# Patient Record
Sex: FEMALE | Race: BLACK OR AFRICAN AMERICAN | URBAN - METROPOLITAN AREA
[De-identification: names, ages, dates, MRNs, and addresses within clinical notes are randomized per-mention and may not be internally consistent; named-entity substitution may affect disease eponyms.]

---

## 2017-01-01 ENCOUNTER — NURSING HOME VISIT (OUTPATIENT)
Dept: GERIATRICS | Facility: CLINIC | Age: 82
End: 2017-01-01
Payer: COMMERCIAL

## 2017-01-01 ENCOUNTER — APPOINTMENT (OUTPATIENT)
Dept: PHYSICAL THERAPY | Facility: CLINIC | Age: 82
DRG: 065 | End: 2017-01-01
Payer: MEDICARE

## 2017-01-01 ENCOUNTER — APPOINTMENT (OUTPATIENT)
Dept: OCCUPATIONAL THERAPY | Facility: CLINIC | Age: 82
DRG: 065 | End: 2017-01-01
Payer: MEDICARE

## 2017-01-01 ENCOUNTER — TRANSFERRED RECORDS (OUTPATIENT)
Dept: HEALTH INFORMATION MANAGEMENT | Facility: CLINIC | Age: 82
End: 2017-01-01

## 2017-01-01 ENCOUNTER — APPOINTMENT (OUTPATIENT)
Dept: GENERAL RADIOLOGY | Facility: CLINIC | Age: 82
DRG: 065 | End: 2017-01-01
Attending: INTERNAL MEDICINE
Payer: MEDICARE

## 2017-01-01 ENCOUNTER — TELEPHONE (OUTPATIENT)
Dept: GERIATRICS | Facility: CLINIC | Age: 82
End: 2017-01-01

## 2017-01-01 ENCOUNTER — APPOINTMENT (OUTPATIENT)
Dept: SPEECH THERAPY | Facility: CLINIC | Age: 82
DRG: 065 | End: 2017-01-01
Payer: MEDICARE

## 2017-01-01 ENCOUNTER — HOSPITAL ENCOUNTER (INPATIENT)
Facility: CLINIC | Age: 82
End: 2017-01-01
Attending: PHYSICAL MEDICINE & REHABILITATION | Admitting: PHYSICAL MEDICINE & REHABILITATION
Payer: MEDICARE

## 2017-01-01 ENCOUNTER — TELEPHONE (OUTPATIENT)
Dept: INTERNAL MEDICINE | Facility: CLINIC | Age: 82
End: 2017-01-01

## 2017-01-01 ENCOUNTER — APPOINTMENT (OUTPATIENT)
Dept: CT IMAGING | Facility: CLINIC | Age: 82
DRG: 065 | End: 2017-01-01
Attending: EMERGENCY MEDICINE
Payer: MEDICARE

## 2017-01-01 ENCOUNTER — APPOINTMENT (OUTPATIENT)
Dept: SPEECH THERAPY | Facility: CLINIC | Age: 82
DRG: 065 | End: 2017-01-01
Attending: INTERNAL MEDICINE
Payer: MEDICARE

## 2017-01-01 ENCOUNTER — DISCHARGE SUMMARY NURSING HOME (OUTPATIENT)
Dept: GERIATRICS | Facility: CLINIC | Age: 82
End: 2017-01-01
Payer: COMMERCIAL

## 2017-01-01 ENCOUNTER — APPOINTMENT (OUTPATIENT)
Dept: CT IMAGING | Facility: CLINIC | Age: 82
DRG: 065 | End: 2017-01-01
Attending: NURSE PRACTITIONER
Payer: MEDICARE

## 2017-01-01 ENCOUNTER — DOCUMENTATION ONLY (OUTPATIENT)
Dept: OTHER | Facility: CLINIC | Age: 82
End: 2017-01-01

## 2017-01-01 ENCOUNTER — APPOINTMENT (OUTPATIENT)
Dept: PHYSICAL THERAPY | Facility: CLINIC | Age: 82
DRG: 065 | End: 2017-01-01
Attending: INTERNAL MEDICINE
Payer: MEDICARE

## 2017-01-01 ENCOUNTER — APPOINTMENT (OUTPATIENT)
Dept: MRI IMAGING | Facility: CLINIC | Age: 82
DRG: 065 | End: 2017-01-01
Attending: NURSE PRACTITIONER
Payer: MEDICARE

## 2017-01-01 ENCOUNTER — HOSPITAL ENCOUNTER (INPATIENT)
Facility: CLINIC | Age: 82
LOS: 8 days | Discharge: NURSING, REHAB OR LONG-TERM CARE | DRG: 065 | End: 2017-01-23
Attending: EMERGENCY MEDICINE | Admitting: INTERNAL MEDICINE
Payer: MEDICARE

## 2017-01-01 VITALS
HEART RATE: 70 BPM | WEIGHT: 131.4 LBS | TEMPERATURE: 98.7 F | RESPIRATION RATE: 18 BRPM | SYSTOLIC BLOOD PRESSURE: 151 MMHG | HEIGHT: 65 IN | BODY MASS INDEX: 21.89 KG/M2 | DIASTOLIC BLOOD PRESSURE: 84 MMHG | OXYGEN SATURATION: 92 %

## 2017-01-01 VITALS
BODY MASS INDEX: 21.43 KG/M2 | HEART RATE: 78 BPM | TEMPERATURE: 64.4 F | HEIGHT: 65 IN | WEIGHT: 128.6 LBS | OXYGEN SATURATION: 97 % | DIASTOLIC BLOOD PRESSURE: 92 MMHG | SYSTOLIC BLOOD PRESSURE: 168 MMHG | RESPIRATION RATE: 18 BRPM

## 2017-01-01 VITALS
BODY MASS INDEX: 24.24 KG/M2 | SYSTOLIC BLOOD PRESSURE: 113 MMHG | DIASTOLIC BLOOD PRESSURE: 60 MMHG | WEIGHT: 145.5 LBS | RESPIRATION RATE: 16 BRPM | HEIGHT: 65 IN | HEART RATE: 69 BPM | OXYGEN SATURATION: 98 % | TEMPERATURE: 98.1 F

## 2017-01-01 VITALS
TEMPERATURE: 98.3 F | WEIGHT: 142.6 LBS | DIASTOLIC BLOOD PRESSURE: 72 MMHG | HEART RATE: 72 BPM | OXYGEN SATURATION: 95 % | HEIGHT: 65 IN | RESPIRATION RATE: 18 BRPM | SYSTOLIC BLOOD PRESSURE: 138 MMHG | BODY MASS INDEX: 23.76 KG/M2

## 2017-01-01 VITALS
WEIGHT: 142.6 LBS | DIASTOLIC BLOOD PRESSURE: 86 MMHG | SYSTOLIC BLOOD PRESSURE: 147 MMHG | RESPIRATION RATE: 16 BRPM | OXYGEN SATURATION: 99 % | BODY MASS INDEX: 23.73 KG/M2 | HEART RATE: 68 BPM | TEMPERATURE: 96.3 F

## 2017-01-01 VITALS
RESPIRATION RATE: 18 BRPM | TEMPERATURE: 97.9 F | HEIGHT: 65 IN | BODY MASS INDEX: 22.06 KG/M2 | DIASTOLIC BLOOD PRESSURE: 64 MMHG | SYSTOLIC BLOOD PRESSURE: 119 MMHG | WEIGHT: 132.4 LBS | HEART RATE: 77 BPM | OXYGEN SATURATION: 91 %

## 2017-01-01 VITALS
OXYGEN SATURATION: 94 % | DIASTOLIC BLOOD PRESSURE: 55 MMHG | BODY MASS INDEX: 22.01 KG/M2 | RESPIRATION RATE: 18 BRPM | TEMPERATURE: 97.2 F | SYSTOLIC BLOOD PRESSURE: 93 MMHG | WEIGHT: 132.1 LBS | HEIGHT: 65 IN | HEART RATE: 74 BPM

## 2017-01-01 VITALS
TEMPERATURE: 64.4 F | HEART RATE: 78 BPM | WEIGHT: 129.3 LBS | SYSTOLIC BLOOD PRESSURE: 160 MMHG | RESPIRATION RATE: 18 BRPM | HEIGHT: 65 IN | BODY MASS INDEX: 21.54 KG/M2 | DIASTOLIC BLOOD PRESSURE: 79 MMHG | OXYGEN SATURATION: 95 %

## 2017-01-01 VITALS
TEMPERATURE: 98.7 F | BODY MASS INDEX: 23.63 KG/M2 | SYSTOLIC BLOOD PRESSURE: 127 MMHG | HEART RATE: 88 BPM | OXYGEN SATURATION: 96 % | WEIGHT: 141.8 LBS | DIASTOLIC BLOOD PRESSURE: 66 MMHG | RESPIRATION RATE: 18 BRPM | HEIGHT: 65 IN

## 2017-01-01 DIAGNOSIS — N18.30 CKD (CHRONIC KIDNEY DISEASE) STAGE 3, GFR 30-59 ML/MIN (H): ICD-10-CM

## 2017-01-01 DIAGNOSIS — D64.9 ANEMIA, UNSPECIFIED TYPE: ICD-10-CM

## 2017-01-01 DIAGNOSIS — I10 BENIGN ESSENTIAL HYPERTENSION: ICD-10-CM

## 2017-01-01 DIAGNOSIS — D62 ANEMIA DUE TO BLOOD LOSS, ACUTE: ICD-10-CM

## 2017-01-01 DIAGNOSIS — I61.0 NONTRAUMATIC SUBCORTICAL HEMORRHAGE OF LEFT CEREBRAL HEMISPHERE (H): Primary | ICD-10-CM

## 2017-01-01 DIAGNOSIS — R45.1 RESTLESSNESS AND AGITATION: ICD-10-CM

## 2017-01-01 DIAGNOSIS — I95.9 HYPOTENSION, UNSPECIFIED HYPOTENSION TYPE: ICD-10-CM

## 2017-01-01 DIAGNOSIS — S72.142A INTERTROCHANTERIC FRACTURE OF LEFT HIP, CLOSED, INITIAL ENCOUNTER (H): ICD-10-CM

## 2017-01-01 DIAGNOSIS — K59.00 CONSTIPATION, UNSPECIFIED CONSTIPATION TYPE: ICD-10-CM

## 2017-01-01 DIAGNOSIS — D69.6 THROMBOCYTOPENIA (H): ICD-10-CM

## 2017-01-01 DIAGNOSIS — S72.142D INTERTROCHANTERIC FRACTURE OF LEFT HIP, CLOSED, WITH ROUTINE HEALING, SUBSEQUENT ENCOUNTER: ICD-10-CM

## 2017-01-01 DIAGNOSIS — R53.81 PHYSICAL DECONDITIONING: ICD-10-CM

## 2017-01-01 DIAGNOSIS — Z71.89 ACP (ADVANCE CARE PLANNING): Primary | Chronic | ICD-10-CM

## 2017-01-01 DIAGNOSIS — S72.142A INTERTROCHANTERIC FRACTURE OF LEFT HIP, CLOSED, INITIAL ENCOUNTER (H): Primary | ICD-10-CM

## 2017-01-01 DIAGNOSIS — R29.898 MUSCLE RIGIDITY: ICD-10-CM

## 2017-01-01 DIAGNOSIS — I10 ESSENTIAL HYPERTENSION, MALIGNANT: ICD-10-CM

## 2017-01-01 DIAGNOSIS — I10 BENIGN ESSENTIAL HYPERTENSION: Primary | ICD-10-CM

## 2017-01-01 DIAGNOSIS — Z71.89 ADVANCED DIRECTIVES, COUNSELING/DISCUSSION: ICD-10-CM

## 2017-01-01 DIAGNOSIS — I69.351 HEMIPARESIS AFFECTING RIGHT SIDE AS LATE EFFECT OF STROKE (H): Primary | ICD-10-CM

## 2017-01-01 DIAGNOSIS — I10 MALIGNANT HYPERTENSION: ICD-10-CM

## 2017-01-01 DIAGNOSIS — R63.4 LOSS OF WEIGHT: ICD-10-CM

## 2017-01-01 DIAGNOSIS — I61.9 THALAMIC HEMORRHAGE WITH STROKE (H): ICD-10-CM

## 2017-01-01 DIAGNOSIS — R09.02 HYPOXIA: ICD-10-CM

## 2017-01-01 DIAGNOSIS — D61.818 PANCYTOPENIA (H): ICD-10-CM

## 2017-01-01 DIAGNOSIS — I61.4 NONTRAUMATIC INTRACEREBRAL HEMORRHAGE OF CEREBELLUM, UNSPECIFIED LATERALITY (H): Primary | ICD-10-CM

## 2017-01-01 LAB
ALBUMIN UR-MCNC: 10 MG/DL
ALBUMIN UR-MCNC: 10 MG/DL
ANION GAP SERPL CALCULATED.3IONS-SCNC: 10 MMOL/L (ref 3–14)
ANION GAP SERPL CALCULATED.3IONS-SCNC: 11 MMOL/L (ref 3–14)
ANION GAP SERPL CALCULATED.3IONS-SCNC: 12 MMOL/L (ref 3–14)
ANION GAP SERPL CALCULATED.3IONS-SCNC: 9 MMOL/L (ref 3–14)
APPEARANCE UR: CLEAR
APPEARANCE UR: CLEAR
APTT PPP: 25 SEC (ref 22–37)
BACTERIA SPEC CULT: NO GROWTH
BACTERIA SPEC CULT: NO GROWTH
BASOPHILS # BLD AUTO: 0 10E9/L (ref 0–0.2)
BASOPHILS NFR BLD AUTO: 0.5 %
BILIRUB UR QL STRIP: NEGATIVE
BILIRUB UR QL STRIP: NEGATIVE
BUN SERPL-MCNC: 18 MG/DL (ref 7–30)
BUN SERPL-MCNC: 19 MG/DL (ref 7–30)
BUN SERPL-MCNC: 20 MG/DL (ref 7–30)
BUN SERPL-MCNC: 24 MG/DL (ref 7–30)
BUN SERPL-MCNC: 26 MG/DL (ref 7–30)
BUN SERPL-MCNC: 26 MG/DL (ref 9–26)
BUN SERPL-MCNC: 27 MG/DL (ref 9–26)
BUN SERPL-MCNC: 27 MG/DL (ref 9–26)
BUN SERPL-MCNC: 28 MG/DL (ref 7–30)
BUN SERPL-MCNC: 28 MG/DL (ref 9–26)
BUN SERPL-MCNC: 31 MG/DL (ref 7–30)
BUN SERPL-MCNC: 35 MG/DL (ref 7–30)
BUN SERPL-MCNC: 39 MG/DL (ref 9–26)
BUN SERPL-MCNC: 41 MG/DL (ref 7–30)
BUN SERPL-MCNC: 48 MG/DL (ref 9–26)
CALCIUM SERPL-MCNC: 7.8 MG/DL (ref 8.5–10.1)
CALCIUM SERPL-MCNC: 8 MG/DL (ref 8.5–10.1)
CALCIUM SERPL-MCNC: 8.2 MG/DL (ref 8.5–10.1)
CALCIUM SERPL-MCNC: 8.5 MG/DL (ref 8.4–10.2)
CALCIUM SERPL-MCNC: 8.5 MG/DL (ref 8.5–10.1)
CALCIUM SERPL-MCNC: 8.7 MG/DL (ref 8.5–10.1)
CALCIUM SERPL-MCNC: 8.8 MG/DL (ref 8.4–10.2)
CALCIUM SERPL-MCNC: 8.8 MG/DL (ref 8.5–10.1)
CALCIUM SERPL-MCNC: 8.9 MG/DL (ref 8.4–10.2)
CALCIUM SERPL-MCNC: 9 MG/DL (ref 8.5–10.1)
CALCIUM SERPL-MCNC: 9.4 MG/DL (ref 8.4–10.2)
CALCIUM SERPL-MCNC: 9.8 MG/DL (ref 8.4–10.2)
CALCIUM SERPL-MCNC: ABNORMAL MG/DL (ref 0.55–1.02)
CHLORIDE SERPL-SCNC: 105 MMOL/L (ref 94–109)
CHLORIDE SERPL-SCNC: 111 MMOL/L (ref 94–109)
CHLORIDE SERPL-SCNC: 113 MMOL/L (ref 94–109)
CHLORIDE SERPL-SCNC: 113 MMOL/L (ref 94–109)
CHLORIDE SERPL-SCNC: 115 MMOL/L (ref 94–109)
CHLORIDE SERPL-SCNC: 116 MMOL/L (ref 94–109)
CHLORIDE SERPL-SCNC: 117 MMOL/L (ref 94–109)
CHLORIDE SERPLBLD-SCNC: 109 MMOL/L (ref 98–109)
CHLORIDE SERPLBLD-SCNC: 110 MMOL/L (ref 98–109)
CHLORIDE SERPLBLD-SCNC: 110 MMOL/L (ref 98–109)
CHLORIDE SERPLBLD-SCNC: 111 MMOL/L (ref 98–109)
CHLORIDE SERPLBLD-SCNC: 114 MMOL/L (ref 98–109)
CHLORIDE SERPLBLD-SCNC: 119 MMOL/L (ref 98–109)
CO2 SERPL-SCNC: 20 MMOL/L (ref 20–32)
CO2 SERPL-SCNC: 21 MMOL/L (ref 20–32)
CO2 SERPL-SCNC: 22 MMOL/L (ref 20–32)
CO2 SERPL-SCNC: 22 MMOL/L (ref 22–31)
CO2 SERPL-SCNC: 23 MMOL/L (ref 20–32)
CO2 SERPL-SCNC: 24 MMOL/L (ref 20–32)
CO2 SERPL-SCNC: 24 MMOL/L (ref 22–31)
CO2 SERPL-SCNC: 25 MMOL/L (ref 22–31)
CO2 SERPL-SCNC: 27 MMOL/L (ref 22–31)
COLOR UR AUTO: ABNORMAL
COLOR UR AUTO: YELLOW
CREAT SERPL-MCNC: 0.81 MG/DL (ref 0.52–1.04)
CREAT SERPL-MCNC: 0.85 MG/DL (ref 0.52–1.04)
CREAT SERPL-MCNC: 0.96 MG/DL (ref 0.52–1.04)
CREAT SERPL-MCNC: 0.97 MG/DL (ref 0.52–1.04)
CREAT SERPL-MCNC: 0.99 MG/DL (ref 0.52–1.04)
CREAT SERPL-MCNC: 1.02 MG/DL (ref 0.52–1.04)
CREAT SERPL-MCNC: 1.03 MG/DL (ref 0.55–1.02)
CREAT SERPL-MCNC: 1.04 MG/DL (ref 0.52–1.04)
CREAT SERPL-MCNC: 1.06 MG/DL (ref 0.52–1.04)
CREAT SERPL-MCNC: 1.1 MG/DL (ref 0.55–1.02)
CREAT SERPL-MCNC: 1.12 MG/DL (ref 0.52–1.04)
CREAT SERPL-MCNC: 1.16 MG/DL (ref 0.55–1.02)
CREAT SERPL-MCNC: 1.22 MG/DL (ref 0–?)
CREAT SERPL-MCNC: 1.24 MG/DL (ref 0.55–1.02)
CREAT SERPL-MCNC: 1.37 MG/DL (ref 0.55–1.02)
DIFFERENTIAL METHOD BLD: ABNORMAL
EOSINOPHIL # BLD AUTO: 0.1 10E9/L (ref 0–0.7)
EOSINOPHIL NFR BLD AUTO: 1.7 %
ERYTHROCYTE [DISTWIDTH] IN BLOOD BY AUTOMATED COUNT: 16 % (ref 10–15)
ERYTHROCYTE [DISTWIDTH] IN BLOOD BY AUTOMATED COUNT: 16.1 % (ref 10–15)
ERYTHROCYTE [DISTWIDTH] IN BLOOD BY AUTOMATED COUNT: 16.3 % (ref 10–15)
ERYTHROCYTE [DISTWIDTH] IN BLOOD BY AUTOMATED COUNT: 16.4 % (ref 11–15)
ERYTHROCYTE [DISTWIDTH] IN BLOOD BY AUTOMATED COUNT: 16.5 % (ref 10–15)
ERYTHROCYTE [DISTWIDTH] IN BLOOD BY AUTOMATED COUNT: 16.6 % (ref 10–15)
ERYTHROCYTE [DISTWIDTH] IN BLOOD BY AUTOMATED COUNT: 16.7 % (ref 10–15)
ERYTHROCYTE [DISTWIDTH] IN BLOOD BY AUTOMATED COUNT: 16.9 % (ref 10–15)
ERYTHROCYTE [DISTWIDTH] IN BLOOD BY AUTOMATED COUNT: 17.7 % (ref 11–15)
GFR SERPL CREATININE-BSD FRML MDRD: 35 ML/MIN/1.73M2
GFR SERPL CREATININE-BSD FRML MDRD: 39 ML/MIN/1.73M2
GFR SERPL CREATININE-BSD FRML MDRD: 40 ML/MIN/1.73M2
GFR SERPL CREATININE-BSD FRML MDRD: 43 ML/MIN/1.73M2
GFR SERPL CREATININE-BSD FRML MDRD: 45 ML/MIN/1.73M2
GFR SERPL CREATININE-BSD FRML MDRD: 46 ML/MIN/1.7M2
GFR SERPL CREATININE-BSD FRML MDRD: 49 ML/MIN/1.73M2
GFR SERPL CREATININE-BSD FRML MDRD: 49 ML/MIN/1.7M2
GFR SERPL CREATININE-BSD FRML MDRD: 50 ML/MIN/1.7M2
GFR SERPL CREATININE-BSD FRML MDRD: 51 ML/MIN/1.7M2
GFR SERPL CREATININE-BSD FRML MDRD: 53 ML/MIN/1.7M2
GFR SERPL CREATININE-BSD FRML MDRD: 55 ML/MIN/1.7M2
GFR SERPL CREATININE-BSD FRML MDRD: 55 ML/MIN/1.7M2
GFR SERPL CREATININE-BSD FRML MDRD: 63 ML/MIN/1.7M2
GFR SERPL CREATININE-BSD FRML MDRD: 67 ML/MIN/1.7M2
GLUCOSE BLDC GLUCOMTR-MCNC: 105 MG/DL (ref 70–99)
GLUCOSE BLDC GLUCOMTR-MCNC: 107 MG/DL (ref 70–99)
GLUCOSE BLDC GLUCOMTR-MCNC: 92 MG/DL (ref 70–99)
GLUCOSE BLDC GLUCOMTR-MCNC: 95 MG/DL (ref 70–99)
GLUCOSE BLDC GLUCOMTR-MCNC: 98 MG/DL (ref 70–99)
GLUCOSE SERPL-MCNC: 104 MG/DL (ref 70–100)
GLUCOSE SERPL-MCNC: 104 MG/DL (ref 70–99)
GLUCOSE SERPL-MCNC: 104 MG/DL (ref 70–99)
GLUCOSE SERPL-MCNC: 111 MG/DL (ref 70–99)
GLUCOSE SERPL-MCNC: 112 MG/DL (ref 70–99)
GLUCOSE SERPL-MCNC: 114 MG/DL (ref 70–100)
GLUCOSE SERPL-MCNC: 120 MG/DL (ref 70–99)
GLUCOSE SERPL-MCNC: 126 MG/DL (ref 70–99)
GLUCOSE SERPL-MCNC: 135 MG/DL (ref 70–100)
GLUCOSE SERPL-MCNC: 142 MG/DL (ref 70–99)
GLUCOSE SERPL-MCNC: 163 MG/DL (ref 70–99)
GLUCOSE SERPL-MCNC: 82 MG/DL (ref 70–100)
GLUCOSE SERPL-MCNC: 85 MG/DL (ref 70–99)
GLUCOSE SERPL-MCNC: 87 MG/DL (ref 70–100)
GLUCOSE SERPL-MCNC: 97 MG/DL (ref 70–100)
GLUCOSE UR STRIP-MCNC: NEGATIVE MG/DL
GLUCOSE UR STRIP-MCNC: NEGATIVE MG/DL
HCT VFR BLD AUTO: 28.1 % (ref 35–47)
HCT VFR BLD AUTO: 28.1 % (ref 35–47)
HCT VFR BLD AUTO: 28.8 % (ref 35–47)
HCT VFR BLD AUTO: 28.9 % (ref 35–47)
HCT VFR BLD AUTO: 29.5 % (ref 35–47)
HCT VFR BLD AUTO: 30.3 % (ref 35–47)
HCT VFR BLD AUTO: 31.2 % (ref 35–46)
HCT VFR BLD AUTO: 31.2 % (ref 35–47)
HCT VFR BLD AUTO: 31.9 % (ref 35–47)
HCT VFR BLD AUTO: 32.2 % (ref 35–46)
HCT VFR BLD AUTO: 32.6 % (ref 35–47)
HEMOGLOBIN: 8.9 G/DL (ref 11.8–15.5)
HEMOGLOBIN: 9.4 G/DL (ref 11.8–15.5)
HEMOGLOBIN: 9.5 G/DL (ref 11.8–15.5)
HEMOGLOBIN: 9.6 G/DL (ref 11.8–15.5)
HGB BLD-MCNC: 10.2 G/DL (ref 11.7–15.7)
HGB BLD-MCNC: 10.4 G/DL (ref 11.7–15.7)
HGB BLD-MCNC: 9.2 G/DL (ref 11.7–15.7)
HGB BLD-MCNC: 9.2 G/DL (ref 11.7–15.7)
HGB BLD-MCNC: 9.3 G/DL (ref 11.7–15.7)
HGB BLD-MCNC: 9.4 G/DL (ref 11.7–15.7)
HGB BLD-MCNC: 9.6 G/DL (ref 11.7–15.7)
HGB BLD-MCNC: 9.8 G/DL (ref 11.7–15.7)
HGB BLD-MCNC: 9.9 G/DL (ref 11.7–15.7)
HGB UR QL STRIP: ABNORMAL
HGB UR QL STRIP: NEGATIVE
IMM GRANULOCYTES # BLD: 0 10E9/L (ref 0–0.4)
IMM GRANULOCYTES NFR BLD: 0.7 %
INR PPP: 1.06 (ref 0.86–1.14)
INTERPRETATION ECG - MUSE: NORMAL
KETONES UR STRIP-MCNC: 5 MG/DL
KETONES UR STRIP-MCNC: NEGATIVE MG/DL
LEUKOCYTE ESTERASE UR QL STRIP: ABNORMAL
LEUKOCYTE ESTERASE UR QL STRIP: NEGATIVE
LYMPHOCYTES # BLD AUTO: 1.2 10E9/L (ref 0.8–5.3)
LYMPHOCYTES NFR BLD AUTO: 28.8 %
Lab: NORMAL
MCH RBC QN AUTO: 30.8 PG (ref 26.5–33)
MCH RBC QN AUTO: 30.9 PG (ref 26.5–33)
MCH RBC QN AUTO: 31.1 PG (ref 26.5–33)
MCH RBC QN AUTO: 31.3 PG (ref 26.5–33)
MCH RBC QN AUTO: 31.5 PG (ref 26.5–33)
MCH RBC QN AUTO: 31.6 PG (ref 26.5–33)
MCH RBC QN AUTO: 31.6 PG (ref 26.5–33)
MCH RBC QN AUTO: 31.7 PG (ref 26.5–33)
MCH RBC QN AUTO: 31.7 PG (ref 26.5–33)
MCHC RBC AUTO-ENTMCNC: 31.7 G/DL (ref 31.5–36.5)
MCHC RBC AUTO-ENTMCNC: 31.9 G/DL (ref 31.5–36.5)
MCHC RBC AUTO-ENTMCNC: 32 G/DL (ref 31.5–36.5)
MCHC RBC AUTO-ENTMCNC: 32.3 G/DL (ref 31.5–36.5)
MCHC RBC AUTO-ENTMCNC: 32.3 G/DL (ref 31.5–36.5)
MCHC RBC AUTO-ENTMCNC: 32.5 G/DL (ref 31.5–36.5)
MCHC RBC AUTO-ENTMCNC: 32.5 G/DL (ref 31.5–36.5)
MCHC RBC AUTO-ENTMCNC: 32.7 G/DL (ref 31.5–36.5)
MCHC RBC AUTO-ENTMCNC: 32.7 G/DL (ref 31.5–36.5)
MCV RBC AUTO: 100.6 FL (ref 80–100)
MCV RBC AUTO: 103.2 FL (ref 80–100)
MCV RBC AUTO: 97 FL (ref 78–100)
MCV RBC AUTO: 98 FL (ref 78–100)
MICRO REPORT STATUS: NORMAL
MICRO REPORT STATUS: NORMAL
MONOCYTES # BLD AUTO: 0.8 10E9/L (ref 0–1.3)
MONOCYTES NFR BLD AUTO: 19 %
NEUTROPHILS # BLD AUTO: 2.1 10E9/L (ref 1.6–8.3)
NEUTROPHILS NFR BLD AUTO: 49.3 %
NITRATE UR QL: NEGATIVE
NITRATE UR QL: NEGATIVE
NRBC # BLD AUTO: 0 10*3/UL
NRBC BLD AUTO-RTO: 0 /100
PH UR STRIP: 5.5 PH (ref 5–7)
PH UR STRIP: 6.5 PH (ref 5–7)
PLATELET # BLD AUTO: 125 10E9/L (ref 150–450)
PLATELET # BLD AUTO: 135 K/CMM (ref 140–450)
PLATELET # BLD AUTO: 140 10E9/L (ref 150–450)
PLATELET # BLD AUTO: 143 10E9/L (ref 150–450)
PLATELET # BLD AUTO: 176 10E9/L (ref 150–450)
PLATELET # BLD AUTO: 192 K/CMM (ref 140–450)
PLATELET # BLD AUTO: 204 10E9/L (ref 150–450)
PLATELET # BLD AUTO: 207 10E9/L (ref 150–450)
PLATELET # BLD AUTO: 209 10E9/L (ref 150–450)
PLATELET # BLD AUTO: 225 10E9/L (ref 150–450)
PLATELET # BLD AUTO: 230 10E9/L (ref 150–450)
POTASSIUM SERPL-SCNC: 3.4 MMOL/L (ref 3.4–5.3)
POTASSIUM SERPL-SCNC: 3.4 MMOL/L (ref 3.4–5.3)
POTASSIUM SERPL-SCNC: 3.7 MMOL/L (ref 3.5–5.2)
POTASSIUM SERPL-SCNC: 3.8 MMOL/L (ref 3.4–5.3)
POTASSIUM SERPL-SCNC: 3.9 MMOL/L (ref 3.4–5.3)
POTASSIUM SERPL-SCNC: 3.9 MMOL/L (ref 3.5–5.2)
POTASSIUM SERPL-SCNC: 4 MMOL/L (ref 3.4–5.3)
POTASSIUM SERPL-SCNC: 4 MMOL/L (ref 3.4–5.3)
POTASSIUM SERPL-SCNC: 4 MMOL/L (ref 3.5–5.2)
POTASSIUM SERPL-SCNC: 4.1 MMOL/L (ref 3.4–5.3)
POTASSIUM SERPL-SCNC: 4.2 MMOL/L (ref 3.5–5.2)
POTASSIUM SERPL-SCNC: 4.5 MMOL/L (ref 3.5–5.2)
POTASSIUM SERPL-SCNC: 5 MMOL/L (ref 3.5–5.2)
RADIOLOGIST FLAGS: ABNORMAL
RBC # BLD AUTO: 2.9 10E12/L (ref 3.8–5.2)
RBC # BLD AUTO: 2.9 10E12/L (ref 3.8–5.2)
RBC # BLD AUTO: 2.97 10E12/L (ref 3.8–5.2)
RBC # BLD AUTO: 2.98 10E12/L (ref 3.8–5.2)
RBC # BLD AUTO: 3.04 10E12/L (ref 3.8–5.2)
RBC # BLD AUTO: 3.1 10E12/L (ref 3.8–5.2)
RBC # BLD AUTO: 3.1 M/CMM (ref 3.7–5.2)
RBC # BLD AUTO: 3.12 M/CMM (ref 3.7–5.2)
RBC # BLD AUTO: 3.21 10E12/L (ref 3.8–5.2)
RBC # BLD AUTO: 3.28 10E12/L (ref 3.8–5.2)
RBC # BLD AUTO: 3.37 10E12/L (ref 3.8–5.2)
RBC #/AREA URNS AUTO: 11 /HPF (ref 0–2)
RBC #/AREA URNS AUTO: <1 /HPF (ref 0–2)
SODIUM SERPL-SCNC: 138 MMOL/L (ref 133–144)
SODIUM SERPL-SCNC: 140 MMOL/L (ref 136–145)
SODIUM SERPL-SCNC: 143 MMOL/L (ref 133–144)
SODIUM SERPL-SCNC: 143 MMOL/L (ref 136–145)
SODIUM SERPL-SCNC: 143 MMOL/L (ref 136–145)
SODIUM SERPL-SCNC: 144 MMOL/L (ref 133–144)
SODIUM SERPL-SCNC: 145 MMOL/L (ref 133–144)
SODIUM SERPL-SCNC: 146 MMOL/L (ref 133–144)
SODIUM SERPL-SCNC: 147 MMOL/L (ref 133–144)
SODIUM SERPL-SCNC: 147 MMOL/L (ref 136–145)
SODIUM SERPL-SCNC: 148 MMOL/L (ref 133–144)
SODIUM SERPL-SCNC: 148 MMOL/L (ref 136–145)
SODIUM SERPL-SCNC: 148 MMOL/L (ref 136–145)
SP GR UR STRIP: 1.02 (ref 1–1.03)
SP GR UR STRIP: 1.02 (ref 1–1.03)
SPECIMEN SOURCE: NORMAL
SPECIMEN SOURCE: NORMAL
SQUAMOUS #/AREA URNS AUTO: <1 /HPF (ref 0–1)
TROPONIN I SERPL-MCNC: NORMAL UG/L (ref 0–0.04)
URN SPEC COLLECT METH UR: ABNORMAL
URN SPEC COLLECT METH UR: ABNORMAL
UROBILINOGEN UR STRIP-MCNC: NORMAL MG/DL (ref 0–2)
UROBILINOGEN UR STRIP-MCNC: NORMAL MG/DL (ref 0–2)
WBC # BLD AUTO: 2.8 K/CMM (ref 3.8–11)
WBC # BLD AUTO: 3.2 K/CMM (ref 3.8–11)
WBC # BLD AUTO: 4.2 10E9/L (ref 4–11)
WBC # BLD AUTO: 4.4 10E9/L (ref 4–11)
WBC # BLD AUTO: 4.6 10E9/L (ref 4–11)
WBC # BLD AUTO: 5 10E9/L (ref 4–11)
WBC # BLD AUTO: 5.4 10E9/L (ref 4–11)
WBC # BLD AUTO: 6 10E9/L (ref 4–11)
WBC # BLD AUTO: 6.8 10E9/L (ref 4–11)
WBC # BLD AUTO: 6.9 10E9/L (ref 4–11)
WBC # BLD AUTO: 8 10E9/L (ref 4–11)
WBC #/AREA URNS AUTO: 4 /HPF (ref 0–2)
WBC #/AREA URNS AUTO: <1 /HPF (ref 0–2)

## 2017-01-01 PROCEDURE — 25000125 ZZHC RX 250: Performed by: INTERNAL MEDICINE

## 2017-01-01 PROCEDURE — 25000125 ZZHC RX 250: Performed by: PSYCHIATRY & NEUROLOGY

## 2017-01-01 PROCEDURE — 36415 COLL VENOUS BLD VENIPUNCTURE: CPT | Performed by: INTERNAL MEDICINE

## 2017-01-01 PROCEDURE — 40000225 ZZH STATISTIC SLP WARD VISIT: Performed by: SPEECH-LANGUAGE PATHOLOGIST

## 2017-01-01 PROCEDURE — 99232 SBSQ HOSP IP/OBS MODERATE 35: CPT | Performed by: INTERNAL MEDICINE

## 2017-01-01 PROCEDURE — 99309 SBSQ NF CARE MODERATE MDM 30: CPT | Performed by: NURSE PRACTITIONER

## 2017-01-01 PROCEDURE — 84484 ASSAY OF TROPONIN QUANT: CPT | Performed by: EMERGENCY MEDICINE

## 2017-01-01 PROCEDURE — 97112 NEUROMUSCULAR REEDUCATION: CPT | Mod: GO | Performed by: OCCUPATIONAL THERAPIST

## 2017-01-01 PROCEDURE — 97110 THERAPEUTIC EXERCISES: CPT | Mod: GP

## 2017-01-01 PROCEDURE — 25000128 H RX IP 250 OP 636: Performed by: INTERNAL MEDICINE

## 2017-01-01 PROCEDURE — 25000125 ZZHC RX 250: Performed by: EMERGENCY MEDICINE

## 2017-01-01 PROCEDURE — 92526 ORAL FUNCTION THERAPY: CPT | Mod: GN

## 2017-01-01 PROCEDURE — 25000128 H RX IP 250 OP 636: Performed by: EMERGENCY MEDICINE

## 2017-01-01 PROCEDURE — 40000193 ZZH STATISTIC PT WARD VISIT: Performed by: PHYSICAL THERAPIST

## 2017-01-01 PROCEDURE — 97535 SELF CARE MNGMENT TRAINING: CPT | Mod: GO

## 2017-01-01 PROCEDURE — 85027 COMPLETE CBC AUTOMATED: CPT | Performed by: INTERNAL MEDICINE

## 2017-01-01 PROCEDURE — 80048 BASIC METABOLIC PNL TOTAL CA: CPT | Performed by: INTERNAL MEDICINE

## 2017-01-01 PROCEDURE — 25000132 ZZH RX MED GY IP 250 OP 250 PS 637: Mod: GY | Performed by: INTERNAL MEDICINE

## 2017-01-01 PROCEDURE — 40000133 ZZH STATISTIC OT WARD VISIT

## 2017-01-01 PROCEDURE — 12000000 ZZH R&B MED SURG/OB

## 2017-01-01 PROCEDURE — 99223 1ST HOSP IP/OBS HIGH 75: CPT | Mod: AI | Performed by: INTERNAL MEDICINE

## 2017-01-01 PROCEDURE — 99310 SBSQ NF CARE HIGH MDM 45: CPT | Performed by: NURSE PRACTITIONER

## 2017-01-01 PROCEDURE — 81001 URINALYSIS AUTO W/SCOPE: CPT | Performed by: EMERGENCY MEDICINE

## 2017-01-01 PROCEDURE — A9270 NON-COVERED ITEM OR SERVICE: HCPCS | Mod: GY | Performed by: INTERNAL MEDICINE

## 2017-01-01 PROCEDURE — 97535 SELF CARE MNGMENT TRAINING: CPT | Mod: GO | Performed by: OCCUPATIONAL THERAPIST

## 2017-01-01 PROCEDURE — 92526 ORAL FUNCTION THERAPY: CPT | Mod: GN | Performed by: SPEECH-LANGUAGE PATHOLOGIST

## 2017-01-01 PROCEDURE — 00000146 ZZHCL STATISTIC GLUCOSE BY METER IP

## 2017-01-01 PROCEDURE — 99207 ZZC NO BILLABLE SERVICE THIS VISIT: CPT | Performed by: NURSE PRACTITIONER

## 2017-01-01 PROCEDURE — 99207 ZZC CDG-CORRECTLY CODED, REVIEWED AND AGREE: CPT | Performed by: INTERNAL MEDICINE

## 2017-01-01 PROCEDURE — 92610 EVALUATE SWALLOWING FUNCTION: CPT | Mod: GN | Performed by: SPEECH-LANGUAGE PATHOLOGIST

## 2017-01-01 PROCEDURE — 92611 MOTION FLUOROSCOPY/SWALLOW: CPT | Mod: GN | Performed by: SPEECH-LANGUAGE PATHOLOGIST

## 2017-01-01 PROCEDURE — 70450 CT HEAD/BRAIN W/O DYE: CPT | Mod: XS

## 2017-01-01 PROCEDURE — 40000225 ZZH STATISTIC SLP WARD VISIT

## 2017-01-01 PROCEDURE — 97530 THERAPEUTIC ACTIVITIES: CPT | Mod: GP | Performed by: PHYSICAL THERAPIST

## 2017-01-01 PROCEDURE — 99207 ZZC CDG-CORRECTLY CODED, REVIEWED AND AGREE: CPT | Performed by: NURSE PRACTITIONER

## 2017-01-01 PROCEDURE — 25500064 ZZH RX 255 OP 636: Performed by: EMERGENCY MEDICINE

## 2017-01-01 PROCEDURE — 40000133 ZZH STATISTIC OT WARD VISIT: Performed by: OCCUPATIONAL THERAPIST

## 2017-01-01 PROCEDURE — 87040 BLOOD CULTURE FOR BACTERIA: CPT | Performed by: INTERNAL MEDICINE

## 2017-01-01 PROCEDURE — 99310 SBSQ NF CARE HIGH MDM 45: CPT | Performed by: INTERNAL MEDICINE

## 2017-01-01 PROCEDURE — 20000003 ZZH R&B ICU

## 2017-01-01 PROCEDURE — 40000193 ZZH STATISTIC PT WARD VISIT

## 2017-01-01 PROCEDURE — 99291 CRITICAL CARE FIRST HOUR: CPT | Mod: 25

## 2017-01-01 PROCEDURE — 74230 X-RAY XM SWLNG FUNCJ C+: CPT

## 2017-01-01 PROCEDURE — 96365 THER/PROPH/DIAG IV INF INIT: CPT | Mod: 59

## 2017-01-01 PROCEDURE — 36415 COLL VENOUS BLD VENIPUNCTURE: CPT | Performed by: EMERGENCY MEDICINE

## 2017-01-01 PROCEDURE — 99316 NF DSCHRG MGMT 30 MIN+: CPT | Performed by: NURSE PRACTITIONER

## 2017-01-01 PROCEDURE — 70450 CT HEAD/BRAIN W/O DYE: CPT

## 2017-01-01 PROCEDURE — 71010 XR CHEST PORT 1 VW: CPT

## 2017-01-01 PROCEDURE — 97112 NEUROMUSCULAR REEDUCATION: CPT | Mod: GO

## 2017-01-01 PROCEDURE — 97112 NEUROMUSCULAR REEDUCATION: CPT | Mod: GP | Performed by: PHYSICAL THERAPIST

## 2017-01-01 PROCEDURE — 80048 BASIC METABOLIC PNL TOTAL CA: CPT | Performed by: EMERGENCY MEDICINE

## 2017-01-01 PROCEDURE — 85610 PROTHROMBIN TIME: CPT | Performed by: EMERGENCY MEDICINE

## 2017-01-01 PROCEDURE — 99292 CRITICAL CARE ADDL 30 MIN: CPT

## 2017-01-01 PROCEDURE — 81001 URINALYSIS AUTO W/SCOPE: CPT | Performed by: INTERNAL MEDICINE

## 2017-01-01 PROCEDURE — 70551 MRI BRAIN STEM W/O DYE: CPT

## 2017-01-01 PROCEDURE — 97162 PT EVAL MOD COMPLEX 30 MIN: CPT | Mod: GP | Performed by: PHYSICAL THERAPIST

## 2017-01-01 PROCEDURE — 40000141 ZZH STATISTIC PERIPHERAL IV START W/O US GUIDANCE

## 2017-01-01 PROCEDURE — 97166 OT EVAL MOD COMPLEX 45 MIN: CPT | Mod: GO | Performed by: OCCUPATIONAL THERAPIST

## 2017-01-01 PROCEDURE — 85025 COMPLETE CBC W/AUTO DIFF WBC: CPT | Performed by: EMERGENCY MEDICINE

## 2017-01-01 PROCEDURE — 70498 CT ANGIOGRAPHY NECK: CPT

## 2017-01-01 PROCEDURE — 99239 HOSP IP/OBS DSCHRG MGMT >30: CPT | Performed by: INTERNAL MEDICINE

## 2017-01-01 PROCEDURE — 85730 THROMBOPLASTIN TIME PARTIAL: CPT | Performed by: EMERGENCY MEDICINE

## 2017-01-01 PROCEDURE — 93005 ELECTROCARDIOGRAM TRACING: CPT

## 2017-01-01 RX ORDER — LABETALOL HYDROCHLORIDE 5 MG/ML
10-20 INJECTION, SOLUTION INTRAVENOUS
Status: DISCONTINUED | OUTPATIENT
Start: 2017-01-01 | End: 2017-01-01

## 2017-01-01 RX ORDER — HYDRALAZINE HYDROCHLORIDE 25 MG/1
25 TABLET, FILM COATED ORAL 2 TIMES DAILY
COMMUNITY
End: 2017-01-01

## 2017-01-01 RX ORDER — ACETAMINOPHEN 325 MG/1
650 TABLET ORAL EVERY 6 HOURS PRN
Status: DISCONTINUED | OUTPATIENT
Start: 2017-01-01 | End: 2017-01-01 | Stop reason: HOSPADM

## 2017-01-01 RX ORDER — LISINOPRIL 20 MG/1
20 TABLET ORAL DAILY
Status: DISCONTINUED | OUTPATIENT
Start: 2017-01-01 | End: 2017-01-01

## 2017-01-01 RX ORDER — IOPAMIDOL 755 MG/ML
70 INJECTION, SOLUTION INTRAVASCULAR ONCE
Status: COMPLETED | OUTPATIENT
Start: 2017-01-01 | End: 2017-01-01

## 2017-01-01 RX ORDER — DILTIAZEM HYDROCHLORIDE 30 MG/1
30 TABLET, FILM COATED ORAL EVERY 8 HOURS SCHEDULED
Status: DISCONTINUED | OUTPATIENT
Start: 2017-01-01 | End: 2017-01-01

## 2017-01-01 RX ORDER — HYDRALAZINE HYDROCHLORIDE 20 MG/ML
10 INJECTION INTRAMUSCULAR; INTRAVENOUS EVERY 4 HOURS PRN
Status: DISCONTINUED | OUTPATIENT
Start: 2017-01-01 | End: 2017-01-01

## 2017-01-01 RX ORDER — HYDRALAZINE HYDROCHLORIDE 20 MG/ML
20 INJECTION INTRAMUSCULAR; INTRAVENOUS EVERY 4 HOURS PRN
Status: DISCONTINUED | OUTPATIENT
Start: 2017-01-01 | End: 2017-01-01 | Stop reason: HOSPADM

## 2017-01-01 RX ORDER — ACETAMINOPHEN 500 MG
1000 TABLET ORAL 3 TIMES DAILY
COMMUNITY

## 2017-01-01 RX ORDER — CLONIDINE HYDROCHLORIDE 0.1 MG/1
0.1 TABLET ORAL 3 TIMES DAILY
Status: DISCONTINUED | OUTPATIENT
Start: 2017-01-01 | End: 2017-01-01

## 2017-01-01 RX ORDER — CLONIDINE HYDROCHLORIDE 0.1 MG/1
0.1 TABLET ORAL 4 TIMES DAILY PRN
Status: DISCONTINUED | OUTPATIENT
Start: 2017-01-01 | End: 2017-01-01

## 2017-01-01 RX ORDER — LISINOPRIL 40 MG/1
40 TABLET ORAL DAILY
Qty: 30 TABLET | DISCHARGE
Start: 2017-01-01 | End: 2017-01-01

## 2017-01-01 RX ORDER — LISINOPRIL 40 MG/1
40 TABLET ORAL DAILY
Qty: 30 TABLET | DISCHARGE
Start: 2017-01-01 | End: 2017-01-01 | Stop reason: DRUGHIGH

## 2017-01-01 RX ORDER — LISINOPRIL 40 MG/1
40 TABLET ORAL DAILY
Status: DISCONTINUED | OUTPATIENT
Start: 2017-01-01 | End: 2017-01-01 | Stop reason: HOSPADM

## 2017-01-01 RX ORDER — LISINOPRIL 10 MG/1
10 TABLET ORAL ONCE
Status: COMPLETED | OUTPATIENT
Start: 2017-01-01 | End: 2017-01-01

## 2017-01-01 RX ORDER — BACLOFEN 10 MG/1
5 TABLET ORAL 3 TIMES DAILY
COMMUNITY

## 2017-01-01 RX ORDER — AMOXICILLIN 250 MG
1 CAPSULE ORAL 2 TIMES DAILY
COMMUNITY

## 2017-01-01 RX ORDER — POLYETHYLENE GLYCOL 3350 17 G/17G
17 POWDER, FOR SOLUTION ORAL DAILY
COMMUNITY
End: 2017-01-01 | Stop reason: DRUGHIGH

## 2017-01-01 RX ORDER — GABAPENTIN 300 MG/1
300 CAPSULE ORAL AT BEDTIME
Status: DISCONTINUED | OUTPATIENT
Start: 2017-01-01 | End: 2017-01-01 | Stop reason: HOSPADM

## 2017-01-01 RX ORDER — HYDRALAZINE HYDROCHLORIDE 100 MG/1
100 TABLET, FILM COATED ORAL 3 TIMES DAILY
Qty: 60 TABLET | DISCHARGE
Start: 2017-01-01 | End: 2017-01-01

## 2017-01-01 RX ORDER — METHYLPREDNISOLONE SODIUM SUCCINATE 40 MG/ML
15 INJECTION, POWDER, LYOPHILIZED, FOR SOLUTION INTRAMUSCULAR; INTRAVENOUS EVERY 24 HOURS
Status: DISCONTINUED | OUTPATIENT
Start: 2017-01-01 | End: 2017-01-01 | Stop reason: HOSPADM

## 2017-01-01 RX ORDER — LISINOPRIL 5 MG/1
5 TABLET ORAL ONCE
Status: COMPLETED | OUTPATIENT
Start: 2017-01-01 | End: 2017-01-01

## 2017-01-01 RX ORDER — BARIUM SULFATE 400 MG/ML
SUSPENSION ORAL ONCE
Status: COMPLETED | OUTPATIENT
Start: 2017-01-01 | End: 2017-01-01

## 2017-01-01 RX ORDER — HYDRALAZINE HYDROCHLORIDE 50 MG/1
100 TABLET, FILM COATED ORAL 4 TIMES DAILY
Status: DISCONTINUED | OUTPATIENT
Start: 2017-01-01 | End: 2017-01-01 | Stop reason: HOSPADM

## 2017-01-01 RX ORDER — HYDROMORPHONE HYDROCHLORIDE 1 MG/ML
.3-.5 INJECTION, SOLUTION INTRAMUSCULAR; INTRAVENOUS; SUBCUTANEOUS
Status: DISCONTINUED | OUTPATIENT
Start: 2017-01-01 | End: 2017-01-01 | Stop reason: HOSPADM

## 2017-01-01 RX ORDER — ACETAMINOPHEN 325 MG/1
650 TABLET ORAL 3 TIMES DAILY
COMMUNITY
End: 2017-01-01 | Stop reason: DRUGHIGH

## 2017-01-01 RX ORDER — ONDANSETRON 2 MG/ML
4 INJECTION INTRAMUSCULAR; INTRAVENOUS EVERY 6 HOURS PRN
Status: DISCONTINUED | OUTPATIENT
Start: 2017-01-01 | End: 2017-01-01 | Stop reason: HOSPADM

## 2017-01-01 RX ORDER — BISACODYL 10 MG
10 SUPPOSITORY, RECTAL RECTAL
COMMUNITY

## 2017-01-01 RX ORDER — HYDRALAZINE HYDROCHLORIDE 50 MG/1
50 TABLET, FILM COATED ORAL 3 TIMES DAILY
COMMUNITY
End: 2017-01-01 | Stop reason: DRUGHIGH

## 2017-01-01 RX ORDER — DILTIAZEM HYDROCHLORIDE 30 MG/1
60 TABLET, FILM COATED ORAL EVERY 8 HOURS SCHEDULED
Status: DISCONTINUED | OUTPATIENT
Start: 2017-01-01 | End: 2017-01-01

## 2017-01-01 RX ORDER — SENNOSIDES 8.6 MG
1 TABLET ORAL 2 TIMES DAILY
Status: ON HOLD | COMMUNITY
End: 2017-01-01

## 2017-01-01 RX ORDER — HYDRALAZINE HYDROCHLORIDE 25 MG/1
50 TABLET, FILM COATED ORAL 4 TIMES DAILY
Status: DISCONTINUED | OUTPATIENT
Start: 2017-01-01 | End: 2017-01-01

## 2017-01-01 RX ORDER — LABETALOL HYDROCHLORIDE 5 MG/ML
10 INJECTION, SOLUTION INTRAVENOUS
Status: DISCONTINUED | OUTPATIENT
Start: 2017-01-01 | End: 2017-01-01

## 2017-01-01 RX ORDER — LISINOPRIL 20 MG/1
20 TABLET ORAL ONCE
Status: DISCONTINUED | OUTPATIENT
Start: 2017-01-01 | End: 2017-01-01

## 2017-01-01 RX ORDER — SODIUM CHLORIDE 9 MG/ML
INJECTION, SOLUTION INTRAVENOUS CONTINUOUS
Status: DISCONTINUED | OUTPATIENT
Start: 2017-01-01 | End: 2017-01-01

## 2017-01-01 RX ORDER — HYDRALAZINE HYDROCHLORIDE 100 MG/1
100 TABLET, FILM COATED ORAL 3 TIMES DAILY
Qty: 60 TABLET | DISCHARGE
Start: 2017-01-01 | End: 2017-01-01 | Stop reason: DRUGHIGH

## 2017-01-01 RX ORDER — HYDRALAZINE HYDROCHLORIDE 25 MG/1
25 TABLET, FILM COATED ORAL 4 TIMES DAILY
Status: DISCONTINUED | OUTPATIENT
Start: 2017-01-01 | End: 2017-01-01

## 2017-01-01 RX ORDER — POLYETHYLENE GLYCOL 3350 17 G/17G
17 POWDER, FOR SOLUTION ORAL DAILY PRN
COMMUNITY

## 2017-01-01 RX ORDER — NALOXONE HYDROCHLORIDE 0.4 MG/ML
.1-.4 INJECTION, SOLUTION INTRAMUSCULAR; INTRAVENOUS; SUBCUTANEOUS
Status: DISCONTINUED | OUTPATIENT
Start: 2017-01-01 | End: 2017-01-01 | Stop reason: HOSPADM

## 2017-01-01 RX ORDER — METOPROLOL TARTRATE 25 MG/1
25 TABLET, FILM COATED ORAL 2 TIMES DAILY
Status: DISCONTINUED | OUTPATIENT
Start: 2017-01-01 | End: 2017-01-01

## 2017-01-01 RX ORDER — LISINOPRIL 5 MG/1
5 TABLET ORAL DAILY
Status: DISCONTINUED | OUTPATIENT
Start: 2017-01-01 | End: 2017-01-01

## 2017-01-01 RX ORDER — LISINOPRIL 10 MG/1
10 TABLET ORAL DAILY
Status: DISCONTINUED | OUTPATIENT
Start: 2017-01-01 | End: 2017-01-01

## 2017-01-01 RX ORDER — CLONIDINE HYDROCHLORIDE 0.1 MG/1
.1-.2 TABLET ORAL 4 TIMES DAILY PRN
Status: DISCONTINUED | OUTPATIENT
Start: 2017-01-01 | End: 2017-01-01 | Stop reason: HOSPADM

## 2017-01-01 RX ORDER — CLONIDINE HYDROCHLORIDE 0.1 MG/1
0.1 TABLET ORAL EVERY 4 HOURS PRN
Status: DISCONTINUED | OUTPATIENT
Start: 2017-01-01 | End: 2017-01-01

## 2017-01-01 RX ADMIN — HYDRALAZINE HYDROCHLORIDE 20 MG: 20 INJECTION INTRAMUSCULAR; INTRAVENOUS at 18:54

## 2017-01-01 RX ADMIN — SODIUM CHLORIDE: 9 INJECTION, SOLUTION INTRAVENOUS at 03:11

## 2017-01-01 RX ADMIN — METHYLPREDNISOLONE SODIUM SUCCINATE 15 MG: 40 INJECTION, POWDER, FOR SOLUTION INTRAMUSCULAR; INTRAVENOUS at 14:28

## 2017-01-01 RX ADMIN — HYDRALAZINE HYDROCHLORIDE 100 MG: 50 TABLET ORAL at 08:27

## 2017-01-01 RX ADMIN — LABETALOL HYDROCHLORIDE 20 MG: 5 INJECTION, SOLUTION INTRAVENOUS at 13:34

## 2017-01-01 RX ADMIN — METHYLPREDNISOLONE SODIUM SUCCINATE 15 MG: 40 INJECTION, POWDER, FOR SOLUTION INTRAMUSCULAR; INTRAVENOUS at 13:41

## 2017-01-01 RX ADMIN — METOPROLOL TARTRATE 25 MG: 25 TABLET, FILM COATED ORAL at 20:30

## 2017-01-01 RX ADMIN — HYDRALAZINE HYDROCHLORIDE 100 MG: 50 TABLET ORAL at 17:59

## 2017-01-01 RX ADMIN — HYDRALAZINE HYDROCHLORIDE 50 MG: 25 TABLET ORAL at 17:37

## 2017-01-01 RX ADMIN — HYDRALAZINE HYDROCHLORIDE 20 MG: 20 INJECTION INTRAMUSCULAR; INTRAVENOUS at 22:57

## 2017-01-01 RX ADMIN — METHYLPREDNISOLONE SODIUM SUCCINATE 15 MG: 40 INJECTION, POWDER, FOR SOLUTION INTRAMUSCULAR; INTRAVENOUS at 13:24

## 2017-01-01 RX ADMIN — LABETALOL HYDROCHLORIDE 10 MG: 5 INJECTION, SOLUTION INTRAVENOUS at 18:48

## 2017-01-01 RX ADMIN — HYDRALAZINE HYDROCHLORIDE 25 MG: 25 TABLET ORAL at 09:00

## 2017-01-01 RX ADMIN — LABETALOL HYDROCHLORIDE 20 MG: 5 INJECTION, SOLUTION INTRAVENOUS at 12:18

## 2017-01-01 RX ADMIN — SODIUM CHLORIDE 100 ML: 9 INJECTION, SOLUTION INTRAVENOUS at 07:38

## 2017-01-01 RX ADMIN — HYDRALAZINE HYDROCHLORIDE 25 MG: 25 TABLET ORAL at 17:35

## 2017-01-01 RX ADMIN — SODIUM CHLORIDE: 9 INJECTION, SOLUTION INTRAVENOUS at 16:52

## 2017-01-01 RX ADMIN — Medication 7.5 MG/HR: at 10:03

## 2017-01-01 RX ADMIN — Medication 10 MG/HR: at 06:32

## 2017-01-01 RX ADMIN — SODIUM CHLORIDE: 9 INJECTION, SOLUTION INTRAVENOUS at 13:42

## 2017-01-01 RX ADMIN — METHYLPREDNISOLONE SODIUM SUCCINATE 15 MG: 40 INJECTION, POWDER, FOR SOLUTION INTRAMUSCULAR; INTRAVENOUS at 12:31

## 2017-01-01 RX ADMIN — HYDRALAZINE HYDROCHLORIDE 20 MG: 20 INJECTION INTRAMUSCULAR; INTRAVENOUS at 04:15

## 2017-01-01 RX ADMIN — CLONIDINE HYDROCHLORIDE 0.1 MG: 0.1 TABLET ORAL at 11:58

## 2017-01-01 RX ADMIN — LABETALOL HYDROCHLORIDE 10 MG: 5 INJECTION, SOLUTION INTRAVENOUS at 15:11

## 2017-01-01 RX ADMIN — LISINOPRIL 5 MG: 5 TABLET ORAL at 17:38

## 2017-01-01 RX ADMIN — LISINOPRIL 5 MG: 5 TABLET ORAL at 10:50

## 2017-01-01 RX ADMIN — LISINOPRIL 20 MG: 20 TABLET ORAL at 08:19

## 2017-01-01 RX ADMIN — Medication 5 MG/HR: at 06:48

## 2017-01-01 RX ADMIN — LABETALOL HYDROCHLORIDE 20 MG: 5 INJECTION, SOLUTION INTRAVENOUS at 23:47

## 2017-01-01 RX ADMIN — NICARDIPINE HYDROCHLORIDE 2.5 MG/HR: 0.2 INJECTION, SOLUTION INTRAVENOUS at 08:13

## 2017-01-01 RX ADMIN — METOPROLOL TARTRATE 25 MG: 25 TABLET, FILM COATED ORAL at 11:09

## 2017-01-01 RX ADMIN — HYDRALAZINE HYDROCHLORIDE 100 MG: 50 TABLET ORAL at 22:08

## 2017-01-01 RX ADMIN — LISINOPRIL 10 MG: 10 TABLET ORAL at 11:11

## 2017-01-01 RX ADMIN — LABETALOL HYDROCHLORIDE 10 MG: 5 INJECTION, SOLUTION INTRAVENOUS at 17:41

## 2017-01-01 RX ADMIN — METHYLPREDNISOLONE SODIUM SUCCINATE 15 MG: 40 INJECTION, POWDER, FOR SOLUTION INTRAMUSCULAR; INTRAVENOUS at 13:38

## 2017-01-01 RX ADMIN — HYDRALAZINE HYDROCHLORIDE 10 MG: 20 INJECTION INTRAMUSCULAR; INTRAVENOUS at 02:03

## 2017-01-01 RX ADMIN — HYDRALAZINE HYDROCHLORIDE 100 MG: 50 TABLET ORAL at 20:35

## 2017-01-01 RX ADMIN — HYDRALAZINE HYDROCHLORIDE 20 MG: 20 INJECTION INTRAMUSCULAR; INTRAVENOUS at 13:57

## 2017-01-01 RX ADMIN — Medication 10 MG/HR: at 15:58

## 2017-01-01 RX ADMIN — HYDRALAZINE HYDROCHLORIDE 20 MG: 20 INJECTION INTRAMUSCULAR; INTRAVENOUS at 06:59

## 2017-01-01 RX ADMIN — GABAPENTIN 300 MG: 300 CAPSULE ORAL at 19:50

## 2017-01-01 RX ADMIN — SODIUM CHLORIDE: 9 INJECTION, SOLUTION INTRAVENOUS at 17:23

## 2017-01-01 RX ADMIN — METOPROLOL TARTRATE 25 MG: 25 TABLET, FILM COATED ORAL at 08:27

## 2017-01-01 RX ADMIN — LABETALOL HYDROCHLORIDE 10 MG: 5 INJECTION, SOLUTION INTRAVENOUS at 23:03

## 2017-01-01 RX ADMIN — HYDRALAZINE HYDROCHLORIDE 100 MG: 50 TABLET ORAL at 08:46

## 2017-01-01 RX ADMIN — LISINOPRIL 10 MG: 10 TABLET ORAL at 08:27

## 2017-01-01 RX ADMIN — DILTIAZEM HYDROCHLORIDE 30 MG: 30 TABLET, FILM COATED ORAL at 08:27

## 2017-01-01 RX ADMIN — METOPROLOL TARTRATE 25 MG: 25 TABLET, FILM COATED ORAL at 22:09

## 2017-01-01 RX ADMIN — HYDRALAZINE HYDROCHLORIDE 50 MG: 25 TABLET ORAL at 21:45

## 2017-01-01 RX ADMIN — DILTIAZEM HYDROCHLORIDE 60 MG: 30 TABLET, FILM COATED ORAL at 16:45

## 2017-01-01 RX ADMIN — DILTIAZEM HYDROCHLORIDE 60 MG: 30 TABLET, FILM COATED ORAL at 00:25

## 2017-01-01 RX ADMIN — LABETALOL HYDROCHLORIDE 20 MG: 5 INJECTION, SOLUTION INTRAVENOUS at 17:24

## 2017-01-01 RX ADMIN — LABETALOL HYDROCHLORIDE 10 MG: 5 INJECTION, SOLUTION INTRAVENOUS at 22:41

## 2017-01-01 RX ADMIN — CLONIDINE HYDROCHLORIDE 0.1 MG: 0.1 TABLET ORAL at 16:45

## 2017-01-01 RX ADMIN — LABETALOL HYDROCHLORIDE 10 MG: 5 INJECTION, SOLUTION INTRAVENOUS at 22:27

## 2017-01-01 RX ADMIN — LABETALOL HYDROCHLORIDE 20 MG: 5 INJECTION, SOLUTION INTRAVENOUS at 14:39

## 2017-01-01 RX ADMIN — LABETALOL HYDROCHLORIDE 10 MG: 5 INJECTION, SOLUTION INTRAVENOUS at 17:21

## 2017-01-01 RX ADMIN — HYDRALAZINE HYDROCHLORIDE 100 MG: 50 TABLET ORAL at 00:28

## 2017-01-01 RX ADMIN — HYDRALAZINE HYDROCHLORIDE 100 MG: 50 TABLET ORAL at 13:24

## 2017-01-01 RX ADMIN — HYDRALAZINE HYDROCHLORIDE 25 MG: 25 TABLET ORAL at 21:40

## 2017-01-01 RX ADMIN — Medication 2.5 MG/HR: at 02:13

## 2017-01-01 RX ADMIN — DILTIAZEM HYDROCHLORIDE 30 MG: 30 TABLET, FILM COATED ORAL at 00:18

## 2017-01-01 RX ADMIN — HYDRALAZINE HYDROCHLORIDE 10 MG: 20 INJECTION INTRAMUSCULAR; INTRAVENOUS at 11:20

## 2017-01-01 RX ADMIN — HYDRALAZINE HYDROCHLORIDE 25 MG: 25 TABLET ORAL at 15:32

## 2017-01-01 RX ADMIN — Medication 10 MG/HR: at 02:20

## 2017-01-01 RX ADMIN — LISINOPRIL 5 MG: 5 TABLET ORAL at 09:00

## 2017-01-01 RX ADMIN — HYDRALAZINE HYDROCHLORIDE 100 MG: 50 TABLET ORAL at 12:06

## 2017-01-01 RX ADMIN — CLONIDINE HYDROCHLORIDE 0.1 MG: 0.1 TABLET ORAL at 04:45

## 2017-01-01 RX ADMIN — LABETALOL HYDROCHLORIDE 20 MG: 5 INJECTION, SOLUTION INTRAVENOUS at 01:00

## 2017-01-01 RX ADMIN — SODIUM CHLORIDE: 9 INJECTION, SOLUTION INTRAVENOUS at 16:25

## 2017-01-01 RX ADMIN — HYDRALAZINE HYDROCHLORIDE 100 MG: 50 TABLET ORAL at 08:18

## 2017-01-01 RX ADMIN — LABETALOL HYDROCHLORIDE 20 MG: 5 INJECTION, SOLUTION INTRAVENOUS at 21:24

## 2017-01-01 RX ADMIN — DILTIAZEM HYDROCHLORIDE 60 MG: 30 TABLET, FILM COATED ORAL at 08:15

## 2017-01-01 RX ADMIN — LABETALOL HYDROCHLORIDE 20 MG: 5 INJECTION, SOLUTION INTRAVENOUS at 03:11

## 2017-01-01 RX ADMIN — HYDRALAZINE HYDROCHLORIDE 50 MG: 25 TABLET ORAL at 09:00

## 2017-01-01 RX ADMIN — HYDRALAZINE HYDROCHLORIDE 20 MG: 20 INJECTION INTRAMUSCULAR; INTRAVENOUS at 08:58

## 2017-01-01 RX ADMIN — LABETALOL HYDROCHLORIDE 10 MG: 5 INJECTION, SOLUTION INTRAVENOUS at 14:34

## 2017-01-01 RX ADMIN — METHYLPREDNISOLONE SODIUM SUCCINATE 15 MG: 40 INJECTION, POWDER, FOR SOLUTION INTRAMUSCULAR; INTRAVENOUS at 12:11

## 2017-01-01 RX ADMIN — HYDRALAZINE HYDROCHLORIDE 100 MG: 50 TABLET ORAL at 19:49

## 2017-01-01 RX ADMIN — METOPROLOL TARTRATE 25 MG: 25 TABLET, FILM COATED ORAL at 09:00

## 2017-01-01 RX ADMIN — HYDRALAZINE HYDROCHLORIDE 10 MG: 20 INJECTION INTRAMUSCULAR; INTRAVENOUS at 13:40

## 2017-01-01 RX ADMIN — HYDRALAZINE HYDROCHLORIDE 100 MG: 50 TABLET ORAL at 20:23

## 2017-01-01 RX ADMIN — HYDROMORPHONE HYDROCHLORIDE 0.5 MG: 1 INJECTION, SOLUTION INTRAMUSCULAR; INTRAVENOUS; SUBCUTANEOUS at 11:02

## 2017-01-01 RX ADMIN — HYDRALAZINE HYDROCHLORIDE 100 MG: 50 TABLET ORAL at 00:25

## 2017-01-01 RX ADMIN — METOPROLOL TARTRATE 25 MG: 25 TABLET, FILM COATED ORAL at 08:29

## 2017-01-01 RX ADMIN — LISINOPRIL 40 MG: 40 TABLET ORAL at 08:46

## 2017-01-01 RX ADMIN — Medication 12 MG/HR: at 20:00

## 2017-01-01 RX ADMIN — HYDRALAZINE HYDROCHLORIDE 100 MG: 50 TABLET ORAL at 08:28

## 2017-01-01 RX ADMIN — HYDRALAZINE HYDROCHLORIDE 20 MG: 20 INJECTION INTRAMUSCULAR; INTRAVENOUS at 03:35

## 2017-01-01 RX ADMIN — Medication 10 MG/HR: at 12:30

## 2017-01-01 RX ADMIN — GABAPENTIN 300 MG: 300 CAPSULE ORAL at 20:24

## 2017-01-01 RX ADMIN — HYDROMORPHONE HYDROCHLORIDE 0.3 MG: 1 INJECTION, SOLUTION INTRAMUSCULAR; INTRAVENOUS; SUBCUTANEOUS at 18:56

## 2017-01-01 RX ADMIN — DILTIAZEM HYDROCHLORIDE 30 MG: 30 TABLET, FILM COATED ORAL at 16:24

## 2017-01-01 RX ADMIN — GABAPENTIN 300 MG: 300 CAPSULE ORAL at 22:09

## 2017-01-01 RX ADMIN — METOPROLOL TARTRATE 25 MG: 25 TABLET, FILM COATED ORAL at 20:26

## 2017-01-01 RX ADMIN — LABETALOL HYDROCHLORIDE 20 MG: 5 INJECTION, SOLUTION INTRAVENOUS at 03:51

## 2017-01-01 RX ADMIN — ACETAMINOPHEN 650 MG: 325 TABLET, FILM COATED ORAL at 11:53

## 2017-01-01 RX ADMIN — LABETALOL HYDROCHLORIDE 10 MG: 5 INJECTION, SOLUTION INTRAVENOUS at 16:30

## 2017-01-01 RX ADMIN — METHYLPREDNISOLONE SODIUM SUCCINATE 15 MG: 40 INJECTION, POWDER, FOR SOLUTION INTRAMUSCULAR; INTRAVENOUS at 11:07

## 2017-01-01 RX ADMIN — Medication 12 MG/HR: at 23:19

## 2017-01-01 RX ADMIN — METOPROLOL TARTRATE 25 MG: 25 TABLET, FILM COATED ORAL at 20:35

## 2017-01-01 RX ADMIN — METHYLPREDNISOLONE SODIUM SUCCINATE 15 MG: 40 INJECTION, POWDER, FOR SOLUTION INTRAMUSCULAR; INTRAVENOUS at 12:28

## 2017-01-01 RX ADMIN — HYDRALAZINE HYDROCHLORIDE 100 MG: 50 TABLET ORAL at 17:40

## 2017-01-01 RX ADMIN — IOPAMIDOL 70 ML: 755 INJECTION, SOLUTION INTRAVENOUS at 07:38

## 2017-01-01 RX ADMIN — HYDROMORPHONE HYDROCHLORIDE 0.5 MG: 1 INJECTION, SOLUTION INTRAMUSCULAR; INTRAVENOUS; SUBCUTANEOUS at 22:06

## 2017-01-01 RX ADMIN — Medication 5 MG/HR: at 23:20

## 2017-01-01 RX ADMIN — BARIUM SULFATE 10 ML: 400 SUSPENSION ORAL at 10:49

## 2017-01-01 RX ADMIN — GABAPENTIN 300 MG: 300 CAPSULE ORAL at 20:35

## 2017-01-01 RX ADMIN — CLONIDINE HYDROCHLORIDE 0.1 MG: 0.1 TABLET ORAL at 05:04

## 2017-01-01 RX ADMIN — SODIUM CHLORIDE 500 ML: 9 INJECTION, SOLUTION INTRAVENOUS at 08:12

## 2017-01-01 RX ADMIN — LABETALOL HYDROCHLORIDE 20 MG: 5 INJECTION, SOLUTION INTRAVENOUS at 15:33

## 2017-01-01 RX ADMIN — ACETAMINOPHEN 650 MG: 325 TABLET, FILM COATED ORAL at 00:25

## 2017-01-01 RX ADMIN — LISINOPRIL 10 MG: 10 TABLET ORAL at 08:28

## 2017-01-01 RX ADMIN — SODIUM CHLORIDE: 9 INJECTION, SOLUTION INTRAVENOUS at 04:40

## 2017-01-01 RX ADMIN — SODIUM CHLORIDE: 9 INJECTION, SOLUTION INTRAVENOUS at 10:03

## 2017-01-01 RX ADMIN — HYDRALAZINE HYDROCHLORIDE 100 MG: 50 TABLET ORAL at 16:44

## 2017-01-01 RX ADMIN — HYDRALAZINE HYDROCHLORIDE 50 MG: 25 TABLET ORAL at 12:28

## 2017-01-01 ASSESSMENT — VISUAL ACUITY
OU: BLINKS TO THREAT

## 2017-01-01 ASSESSMENT — ENCOUNTER SYMPTOMS
SPEECH DIFFICULTY: 1
FACIAL ASYMMETRY: 1
WEAKNESS: 1
CONFUSION: 1

## 2017-01-12 NOTE — PROGRESS NOTES
[facility] DISCHARGE SUMMARY    PATIENT'S NAME: Michelle Lucio  YOB: 1931  MEDICAL RECORD NUMBER:  [id number]    PRIMARY CARE PROVIDER AND CLINIC RESPONSIBLE AFTER TRANSFER: [facility] [place] [facility] [address] / [place] [facility]     CODE STATUS/ADVANCE DIRECTIVES DISCUSSION:   CPR/Full code        Allergies   Allergen Reactions     Colchicine      Diarrhea       Hydroxychloroquine      Pilocarpine      Sweating, chills.     Probenecid      Diarrhea       Tizanidine      Increased dizzines but prescribed for Pain management       TRANSFERRING PROVIDERS: Tonya Lynn Haase, APRN CNP, Dr. Lamonte MD  DATE OF SNF ADMISSION:  December / 20 / 2016  DATE OF SNF (anticipated) DISCHARGE: January / 15 / 2017  DISCHARGE DISPOSITION: [facility] Provider   Nursing Facility: [facility] stay 12/15/16 to 12/20/16.     Condition on Discharge:  Stable.  Function:  wakling indep using RW up to 150 feet, indep with ADL's  Cognitive Scores: CPT 4.9/5.6    Equipment: walker    DISCHARGE DIAGNOSIS:   1. Intertrochanteric fracture of left hip, closed, initial encounter (H)    2. Anemia due to blood loss, acute    3. Thrombocytopenia (H)    4. Benign essential hypertension    5. CKD (chronic kidney disease) stage 3, GFR 30-59 ml/min    6. Physical deconditioning        [facility] Course:  Patient progressed in therapy to walking indep with a RW up to 150 feet, indep with ADL's, cognitive testing was done and patient scored 4.9/5.6 on CPT testing indicating the need for daily checks, patient lives with her partner who helps care for her. Patient will DC home with home PT and OT.       ASSESSMENT/PLAN:  Intertrochanteric fracture of left hip, closed, initial encounter (H)  Physical deconditioning  Acute s/p ORIF Dr. Linn: DC home with home PT and RN, tylenol 650mg will schedule TID,continue tramadol 50mg q 6 hours prn, ASA 81mg QD    Anemia  due to blood loss, acute  Thrombocytopenia (H)  Acute: CBC stable, f/u with PCP, continue vitamin B12 500mg QD, folic acid 1mg QD    Benign essential hypertension  CKD (chronic kidney disease) stage 3, GFR 30-59 ml/min  Chronic: continue metoprolol 25mg QD            PAST MEDICAL HISTORY:  has a past medical history of Neuropathy (H); Sjoegren syndrome (H); Arthritis; Spondylitis (H); Hypertension; High cholesterol; Numbness and tingling; Vertigo; Spinal stenosis; TIA (transient ischemic attack); Shortness of breath; HLD (hyperlipidemia); Renal disease; and History of blood transfusion.    DISCHARGE MEDICATIONS:  Current Outpatient Prescriptions   Medication Sig Dispense Refill     polyethylene glycol (MIRALAX/GLYCOLAX) powder Take 17 g by mouth daily       ACETAMINOPHEN PO Take 650 mg by mouth 3 times daily AND Give 2 tablet by mouth every 6 hours as needed for Pain       sennosides (SENOKOT) 8.6 MG tablet Take 1 tablet by mouth 2 times daily       traMADol-acetaminophen (ULTRACET) 37.5-325 MG per tablet Take 1 tablet by mouth every 6 hours as needed for moderate pain 40 tablet 1     folic acid (FOLVITE) 1 MG tablet Take 1 tablet (1 mg) by mouth daily 30 tablet 0     aspirin 81 MG tablet Take 1 tablet (81 mg) by mouth daily 30 tablet      CYANOCOBALAMIN SL Place 500 mcg under the tongue daily       polyethylene glycol 0.4%- propylene glycol 0.3% (SYSTANE ULTRA) 0.4-0.3 % SOLN ophthalmic solution Place 1 drop into both eyes every hour as needed for dry eyes       metoprolol (TOPROL-XL) 25 MG 24 hr tablet Take 1 tablet (25 mg) by mouth daily       simvastatin (ZOCOR) 20 MG tablet Take 1 tablet (20 mg) by mouth At Bedtime       predniSONE (DELTASONE) 5 MG tablet Take 5 mg by mouth daily        gabapentin (NEURONTIN) 300 MG capsule Take 300 mg by mouth At Bedtime        allopurinol (ZYLOPRIM) 100 MG tablet Take 1 tablet (100 mg) by mouth daily       meclizine (ANTIVERT) 25 MG tablet Take 1 tablet (25 mg) by mouth every  6 hours as needed for dizziness       raloxifene (EVISTA) 60 MG tablet Take 1 tablet (60 mg) by mouth daily       calcium carbonate (OS-ABELINO 600 MG [place]. CA) 1500 (600 CA) MG tablet Take 1 tablet (600 mg) by mouth 2 times daily       Multiple Vitamins-Minerals (CENTRUM SILVER) per tablet Take 1 tablet by mouth daily       omega 3 1000 MG CAPS Take 1 g by mouth daily         MEDICATION CHANGES/RATIONALE:   Started senna s and miralax for bowels  No other medication changes made  Last 3 BPs: 147/86, 116/65, 172/70  Admission Weight: 151.2lbs  Current  Weight: 142.6lb        Controlled medications sent with patient: Script for ultram 50mg medication for 25 tabs and 0 refills given to patient at dischage to have them fill at their out patient pharmacy     ROS:    10 point ROS of systems including Constitutional, Eyes, Respiratory, Cardiovascular, Gastroenterology, Genitourinary, Integumentary, Muscularskeletal, Psychiatric were all negative except for pertinent positives noted in my HPI.    Physical Exam:   Vitals: /86 mmHg  Pulse 68  Temp(Src) 96.3  F (35.7  C)  Resp 16  Wt 142 lb 9.6 oz (64.683 kg)  SpO2 99%  BMI= Body mass index is 23.73 kg/(m^2).    GENERAL APPEARANCE:  Alert, in no distress  ENT:  Mouth and posterior oropharynx normal, moist mucous membranes, [place]  EYES:  EOM, conjunctivae, lids, pupils and irises normal, PERRL  RESP:  respiratory effort and palpation of chest normal, lungs clear to auscultation , no respiratory distress  CV:  Palpation and auscultation of heart done , regular rate and rhythm, no murmur, rub, or gallop, no edema  ABDOMEN:  normal bowel sounds, soft, nontender, no hepatosplenomegaly or other masses  M/S:   Examination of:   right upper extremity, left upper extremity and right lower extremity  Inspection, ROM, stability and muscle strength normal and decreased ROM and strength LLE  SKIN:  Inspection of skin and subcutaneous tissue baseline  NEURO:   Cranial nerves 2-12 are  normal tested and grossly at patient's baseline, speech WNL    DISCHARGE PLAN:  Physical Therapy and Registered Nurse  Patient instructed to follow-up with:  PCP in 7 days      [facility] scheduled appointments:      Pending labs: none  [facility] labs   CBC RESULTS:   Recent Labs   Lab Test 12/22/16 12/20/16   0949  12/19/16   0610   WBC  3.0*  3.6*  4.3   RBC  2.63*  2.93*  2.10*   HGB  8.5*  9.7*  7.0*   HCT  26.1*  28.8*  21.1*   MCV  99.2  98  101*   MCH   --   33.1*  33.3*   MCHC   --   33.7  33.2   RDW  16.3*  16.6*  16.6*   PLT  164  117*  97*       Last Basic Metabolic Panel:  Recent Labs   Lab Test 12/22/16 12/19/16   0610   NA  137  143   POTASSIUM  3.5  3.9   CHLORIDE  106  112*   ABELINO  8.6  7.9*   CO2  24  22   BUN  23  23   CR  0.99  0.93   GLC  107*  106*     Discharge Treatments:none    TOTAL DISCHARGE TIME:   Greater than 30 minutes  Electronically signed by:  Tonya Lynn Haase, APRN CNP

## 2017-01-15 PROBLEM — I61.9 ICH (INTRACEREBRAL HEMORRHAGE) (H): Status: ACTIVE | Noted: 2017-01-01

## 2017-01-15 NOTE — IP AVS SNAPSHOT
MRN:[id number]                      After Visit Summary   1/15/2017    Michelle Lucio    MRN: [id number]           Thank you!     Thank you for choosing [facility] for your care. Our goal is always to provide you with excellent care. Hearing back from our patients is one way we can continue to improve our services. Please take a few minutes to complete the written survey that you may receive in the mail after you visit with us. Thank you!        Patient Information     Date Of Birth          1/27/1931        About your hospital stay     You were admitted on:  January 15, 2017 You last received care in the:  [facility] [address] [facility]    You were discharged on:  January 23, 2017        Reason for your hospital stay       CVA                  Who to Call     For medical emergencies, please call 911.  For non-urgent questions about your medical care, please call your primary care provider or clinic, [phone]          Attending Provider     Provider    Sho Lazo MD Parpia, Abdul K, MD       Primary Care Provider Office Phone # Fax #    Graciela [facility]on [phone]       [facility] [address] [facility] [address]        After Care Instructions     Activity - Up ad tanisha           Advance Diet as Tolerated       Follow this diet upon discharge: Orders Placed This Encounter  Snacks/Supplements Adult: Magic Cup; With Meals  Dysphagia Diet Level 1 Pureed Nectar Thickened Liquids (pre-thickened or use instant food thickener)            General info for SNF       Length of Stay Estimate: Short Term Care: Estimated # of Days <30  Condition at Discharge: Stable  Level of care:skilled   Rehabilitation Potential: Fair  Admission H&P remains valid and up-to-date: Yes  Recent Chemotherapy: N/A  Use Nursing Home Standing Orders: Yes            Mantoux instructions       Give two-step Mantoux (PPD) Per Facility Policy Yes                  Additional  "Services     Occupational Therapy Adult Consult       Evaluate and treat as clinically indicated.    Reason:  CVA            Physical Therapy Adult Consult       Evaluate and treat as clinically indicated.    Reason:  CVA            Speech Language Path Adult Consult       Evaluate and treat as clinically indicated.    Reason:  CVA                  Further instructions from your care team       Pt to discharge to [facility] (P: [phone]; F: [phone]) via HE stretcher.     Pending Results     No orders found from 2017 to 2017.            Statement of Approval     Ordered          17 1401  I have reviewed and agree with all the recommendations and orders detailed in this document.   EFFECTIVE NOW     Approved and electronically signed by:  Sole Velez MD             Admission Information        Department Dept Phone    1/15/2017 [facility] [phone]      Your Vitals Were     Blood Pressure Pulse Temperature    113/60 mmHg 69 98.1  F (36.7  C) (Oral)    Respirations Height Weight    16 1.651 m (5' 5\") 66 kg (145 lb 8.1 oz)    BMI (Body Mass Index) Pulse Oximetry       24.21 kg/m2 98%       MyChart Information     [employer] lets you send messages to your doctor, view your test results, renew your prescriptions, schedule appointments and more. To sign up, go to [email or web address] . Click on \"Log in\" on the left side of the screen, which will take you to the Welcome page. Then click on \"Sign up Now\" on the right side of the page.     You will be asked to enter the access code listed below, as well as some personal information. Please follow the directions to create your username and password.     Your access code is: [email or web address]  Expires: 2017  1:04 PM     Your access code will  in 90 days. If you need help or a new code, please call your [facility] clinic or [phone].        Care EveryWhere ID     This is your Care EveryWhere ID. This could be " used by other organizations to access your [facility] medical records  [id number]           Review of your medicines      START taking        Dose / Directions    hydrALAZINE 100 MG Tabs tablet   Commonly known as:  APRESOLINE   Used for:  Malignant hypertension        Dose:  100 mg   Take 1 tablet (100 mg) by mouth 3 times daily   Quantity:  60 tablet   Refills:  0       lisinopril 40 MG tablet   Commonly known as:  PRINIVIL/ZESTRIL   Used for:  Essential hypertension, malignant        Dose:  40 mg   Take 1 tablet (40 mg) by mouth daily   Quantity:  30 tablet   Refills:  0         CONTINUE these medicines which may have CHANGED, or have new prescriptions. If we are uncertain of the size of tablets/capsules you have at home, strength may be listed as something that might have changed.        Dose / Directions    TYLENOL PO   This may have changed:  Another medication with the same name was removed. Continue taking this medication, and follow the directions you see here.        Dose:  650 mg   Take 650 mg by mouth every 6 hours as needed for mild pain or fever   Refills:  0         CONTINUE these medicines which have NOT CHANGED        Dose / Directions    allopurinol 100 MG tablet   Commonly known as:  ZYLOPRIM        Dose:  100 mg   Take 1 tablet (100 mg) by mouth daily   Refills:  0       calcium carbonate 1500 (600 CA) MG tablet   Commonly known as:  OS-ABELINO 600 mg [place]. Ca        Dose:  1 tablet   Take 1 tablet (600 mg) by mouth 2 times daily   Refills:  0       CENTRUM SILVER per tablet        Dose:  1 tablet   Take 1 tablet by mouth daily   Refills:  0       CYANOCOBALAMIN SL        Dose:  500 mcg   Place 500 mcg under the tongue daily   Refills:  0       folic acid 1 MG tablet   Commonly known as:  FOLVITE   Used for:  Intertrochanteric fracture of left hip, closed, initial encounter (H)        Dose:  1 mg   Take 1 tablet (1 mg) by mouth daily   Quantity:  30 tablet   Refills:  0       gabapentin 300 MG  capsule   Commonly known as:  NEURONTIN        Dose:  300 mg   Take 300 mg by mouth At Bedtime   Refills:  0       meclizine 25 MG tablet   Commonly known as:  ANTIVERT        Dose:  25 mg   Take 1 tablet (25 mg) by mouth every 6 hours as needed for dizziness   Refills:  0       omega 3 1000 MG Caps        Dose:  1 g   Take 1 g by mouth daily   Refills:  0       polyethylene glycol 0.4%- propylene glycol 0.3% 0.4-0.3 % Soln ophthalmic solution   Commonly known as:  SYSTANE ULTRA        Dose:  1 drop   Place 1 drop into both eyes every hour as needed for dry eyes   Refills:  0       polyethylene glycol powder   Commonly known as:  MIRALAX/GLYCOLAX        Dose:  17 g   Take 17 g by mouth daily as needed   Refills:  0       predniSONE 5 MG tablet   Commonly known as:  DELTASONE        Dose:  5 mg   Take 5 mg by mouth daily   Refills:  0       raloxifene 60 MG tablet   Commonly known as:  Evista        Dose:  60 mg   Take 1 tablet (60 mg) by mouth daily   Refills:  0       senna-docusate 8.6-50 MG per tablet   Commonly known as:  SENOKOT-S;PERICOLACE        Dose:  1 tablet   Take 1 tablet by mouth 2 times daily   Refills:  0       simvastatin 20 MG tablet   Commonly known as:  ZOCOR        Dose:  20 mg   Take 1 tablet (20 mg) by mouth At Bedtime   Refills:  0       TRAMADOL HCL PO        Dose:  50 mg   Take 50 mg by mouth every 6 hours as needed for moderate to severe pain   Refills:  0         STOP taking     aspirin 81 MG tablet           metoprolol 25 MG 24 hr tablet   Commonly known as:  TOPROL-XL                Where to get your medicines      Some of these will need a paper prescription and others can be bought over the counter. Ask your nurse if you have questions.     You don't need a prescription for these medications    - hydrALAZINE 100 MG Tabs tablet  - lisinopril 40 MG tablet             Protect others around you: Learn how to safely use, store and throw away your medicines at www.disposemymeds.org.              Medication List: This is a list of all your medications and when to take them. Check marks below indicate your daily home schedule. Keep this list as a reference.      Medications           Morning Afternoon Evening Bedtime As Needed    allopurinol 100 MG tablet   Commonly known as:  ZYLOPRIM   Take 1 tablet (100 mg) by mouth daily                                calcium carbonate 1500 (600 CA) MG tablet   Commonly known as:  OS-ABELINO 600 mg [place]. Ca   Take 1 tablet (600 mg) by mouth 2 times daily                                CENTRUM SILVER per tablet   Take 1 tablet by mouth daily                                CYANOCOBALAMIN SL   Place 500 mcg under the tongue daily                                folic acid 1 MG tablet   Commonly known as:  FOLVITE   Take 1 tablet (1 mg) by mouth daily                                gabapentin 300 MG capsule   Commonly known as:  NEURONTIN   Take 300 mg by mouth At Bedtime   Last time this was given:  300 mg on 1/22/2017  8:24 PM                                hydrALAZINE 100 MG Tabs tablet   Commonly known as:  APRESOLINE   Take 1 tablet (100 mg) by mouth 3 times daily   Last time this was given:  100 mg on 1/23/2017  8:46 AM                                lisinopril 40 MG tablet   Commonly known as:  PRINIVIL/ZESTRIL   Take 1 tablet (40 mg) by mouth daily   Last time this was given:  40 mg on 1/23/2017  8:46 AM                                meclizine 25 MG tablet   Commonly known as:  ANTIVERT   Take 1 tablet (25 mg) by mouth every 6 hours as needed for dizziness                                omega 3 1000 MG Caps   Take 1 g by mouth daily                                polyethylene glycol 0.4%- propylene glycol 0.3% 0.4-0.3 % Soln ophthalmic solution   Commonly known as:  SYSTANE ULTRA   Place 1 drop into both eyes every hour as needed for dry eyes                                polyethylene glycol powder   Commonly known as:  MIRALAX/GLYCOLAX   Take 17 g by mouth daily  as needed                                predniSONE 5 MG tablet   Commonly known as:  DELTASONE   Take 5 mg by mouth daily                                raloxifene 60 MG tablet   Commonly known as:  Evista   Take 1 tablet (60 mg) by mouth daily                                senna-docusate 8.6-50 MG per tablet   Commonly known as:  SENOKOT-S;PERICOLACE   Take 1 tablet by mouth 2 times daily                                simvastatin 20 MG tablet   Commonly known as:  ZOCOR   Take 1 tablet (20 mg) by mouth At Bedtime                                TRAMADOL HCL PO   Take 50 mg by mouth every 6 hours as needed for moderate to severe pain                                TYLENOL PO   Take 650 mg by mouth every 6 hours as needed for mild pain or fever   Last time this was given:  650 mg on 1/22/2017 11:53 AM

## 2017-01-15 NOTE — IP AVS SNAPSHOT
"` `           [facility] [address] [facility]: [phone]                                              INTERAGENCY TRANSFER FORM - NURSING   1/15/2017                    Hospital Admission Date: 1/15/2017  AUDRA ALICEA   : 1931  Sex: Female        Attending Provider: Suzi Kinsey MD     Allergies:  Colchicine, Hydroxychloroquine, Pilocarpine, Probenecid, Tizanidine    Infection:  None   Service:  ICU    Ht:  1.651 m (5' 5\")   Wt:  66 kg (145 lb 8.1 oz)   Admission Wt:  67.132 kg (148 lb)    BMI:  24.21 kg/m 2   BSA:  1.74 m 2            Patient PCP Information     Provider PCP Type    Graciela Jameson MD General      Current Code Status     Date Active Code Status Order ID Comments User Context       1/15/2017  9:41 AM DNR/DNI [id number]  Suzi Kinsey MD Inpatient       Code Status History     Date Active Date Inactive Code Status Order ID Comments User Context    2016  1:01 AM 2016  6:38 PM Full Code [id number]  Austen Mcgowan MD Inpatient      Advance Directives        Does patient have a scanned Advance Directive/ACP document in EPIC?           Yes        Hospital Problems as of 2017              Priority Class Noted POA    ICH (intracerebral hemorrhage) (H) Medium  1/15/2017 Yes      Non-Hospital Problems as of 2017              Priority Class Noted    Intertrochanteric fracture of left hip, closed, initial encounter (H) Medium  2016    Anemia due to blood loss, acute Medium  2016    Thrombocytopenia (H) Medium  2016    Benign essential hypertension Medium  2016    CKD (chronic kidney disease) stage 3, GFR 30-59 ml/min Medium  2016    Physical deconditioning Medium  2016      Immunizations     None         END      ASSESSMENT     Discharge Profile Flowsheet     EXPECTED DISCHARGE     Patient's communication style  spoken language (English or Bilingual) 01/15/17 1002    Expected Discharge Date  17 ([facility] at 1630) " "01/23/17 1527   FINAL RESOURCES      DISCHARGE NEEDS ASSESSMENT     Resources List  Transitional Care 12/19/16 1044    Concerns To Be Addressed  discharge planning concerns 01/20/17 1733   PAS Number  [id number] 12/19/16 1044    Equipment Currently Used at Home  walker, rolling 01/17/17 1052   Senior Linkage Line Referral Placed  12/19/16 12/19/16 1044    Transportation Available  family or friend will provide 01/17/17 1052   Referrals Placed  Post Acute Facilities;Senior Linkage Line;Transportation 12/19/16 1044    # of Referrals Placed by CTS  Post Acute Facilities 01/20/17 1733   SKIN      GASTROINTESTINAL (ADULT,PEDIATRIC,OB)     Inspection  Full 01/22/17 2204    GI WDL  WDL 01/22/17 2204   Skin WDL  WDL 01/22/17 2204    Abdominal Appearance  flat 01/18/17 1410   Skin Temperature  warm 01/18/17 2021    All Quadrants Bowel Sounds  audible and active in all quadrants 01/22/17 2204   Skin Moisture  -- 01/22/17 0159    Last Bowel Movement  01/22/17 01/22/17 2204   SAFETY      Passing flatus  yes 01/22/17 2204   Safety WDL  WDL 01/23/17 0509    COMMUNICATION ASSESSMENT     Safety Factors  side rails raised x 3;wheels locked;call light in reach 01/22/17 0513                 Assessment WDL (Within Defined Limits) Definitions           Safety WDL     Effective: 09/28/15    Row Information: <b>WDL Definition:</b> Bed in low position, wheels locked; call light in reach; upper side rails up x 2; ID band on<br> <font color=\"gray\"><i>Item=AS safety wdl>>List=AS safety wdl>>Version=F14</i></font>      Skin WDL     Effective: 09/28/15    Row Information: <b>WDL Definition:</b> Warm; dry; intact; elastic; without discoloration; pressure points without redness<br> <font color=\"gray\"><i>Item=AS skin wdl>>List=AS skin wdl>>Version=F14</i></font>      Vitals     Vital Signs Flowsheet     QUICK ADDS     Side Effects Monitoring: Respiratory Depth  N 01/23/17 0940    Quick Adds  Respiratory Monitoring (EtCO2 only) 01/23/17 0853   Side " "Effects Monitoring: Sedation Level  1 01/23/17 0940    COMMENTS     HEIGHT AND WEIGHT      Comments  PRN labetalol given with scheduled hydralazine 01/19/17 1426   Height  1.651 m (5' 5\") 01/15/17 0951    VITAL SIGNS     Height Method  Stated 01/15/17 0707    Temp  98.1  F (36.7  C) 01/23/17 1516   Weight  66 kg (145 lb 8.1 oz) 01/23/17 0620    Temp src  Oral 01/23/17 1516   BSA (Calculated - sq m)  1.69 01/15/17 0951    Resp  16 01/23/17 1516   BMI (Calculated)  22.98 01/15/17 0951    Pulse  69 01/21/17 0837   POSITIONING      Heart Rate  74 01/23/17 1516   Body Position  supine, legs elevated 01/23/17 1523    Pulse/Heart Rate Source  Monitor 01/23/17 1516   Head of Bed (HOB)  HOB at 20 degrees 01/23/17 1523    BP  113/60 mmHg 01/23/17 1516   Positioning/Transfer Devices  pillows 01/23/17 1523    BP Location  Left arm 01/23/17 1516   DAILY CARE      OXYGEN THERAPY     Activity Type  activity adjusted per tolerance 01/22/17 2204    SpO2  98 % 01/23/17 1516   Activity Level of Assistance  assistance, 2 people 01/22/17 2204    O2 Device  None (Room air) 01/23/17 1516   Activity Assistive Device  mechanical lift 01/22/17 2204    Oxygen Delivery  2 LPM 01/15/17 1813   Additional Documentation  Activity Device Assistance (Row) 01/18/17 0941    PAIN/COMFORT     ECG      Patient Currently in Pain  denies 01/23/17 0840   ECG Rhythm  Normal sinus rhythm 01/18/17 1216    Preferred Pain Scale  CPOT (Critical-Care Pain Observation Tool) 01/18/17 1216   Ectopy  Other (Comment) 01/18/17 1216    Patient's Stated Pain Goal  Unable to Assess 01/18/17 0626   Ectopy Frequency  Rare 01/16/17 0427    0-10 Pain Scale  6 01/22/17 1153   Lead Monitored  Lead II;V 1 01/16/17 0616    FACES Pain Rating  0-->no hurt 01/15/17 0707   Rhythm Comment  ST Segment Normal 01/16/17 0616    Pain Intervention(s)  Medication (See eMAR) 01/22/17 1438   Equipment  electrodes changed;telemetry batteries changed 01/19/17 1130    Response to Interventions  " Absence of nonverbal indicators of pain 01/17/17 2244   EKG MONITORING      CRITICAL-CARE PAIN OBSERVATION TOOL (CPOT)     Cardiac Regularity  Regular 01/15/17 0751    Facial Expression  0 01/19/17 1552   DAISY COMA SCALE      Body Movements  0 01/19/17 1552   Best Eye Response  4-->(E4) spontaneous 01/18/17 1425    Compliance w/ventilator (intubated patients)  Extubated 01/18/17 1216   Best Motor Response  6-->(M6) obeys commands 01/18/17 1425    Vocalization (extubated patients)  0 01/18/17 1216   Best Verbal Response  2-->(V2) incomprehensible speech 01/18/17 1425    Muscle Tension  0 01/18/17 1216   Daisy Coma Scale Score  12 01/18/17 1425    Total  0 01/18/17 1216   POINT OF CARE TESTING      ANALGESIA SIDE EFFECTS MONITORING     Puncture Site  fingertip 01/15/17 1620    Side Effects Monitoring: Respiratory Quality  R 01/23/17 0940   Bedside Glucose (mg/dl )   98 mg/dl 01/15/17 1620            Patient Lines/Drains/Airways Status    Active LINES/DRAINS/AIRWAYS     Name: Placement date: Placement time: Site: Days: Last dressing change:    Peripheral IV 01/16/17 Right Lower forearm 01/16/17  1537  Lower forearm  7     Incision/Surgical Site 12/16/16 Left Hip 12/16/16  1520   38             Patient Lines/Drains/Airways Status    Active PICC/CVC     **None**            Intake/Output Detail Report     Date Intake     Output Net    Shift P.O. I.V. IV Piggyback Total Urine Total       Day 01/22/17 0700 - 01/22/17 1459 480 -- -- 480 -- -- 480    Rossy 01/22/17 1500 - 01/22/17 2259 360 -- -- 360 -- -- 360    Noc 01/22/17 2300 - 01/23/17 0659 -- -- -- -- -- -- 0    Day 01/23/17 0700 - 01/23/17 1459 480 -- -- 480 -- -- 480    Rossy 01/23/17 1500 - 01/23/17 2259 -- -- -- -- -- -- 0      Last Void/BM       Most Recent Value    Urine Occurrence 1 at 01/23/2017 1522    Stool Occurrence 1 at 01/22/2017 1800      Case Management/Discharge Planning     Case Management/Discharge Planning Flowsheet     REFERRAL INFORMATION      Major Change/Loss/Stressor  hospitalization 01/15/17 1002    Did the Initial Social Work Assessment result in a Social Work Case?  Yes 01/22/17 1603   EXPECTED DISCHARGE      Admission Type  inpatient 01/22/17 1603   Expected Discharge Date  01/23/17 ([facility] at 1630) 01/23/17 1527    Arrived From  nursing facility 01/22/17 1603   ASSESSMENT/CONCERNS TO BE ADDRESSED      Referral Source  interdisciplinary rounds 01/22/17 1603   Concerns To Be Addressed  discharge planning concerns 01/20/17 1733    # of Referrals Placed by CTS  Post Acute Facilities 01/20/17 1733   DISCHARGE PLANNING      Post Acute Facilities  TCU 01/23/17 1523   Transportation Available  family or friend will provide 01/17/17 1052    Reason For Consult  discharge planning 01/23/17 1523   FINAL RESOURCES      Record Reviewed  medical record 01/23/17 1523   Equipment Currently Used at Home  walker, rolling 01/17/17 1052     Assigned to Case  Anna ConnieMiguel 01/23/17 1523   Resources List  Transitional Care 12/19/16 1044    LIVING ENVIRONMENT     PAS Number  [id number] 12/19/16 1044    Lives With  significant other 01/22/17 1603   Senior Linkage Line Referral Placed  12/19/16 12/19/16 1044    Living Arrangements  condominium 01/22/17 1603   Referrals Placed  Post Acute Facilities;Senior Linkage Line;Transportation 12/19/16 1044    Provides Primary Care For  no one 01/15/17 1002   ABUSE RISK SCREEN      HOME SAFETY     QUESTION TO PATIENT:  Has a member of your family or a partner(now or in the past) intimidated, hurt, manipulated, or controlled you in any way?  patient declined to answer or is unable to answer 01/15/17 1002    Patient Feels Safe Living in Home?  no 01/22/17 1603   QUESTION TO PATIENT: Do you feel safe going back to the place where you are living?  patient declined to answer or is unable to answer 01/15/17 1002    ASSESSMENT OF FAMILY/SOCIAL SUPPORT     OBSERVATION: Is there reason to believe there has been maltreatment  of a vulnerable adult (ie. Physical/Sexual/Emotional abuse, self neglect, lack of adequate food, shelter, medical care, or financial exploitation)?  no 01/15/17 1002    Who is your support system?  Partner 01/22/17 1603   (R) MENTAL HEALTH SUICIDE RISK      Partner's Name  Caprice 01/22/17 1603   Are you depressed or being treated for depression?  No 01/22/17 0803    Description of Support System  Supportive;Involved 01/22/17 1603   HOMICIDE RISK      COPING/STRESS     Homicidal Ideation  no 01/15/17 1002

## 2017-01-15 NOTE — IP AVS SNAPSHOT
"[place] [address] [facility]: [phone]                                              INTERAGENCY TRANSFER FORM - PHYSICIAN ORDERS   1/15/2017                    Hospital Admission Date: 1/15/2017  AUDRA ALICEA   : 1931  Sex: Female        Attending Provider: Suzi Kinsey MD     Allergies:  Colchicine, Hydroxychloroquine, Pilocarpine, Probenecid, Tizanidine    Infection:  None   Service:  ICU    Ht:  1.651 m (5' 5\")   Wt:  66 kg (145 lb 8.1 oz)   Admission Wt:  67.132 kg (148 lb)    BMI:  24.21 kg/m 2   BSA:  1.74 m 2            Patient PCP Information     Provider PCP Type    Graciela Jameson MD General      ED Clinical Impression     Diagnosis Description Comment Added By Time Added    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]  Sho Lazo MD 1/15/2017  8:42 AM    Essential hypertension, malignant [I10] Essential hypertension, malignant [I10]  Sho Lazo MD 1/15/2017  8:42 AM      Hospital Problems as of 2017              Priority Class Noted POA    ICH (intracerebral hemorrhage) (H) Medium  1/15/2017 Yes      Non-Hospital Problems as of 2017              Priority Class Noted    Intertrochanteric fracture of left hip, closed, initial encounter (H) Medium  2016    Anemia due to blood loss, acute Medium  2016    Thrombocytopenia (H) Medium  2016    Benign essential hypertension Medium  2016    CKD (chronic kidney disease) stage 3, GFR 30-59 ml/min Medium  2016    Physical deconditioning Medium  2016      Code Status History     Date Active Date Inactive Code Status Order ID Comments User Context    2016  1:01 AM 2016  6:38 PM Full Code [id number]  Austen Mcgowan MD Inpatient         Medication Review      START taking        Dose / Directions Comments    hydrALAZINE 100 MG Tabs tablet   Commonly known as:  APRESOLINE   Used for:  Malignant hypertension    "     Dose:  100 mg   Take 1 tablet (100 mg) by mouth 3 times daily   Quantity:  60 tablet   Refills:  0        lisinopril 40 MG tablet   Commonly known as:  PRINIVIL/ZESTRIL   Used for:  Essential hypertension, malignant        Dose:  40 mg   Take 1 tablet (40 mg) by mouth daily   Quantity:  30 tablet   Refills:  0          CONTINUE these medications which have NOT CHANGED        Dose / Directions Comments    * ACETAMINOPHEN PO        Dose:  650 mg   Take 650 mg by mouth 3 times daily   Refills:  0        * TYLENOL PO        Dose:  650 mg   Take 650 mg by mouth every 6 hours as needed for mild pain or fever   Refills:  0        allopurinol 100 MG tablet   Commonly known as:  ZYLOPRIM        Dose:  100 mg   Take 1 tablet (100 mg) by mouth daily   Refills:  0        calcium carbonate 1500 (600 CA) MG tablet   Commonly known as:  OS-ABELINO 600 mg [place]. Ca        Dose:  1 tablet   Take 1 tablet (600 mg) by mouth 2 times daily   Refills:  0        CENTRUM SILVER per tablet        Dose:  1 tablet   Take 1 tablet by mouth daily   Refills:  0        CYANOCOBALAMIN SL        Dose:  500 mcg   Place 500 mcg under the tongue daily   Refills:  0        folic acid 1 MG tablet   Commonly known as:  FOLVITE   Used for:  Intertrochanteric fracture of left hip, closed, initial encounter (H)        Dose:  1 mg   Take 1 tablet (1 mg) by mouth daily   Quantity:  30 tablet   Refills:  0        gabapentin 300 MG capsule   Commonly known as:  NEURONTIN        Dose:  300 mg   Take 300 mg by mouth At Bedtime   Refills:  0        meclizine 25 MG tablet   Commonly known as:  ANTIVERT        Dose:  25 mg   Take 1 tablet (25 mg) by mouth every 6 hours as needed for dizziness   Refills:  0        omega 3 1000 MG Caps        Dose:  1 g   Take 1 g by mouth daily   Refills:  0        polyethylene glycol 0.4%- propylene glycol 0.3% 0.4-0.3 % Soln ophthalmic solution   Commonly known as:  SYSTANE ULTRA        Dose:  1 drop   Place 1 drop into both eyes  every hour as needed for dry eyes   Refills:  0        polyethylene glycol powder   Commonly known as:  MIRALAX/GLYCOLAX        Dose:  17 g   Take 17 g by mouth daily as needed   Refills:  0        predniSONE 5 MG tablet   Commonly known as:  DELTASONE        Dose:  5 mg   Take 5 mg by mouth daily   Refills:  0        raloxifene 60 MG tablet   Commonly known as:  Evista        Dose:  60 mg   Take 1 tablet (60 mg) by mouth daily   Refills:  0        senna-docusate 8.6-50 MG per tablet   Commonly known as:  SENOKOT-S;PERICOLACE        Dose:  1 tablet   Take 1 tablet by mouth 2 times daily   Refills:  0        simvastatin 20 MG tablet   Commonly known as:  ZOCOR        Dose:  20 mg   Take 1 tablet (20 mg) by mouth At Bedtime   Refills:  0        TRAMADOL HCL PO        Dose:  50 mg   Take 50 mg by mouth every 6 hours as needed for moderate to severe pain   Refills:  0        * Notice:  This list has 2 medication(s) that are the same as other medications prescribed for you. Read the directions carefully, and ask your doctor or other care provider to review them with you.      STOP taking     aspirin 81 MG tablet           metoprolol 25 MG 24 hr tablet   Commonly known as:  TOPROL-XL                   Summary of Visit     Reason for your hospital stay       CVA             After Care     Activity - Up ad tanisha           Advance Diet as Tolerated       Follow this diet upon discharge: Orders Placed This Encounter  Snacks/Supplements Adult: Magic Cup; With Meals  Dysphagia Diet Level 1 Pureed Nectar Thickened Liquids (pre-thickened or use instant food thickener)       General info for SNF       Length of Stay Estimate: Short Term Care: Estimated # of Days <30  Condition at Discharge: Stable  Level of care:skilled   Rehabilitation Potential: Fair  Admission H&P remains valid and up-to-date: Yes  Recent Chemotherapy: N/A  Use Nursing Home Standing Orders: Yes       Mantoux instructions       Give two-step Mantoux (PPD) Per  Facility Policy Yes             Referrals     Occupational Therapy Adult Consult       Evaluate and treat as clinically indicated.    Reason:  CVA       Physical Therapy Adult Consult       Evaluate and treat as clinically indicated.    Reason:  CVA       Speech Language Path Adult Consult       Evaluate and treat as clinically indicated.    Reason:  CVA             Statement of Approval     Ordered          01/23/17 1401  I have reviewed and agree with all the recommendations and orders detailed in this document.   EFFECTIVE NOW     Approved and electronically signed by:  Sole Velez MD

## 2017-01-15 NOTE — IP AVS SNAPSHOT
"    [facility]: [phone]                                              INTERAGENCY TRANSFER FORM - LAB / IMAGING / EKG / EMG RESULTS   1/15/2017                    Hospital Admission Date: 1/15/2017  AUDRA ALICEA   : 1931  Sex: Female        Attending Provider: Suzi Kinsey MD     Allergies:  Colchicine, Hydroxychloroquine, Pilocarpine, Probenecid, Tizanidine    Infection:  None   Service:  ICU    Ht:  1.651 m (5' 5\")   Wt:  66 kg (145 lb 8.1 oz)   Admission Wt:  67.132 kg (148 lb)    BMI:  24.21 kg/m 2   BSA:  1.74 m 2            Patient PCP Information     Provider PCP Type    Graciela Jameson MD General         Lab Results - 3 Days (17 - 17)      Basic metabolic panel [[id number]] (Abnormal)  Resulted: 17 0733, Result status: Final result    Ordering provider: Suzi Kinsey MD  17 0000 Resulting lab: [facility]    Specimen Information    Type Source Collected On   Blood  17 0710          Components       Value Reference Range Flag Lab   Sodium 145 133 - 144 mmol/L H FrStHsLb   Potassium 4.0 3.4 - 5.3 mmol/L  FrStHsLb   Chloride 113 94 - 109 mmol/L H FrStHsLb   Carbon Dioxide 23 20 - 32 mmol/L  FrStHsLb   Anion Gap 9 3 - 14 mmol/L  FrStHsLb   Glucose 104 70 - 99 mg/dL H FrStHsLb   Urea Nitrogen 41 7 - 30 mg/dL H FrStHsLb   Creatinine 1.12 0.52 - 1.04 mg/dL H FrStHsLb   GFR Estimate 46 >60 mL/min/1.7m2 L FrStHsLb   Comment:  Non  GFR Calc   GFR Estimate If Black 56 >60 mL/min/1.7m2 L FrStHsLb   Comment:  African American GFR Calc   Calcium 8.5 8.5 - 10.1 mg/dL  FrStHsLb            CBC with platelets [[id number]] (Abnormal)  Resulted: 17 0720, Result status: Final result    Ordering provider: Suzi Kinsey MD  17 0000 Resulting lab: [facility]    Specimen Information    Type Source Collected On   Blood  17 0710          Components       Value Reference Range Flag Lab "   WBC 6.0 4.0 - 11.0 10e9/L  FrStHsLb   RBC Count 3.28 3.8 - 5.2 10e12/L L FrStHsLb   Hemoglobin 10.2 11.7 - 15.7 g/dL L FrStHsLb   Hematocrit 31.9 35.0 - 47.0 % L FrStHsLb   MCV 97 78 - 100 fl  FrStHsLb   MCH 31.1 26.5 - 33.0 pg  FrStHsLb   MCHC 32.0 31.5 - 36.5 g/dL  FrStHsLb   RDW 16.9 10.0 - 15.0 % H FrStHsLb   Platelet Count 125 150 - 450 10e9/L L FrStHsLb            Basic metabolic panel [[id number]] (Abnormal)  Resulted: 01/22/17 0831, Result status: Final result    Ordering provider: Suzi Kinsey MD  01/22/17 0000 Resulting lab: [facility]    Specimen Information    Type Source Collected On   Blood  01/22/17 0750          Components       Value Reference Range Flag Lab   Sodium 147 133 - 144 mmol/L H FrStHsLb   Potassium 3.8 3.4 - 5.3 mmol/L  FrStHsLb   Chloride 115 94 - 109 mmol/L H FrStHsLb   Carbon Dioxide 21 20 - 32 mmol/L  FrStHsLb   Anion Gap 11 3 - 14 mmol/L  FrStHsLb   Glucose 120 70 - 99 mg/dL H FrStHsLb   Urea Nitrogen 35 7 - 30 mg/dL H FrStHsLb   Creatinine 0.99 0.52 - 1.04 mg/dL  FrStHsLb   GFR Estimate 53 >60 mL/min/1.7m2 L FrStHsLb   Comment:  Non  GFR Calc   GFR Estimate If Black 64 >60 mL/min/1.7m2  FrStHsLb   Comment:  African American GFR Calc   Calcium 8.8 8.5 - 10.1 mg/dL  FrStHsLb            CBC with platelets [[id number]] (Abnormal)  Resulted: 01/22/17 0800, Result status: Final result    Ordering provider: Suzi Kinsey MD  01/22/17 0000 Resulting lab: [facility]    Specimen Information    Type Source Collected On   Blood  01/22/17 0750          Components       Value Reference Range Flag Lab   WBC 5.0 4.0 - 11.0 10e9/L  FrStHsLb   RBC Count 3.37 3.8 - 5.2 10e12/L L FrStHsLb   Hemoglobin 10.4 11.7 - 15.7 g/dL L FrStHsLb   Hematocrit 32.6 35.0 - 47.0 % L FrStHsLb   MCV 97 78 - 100 fl  FrStHsLb   MCH 30.9 26.5 - 33.0 pg  FrStHsLb   MCHC 31.9 31.5 - 36.5 g/dL  FrStHsLb   RDW 16.6 10.0 - 15.0 % H FrStHsLb   Platelet Count 143 150 - 450  10e9/L L FrStHsLb            Blood culture [[id number]]  Resulted: 01/22/17 0329, Result status: Final result    Ordering provider: Paul Reddy MD  01/15/17 2014 Resulting lab: [facility]    Specimen Information    Type Source Collected On   Blood  01/15/17 2135          Components       Value Reference Range Flag Lab   Specimen Description Blood Left Hand   FrStHsLb   Culture Micro No growth   226   Micro Report Status FINAL 01/22/2017   226            Blood culture [[id number]]  Resulted: 01/22/17 0329, Result status: Final result    Ordering provider: Paul Reddy MD  01/15/17 2014 Resulting lab: [facility]    Specimen Information    Type Source Collected On   Blood  01/15/17 2130          Components       Value Reference Range Flag Lab   Specimen Description Blood Right Arm   FrStHsLb   Special Requests Aerobic and anaerobic bottles received   FrStHsLb   Culture Micro No growth   226   Micro Report Status FINAL 01/22/2017   226            Basic metabolic panel [[id number]] (Abnormal)  Resulted: 01/21/17 0914, Result status: Final result    Ordering provider: Suzi Kinsey MD  01/21/17 0000 Resulting lab: [facility]    Specimen Information    Type Source Collected On   Blood  01/21/17 0830          Components       Value Reference Range Flag Lab   Sodium 147 133 - 144 mmol/L H FrStHsLb   Potassium 3.9 3.4 - 5.3 mmol/L  FrStHsLb   Chloride 115 94 - 109 mmol/L H FrStHsLb   Carbon Dioxide 21 20 - 32 mmol/L  FrStHsLb   Anion Gap 11 3 - 14 mmol/L  FrStHsLb   Glucose 112 70 - 99 mg/dL H FrStHsLb   Urea Nitrogen 31 7 - 30 mg/dL H FrStHsLb   Creatinine 1.02 0.52 - 1.04 mg/dL  FrStHsLb   GFR Estimate 51 >60 mL/min/1.7m2 L FrStHsLb   Comment:  Non  GFR Calc   GFR Estimate If Black 62 >60 mL/min/1.7m2  FrStHsLb   Comment:  African American GFR Calc   Calcium 8.7 8.5 - 10.1 mg/dL  FrStHsLb            CBC with platelets [[id number]] (Abnormal)   Resulted: 01/21/17 0859, Result status: Final result    Ordering provider: Suzi Kinsey MD  01/21/17 0000 Resulting lab: [facility]    Specimen Information    Type Source Collected On   Blood  01/21/17 0830          Components       Value Reference Range Flag Lab   WBC 4.6 4.0 - 11.0 10e9/L  FrStHsLb   RBC Count 3.21 3.8 - 5.2 10e12/L L FrStHsLb   Hemoglobin 9.9 11.7 - 15.7 g/dL L FrStHsLb   Hematocrit 31.2 35.0 - 47.0 % L FrStHsLb   MCV 97 78 - 100 fl  FrStHsLb   MCH 30.8 26.5 - 33.0 pg  FrStHsLb   MCHC 31.7 31.5 - 36.5 g/dL  FrStHsLb   RDW 16.5 10.0 - 15.0 % H FrStHsLb   Platelet Count 140 150 - 450 10e9/L L FrStHsLb            Basic metabolic panel [[id number]] (Abnormal)  Resulted: 01/20/17 0847, Result status: Final result    Ordering provider: Suzi Kinsey MD  01/20/17 0000 Resulting lab: [facility]    Specimen Information    Type Source Collected On   Blood  01/20/17 0820          Components       Value Reference Range Flag Lab   Sodium 147 133 - 144 mmol/L H FrStHsLb   Potassium 3.4 3.4 - 5.3 mmol/L  FrStHsLb   Chloride 116 94 - 109 mmol/L H FrStHsLb   Carbon Dioxide 20 20 - 32 mmol/L  FrStHsLb   Anion Gap 11 3 - 14 mmol/L  FrStHsLb   Glucose 104 70 - 99 mg/dL H FrStHsLb   Urea Nitrogen 28 7 - 30 mg/dL  FrStHsLb   Creatinine 0.96 0.52 - 1.04 mg/dL  FrStHsLb   GFR Estimate 55 >60 mL/min/1.7m2 L FrStHsLb   Comment:  Non  GFR Calc   GFR Estimate If Black 66 >60 mL/min/1.7m2  FrStHsLb   Comment:  African American GFR Calc   Calcium 8.5 8.5 - 10.1 mg/dL  FrStHsLb            CBC with platelets [[id number]] (Abnormal)  Resulted: 01/20/17 0831, Result status: Final result    Ordering provider: Suzi Kinsey MD  01/20/17 0000 Resulting lab: [facility]    Specimen Information    Type Source Collected On   Blood  01/20/17 0820          Components       Value Reference Range Flag Lab   WBC 4.4 4.0 - 11.0 10e9/L  FrStHsLb   RBC Count 2.97 3.8 -  5.2 10e12/L L FrStHsLb   Hemoglobin 9.3 11.7 - 15.7 g/dL L FrStHsLb   Hematocrit 28.8 35.0 - 47.0 % L FrStHsLb   MCV 97 78 - 100 fl  FrStHsLb   MCH 31.3 26.5 - 33.0 pg  FrStHsLb   MCHC 32.3 31.5 - 36.5 g/dL  FrStHsLb   RDW 16.7 10.0 - 15.0 % H FrStHsLb   Platelet Count 176 150 - 450 10e9/L  FrStHsLb            Testing Performed By     Lab - Abbreviation Name Director Address Valid Date Range    14 - FrStHsLb [facility] Unknown [address] [place] [address] 05/08/15 1057 - Present    226 - Unknown [facility] Unknown [address] [facility] [address] 12/19/14 0955 - Present            Unresulted Labs (24h ago through future)    Start       Ordered    01/16/17 0600  CBC with platelets   DAILY,   Routine     Comments:  Last Lab Result: HEMOGLOBIN (g/dL)       Date                     Value                 01/15/2017               9.8*             ----------    01/15/17 0941    01/16/17 0600  Basic metabolic panel   DAILY,   Routine      01/15/17 0941         Imaging Results - 3 Days (01/20/17 - 01/20/17)      XR Video Swallow w Esophagram [[id number]]  Resulted: 01/20/17 1144, Result status: Final result    Ordering provider: Sole Velez MD  01/20/17 0830 Resulted by: AMELIA Yuan MD    Performed: 01/20/17 1028 - 01/20/17 1054 Resulting lab: RADIOLOGY RESULTS    Narrative:       VIDEO FLUOROSCOPIC SWALLOW STUDY WITH SPEECH THERAPY 1/20/2017 10:54  AM     HISTORY: Dysphagia.    FLUORO TIME: 2 minutes.    Video runs: 8    FINDINGS: The patient was observed swallowing a variety of  consistencies from solid to thin liquid. With thin consistency, there  was delayed swallow with premature pooling in the vallecula and  piriform sinuses. With thin consistency by cup, there was a single  instance of trace laryngeal penetration. No slade aspiration was  demonstrated. Mild residual was noted within the vallecula which the  patient readily cleared with additional  swallowing.      Impression:       IMPRESSION: Single instance of laryngeal penetration with thin liquid.  No slade aspiration. Delayed swallowing with premature pooling in the  vallecula and piriform sinuses. Otherwise unremarkable swallow study.    This study includes only the cervical esophagus.    CHRISTIANO DE LA PAZ MD    Specimen Information    Type Source Collected On                  Testing Performed By     Lab - Abbreviation Name Director Address Valid Date Range    104 - Rad Rslts RADIOLOGY RESULTS Unknown Unknown 02/16/05 1553 - Present            Encounter-Level Documents:     There are no encounter-level documents.      Order-Level Documents:     There are no order-level documents.

## 2017-01-15 NOTE — PHARMACY-ADMISSION MEDICATION HISTORY
Admission medication history interview status for the 1/15/2017  admission is complete. See EPIC admission navigator for prior to admission medications     Medication history source reliability:Good    Actions taken by pharmacist (provider contacted, etc): information obtained from [facility]     Additional medication history information not noted on PTA med list :None    Medication reconciliation/reorder completed by provider prior to medication history? No    Time spent in this activity: 20 min    Prior to Admission medications    Medication Sig Last Dose Taking? Auth Provider   Acetaminophen (TYLENOL PO) Take 650 mg by mouth every 6 hours as needed for mild pain or fever prn Yes Unknown, Entered By History   senna-docusate (SENOKOT-S;PERICOLACE) 8.6-50 MG per tablet Take 1 tablet by mouth 2 times daily 1/14/2017 at pm Yes Unknown, Entered By History   TRAMADOL HCL PO Take 50 mg by mouth every 6 hours as needed for moderate to severe pain prn Yes Unknown, Entered By History   polyethylene glycol (MIRALAX/GLYCOLAX) powder Take 17 g by mouth daily as needed  prn Yes Reported, Patient   ACETAMINOPHEN PO Take 650 mg by mouth 3 times daily  1/14/2017 at pm Yes Reported, Patient   folic acid (FOLVITE) 1 MG tablet Take 1 tablet (1 mg) by mouth daily 1/14/2017 at am Yes Juan Linn MD   aspirin 81 MG tablet Take 1 tablet (81 mg) by mouth daily 1/14/2017 at am Yes Juan Linn MD   CYANOCOBALAMIN SL Place 500 mcg under the tongue daily 1/14/2017 at am Yes Unknown, Entered By History   polyethylene glycol 0.4%- propylene glycol 0.3% (SYSTANE ULTRA) 0.4-0.3 % SOLN ophthalmic solution Place 1 drop into both eyes every hour as needed for dry eyes prn Yes Unknown, Entered By History   metoprolol (TOPROL-XL) 25 MG 24 hr tablet Take 1 tablet (25 mg) by mouth daily 1/14/2017 at am Yes Graciela Jameson MD   simvastatin (ZOCOR) 20 MG tablet Take 1 tablet (20 mg) by mouth At Bedtime 1/14/2017 at hs Yes Graciela Jameson MD    predniSONE (DELTASONE) 5 MG tablet Take 5 mg by mouth daily  1/14/2017 at am Yes Graciela Jameson MD   gabapentin (NEURONTIN) 300 MG capsule Take 300 mg by mouth At Bedtime  1/14/2017 at hs Yes Graciela Jameson MD   allopurinol (ZYLOPRIM) 100 MG tablet Take 1 tablet (100 mg) by mouth daily 1/14/2017 at am Yes Graciela Jameson MD   meclizine (ANTIVERT) 25 MG tablet Take 1 tablet (25 mg) by mouth every 6 hours as needed for dizziness prn Yes Graciela Jameson MD   raloxifene (EVISTA) 60 MG tablet Take 1 tablet (60 mg) by mouth daily 1/14/2017 at am Yes Graciela Jameson MD   calcium carbonate (OS-ABELINO 600 MG [place]. CA) 1500 (600 CA) MG tablet Take 1 tablet (600 mg) by mouth 2 times daily 1/14/2017 at am Yes Graciela Jameson MD   Multiple Vitamins-Minerals (CENTRUM SILVER) per tablet Take 1 tablet by mouth daily 1/14/2017 at am Yes Graciela Jameson MD   omega 3 1000 MG CAPS Take 1 g by mouth daily 1/14/2017 at am Yes Graciela Jameson MD

## 2017-01-15 NOTE — PROGRESS NOTES
01/15/17 1513   General Information   Onset Date 01/15/17   Start of Care Date 01/15/17   Referring Physician Dr. Kinsey   Patient Profile Review/OT: Additional Occupational Profile Info See Profile for full history and prior level of function   Patient/Family Goals Statement Pt did not state   Swallowing Evaluation Bedside swallow evaluation   Behaviorial Observations Lethargic   Mode of current nutrition NPO   Respiratory Status O2 Supply   Type of O2 supply Nasal cannula   Comments Intracranial hemorrhage, left thalamic parenchymal hemorrhage.  We will admit her as an inpatient to the Intensive Care Unit.  We will continue with nicardipine drip as recommended by Dr. Verdugo to keep systolic blood pressure less than 140.  We will place her on deep venous thrombosis prophylaxis mechanical only.  We will perform neuro checks on her.  We will get an MRI of the brain on 01/16/2017.  Will begin PT, OT as well as speech and swallow evaluations.     Clinical Swallow Evaluation   Oral Musculature anomalies present;unable to assess due to poor participation/comprehension   Structural Abnormalities present  (R side facial droop)   Dentition present and adequate   Mucosal Quality good   Mandibular Strength and Mobility impaired   Laryngeal Function Swallow;Voicing initiated  (decreased laryngeal elevation)   Oral Musculature Comments moderate-severe deficits   Additional Documentation Yes   Additional evaluation(s) completed today No   Clinical Swallow Eval: Thin Liquid Texture Trial   Mode of Presentation, Thin Liquids spoon;fed by clinician   Volume of Liquid or Food Presented 3 ice chips   Oral Phase of Swallow other (see comments)  (holding)   Pharyngeal Phase of Swallow impaired;reduction in laryngeal movement;other (see comments)  (O2 sats dropped to mid 70's)   Diagnostic Statement suspect silent aspiration   Clinical Swallow Eval: Nectar Thick Liquid Texture Trial   Mode of Presentation, Nectar spoon;fed by  clinician   Volume of Nectar Presented 2 tsp   Oral Phase, Nectar other (see comments)  (holding)   Pharyngeal Phase, Nectar impaired;throat clearing;other (see comments);repeated swallows  (delayed swallow, O2 sats dropped)   Diagnostic Statement overt signs of aspiration   Clinical Swallow Eval: Puree Solid Texture Trial   Mode of Presentation, Puree spoon;fed by clinician   Volume of Puree Presented 2 tsp applesauce   Oral Phase, Puree other (see comments)  (holding)   Pharyngeal Phase, Puree impaired;reduction in laryngeal movement;throat clearing;other (see comments)  (O2 sats dropped)   Diagnostic Statement overt signs of aspiration   Swallow Compensations   Swallow Compensations Pacing;Reduce amounts   Results Suspect aspiration;Suspect silent aspiration   General Therapy Interventions   Planned Therapy Interventions Dysphagia Treatment   Dysphagia treatment (PO readiness)   Swallow Eval: Clinical Impressions   Skilled Criteria for Therapy Intervention Skilled criteria met.  Treatment indicated.   Functional Assessment Scale (FAS) 1   Treatment Diagnosis severe dysphagia   Diet texture recommendations NPO   Demonstrates Need for Referral to Another Service occupational therapy;physical therapy;social work   Therapy Frequency daily   Predicted Duration of Therapy Intervention (days/wks) x7 days   Anticipated Discharge Disposition inpatient rehabilitation facility   Risks and Benefits of Treatment have been explained. Yes   Patient, family and/or staff in agreement with Plan of Care Yes   Clinical Impression Comments SLP:  Bedside swallow evaluation completed.  Pt sleepy but able to participate. Pt wearing nasal cannula. Pt presented with moderate R side facial droop, oral-motor skills not formally assessed as Pt had difficulty following 1-step directions, presented with expressive aphasia. Pt showed overt signs of aspiration with all trials of PO intake.  Pt given 3 ice chips with a delayed swallow noted, Pt  not able to initiate a second swallow, O2 sats noted to drop in the 70's after swallowing, no coughing/throat clearing noted, however, suspect Pt had silent aspiration.  Pt given 2 tsp of applesauce.  Pt noted to hold PO in oral cavity with a delayed swallow, several swallows noted for each teaspoon.   Again O2 sats dropped to mid 70's and Pt noted to have a weak cough.  Two trials of nectar-thick liquids by spoon also showed a cough and O2 sats dropped.   Total Evaluation Time   Total Evaluation Time (Minutes) 20

## 2017-01-15 NOTE — CONSULTS
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 DVT Prophylaxis  --Mechanical only  #. PT/OT/Speech  --Start  evaluations  #. Nutrition / GI Prophylaxis  --Per recommendations of speech therapy      #. Code Status: Full Code     TIME:   Neurocritical care time:  60 minutes for evaluation and management of ICH. Patient has a very high risk of deterioration  ----------------------------------------------------------------------------------  ----------------------------------------------------------------------------------  Reason for consult: ICH    Chief Complaint  ______________________________________________  aphasia  History is obtained from the patient    History of Present Illness  ______________________________________________  85 year old female, with a history of TIA, HLD, HTN, and spinal stenosis, who presents via EMS with speech difficulty and right sided weakness. Per EMS, the staff at the patient's rehab facility report that the patient was acting normally last night when she went to bed around 2200. However, when they checked on her this morning, they found that she was confused and having great difficulty talking with inappropriate speech. The patient was also having difficulty smiling and had right arm weakness. Per EMS, glucose  was 98 en route. On arrival, patient is alert. She denies any pain. Family has been alerted by her rehab facility that the patient was brought here. CT head in ER, demonstrated 2 cm left thalamic ICH without major MLS  [facility] was called for emergency management. On arrival, BP was in 180s. Nicardipine was started  On my evaluation, BP is still in 180s, nicardipine was increased to 7.5 mg/h. Patient remains awake, aphasic with right hemiplegia  Past Medical History   ______________________________________________  Past Medical History   Diagnosis Date     Neuropathy (H)      Sjoegren syndrome (H)      Arthritis      Spondylitis (H)      Hypertension      High cholesterol      Numbness and tingling      fingers & toes , > feet/toes     Vertigo      Spinal stenosis      TIA (transient ischemic attack)      Shortness of breath      per pt report no dx presently.     HLD (hyperlipidemia)      Renal disease      CKD 3     History of blood transfusion      w/HYSTERECTOMY     Past Surgical History  ______________________________________________  Past Surgical History   Procedure Laterality Date     Ent surgery       Hernia repair       Gyn surgery       Eye surgery       Open reduction internal fixation hip nailing Left 12/16/2016     Procedure: OPEN REDUCTION INTERNAL FIXATION HIP NAILING;  Surgeon: Juan Linn MD;  Location:  OR     Prior to Admission Medications  ______________________________________________  Prior to Admission Medications   Prescriptions Last Dose Informant Patient Reported? Taking?   ACETAMINOPHEN PO   Yes No   Sig: Take 650 mg by mouth 3 times daily AND Give 2 tablet by mouth every 6 hours as needed for Pain   CYANOCOBALAMIN SL  Spouse/Significant Other Yes No   Sig: Place 500 mcg under the tongue daily   Multiple Vitamins-Minerals (CENTRUM SILVER) per tablet  Spouse/Significant Other Yes No   Sig: Take 1 tablet by mouth daily   allopurinol (ZYLOPRIM) 100 MG tablet  Spouse/Significant Other Yes  No   Sig: Take 1 tablet (100 mg) by mouth daily   aspirin 81 MG tablet   No No   Sig: Take 1 tablet (81 mg) by mouth daily   calcium carbonate (OS-ABELINO 600 MG [place]. [place]) 1500 (600 CA) MG tablet  Spouse/Significant Other Yes No   Sig: Take 1 tablet (600 mg) by mouth 2 times daily   folic acid (FOLVITE) 1 MG tablet   No No   Sig: Take 1 tablet (1 mg) by mouth daily   gabapentin (NEURONTIN) 300 MG capsule  Spouse/Significant Other Yes No   Sig: Take 300 mg by mouth At Bedtime    meclizine (ANTIVERT) 25 MG tablet  Spouse/Significant Other Yes No   Sig: Take 1 tablet (25 mg) by mouth every 6 hours as needed for dizziness   metoprolol (TOPROL-XL) 25 MG 24 hr tablet  Spouse/Significant Other Yes No   Sig: Take 1 tablet (25 mg) by mouth daily   omega 3 1000 MG CAPS  Spouse/Significant Other Yes No   Sig: Take 1 g by mouth daily   polyethylene glycol (MIRALAX/GLYCOLAX) powder   Yes No   Sig: Take 17 g by mouth daily   polyethylene glycol 0.4%- propylene glycol 0.3% (SYSTANE ULTRA) 0.4-0.3 % SOLN ophthalmic solution   Yes No   Sig: Place 1 drop into both eyes every hour as needed for dry eyes   predniSONE (DELTASONE) 5 MG tablet  Spouse/Significant Other Yes No   Sig: Take 5 mg by mouth daily    raloxifene (EVISTA) 60 MG tablet  Spouse/Significant Other Yes No   Sig: Take 1 tablet (60 mg) by mouth daily   sennosides (SENOKOT) 8.6 MG tablet   Yes No   Sig: Take 1 tablet by mouth 2 times daily   simvastatin (ZOCOR) 20 MG tablet  Spouse/Significant Other Yes No   Sig: Take 1 tablet (20 mg) by mouth At Bedtime   traMADol-acetaminophen (ULTRACET) 37.5-325 MG per tablet   No No   Sig: Take 1 tablet by mouth every 6 hours as needed for moderate pain      Facility-Administered Medications: None     Allergies  Allergies   Allergen Reactions     Colchicine      Diarrhea       Hydroxychloroquine      Pilocarpine      Sweating, chills.     Probenecid      Diarrhea       Tizanidine      Increased dizzines but prescribed for Pain management  "      Social History  ______________________________________________  Social History     Social History     Marital Status:      Spouse Name: N/A     Number of Children: N/A     Years of Education: N/A     Social History Main Topics     Smoking status: Former Smoker     Smokeless tobacco: None     Alcohol Use: 0.0 oz/week     0 Standard drinks or equivalent per week      Comment: Daily - 5-8 oz      Drug Use: No     Sexual Activity: No     Other Topics Concern     None     Social History Narrative       Family History  ______________________________________________  History reviewed. No pertinent family history.      Review of Systems  ______________________________________________  Review of systems is not obtainable due to patient factors - aphasia      Physical Exam  ______________________________________________  Weight:148 lbs 0 oz; Height:5' 5\"  Temp: 98.4  F (36.9  C) Temp src: Temporal BP: (!) 200/95 mmHg (Simultaneous filing. User may not have seen previous data.)   Heart Rate: 86 Resp: 11 SpO2: 97 % O2 Device: None (Room air)    General Appearance:  No acute distress  Neuro:       Mental Status Exam:   Awake, alert, oriented X3. Speech slurred and language is minimal. Mental status is normal       Cranial Nerves:  Pupils 3 mm, reactive. EOMI. Face sensation is normal. Face right facial weakness. Tongue and uvula are midline. Other CN are normal           Motor:  Right hemiplegia. Tone and bulk are normal           Reflexes:  Normal DTR.Toes downgoing.        Sensory:  Right hypoesthesia              Coordination:   Intact finger-to-nose and toe-to-finger tests on the left       Gait: untestable.   Neck: no nuchal rigidity, normal thyroid. No carotid bruits.    Cardiovascular: Regular rate and rhythm, no m/r/g  Lungs: Clear to auscultation  Abdomen: Soft, not tender, not distended  Extremities: No clubbing, no cyanosis, no edema    Data  ______________________________________________  All Data " personally reviewed:       Labs:   CBC RESULTS:     Recent Labs  Lab 01/15/17  0800   WBC 4.2   RBC 3.10*   HGB 9.8*   HCT 30.3*        Basic Metabolic Panel:   Recent Labs   Lab Test  01/15/17   0800 12/22/16 12/19/16   0610   NA  138  137  143   POTASSIUM  3.9  3.5  3.9   CHLORIDE  105  106  112*   CO2  24  24  22   BUN  26  23  23   CR  1.06*  0.99  0.93   GLC  85  107*  106*   ABELINO  9.0  8.6  7.9*     Liver panel:  No lab results found.  INR:  Recent Labs   Lab Test  01/15/17   0800  12/15/16   2232   INR  1.06  1.02      Lipid Profile:No lab results found.  Thyroid Panel:No lab results found.   Vitamin B12: No lab results found.   Vitamin D level: No lab results found.  A1C: No lab results found.  Troponin I: No lab results found.  Ammonia: No lab results found.  CK: No lab results found.     CRP inflammation: No lab results found.  ESR: No lab results found.    RIMA: No lab results found.    ANCA: No lab results found.   Drug Screen: No lab results found.  Alcohol level:No lab results found.  UA Results:  Recent Labs   Lab Test  01/15/17   0811   10/27/16   1435   COLOR  Light Yellow   < >  Yellow   APPEARANCE  Clear   < >  Clear   URINEGLC  Negative   < >  Negative   URINEBILI  Negative   < >  Negative   URINEKETONE  Negative   < >  Negative   SG  1.016   < >  <=1.005   UBLD  Negative   < >  Trace*   URINEPH  6.5   < >  6.0   PROTEIN  10*   < >  Negative   UROBILINOGEN   --    --   0.2   NITRITE  Negative   < >  Negative   LEUKEST  Negative   < >  Negative   RBCU  <1   --   O - 2   WBCU  <1   --   O - 2    < > = values in this interval not displayed.     Most Recent 6 Bacteria Isolates From Any Culture (See EPIC Reports for Culture Details):No lab results found.     Cardiac US:   --       Neurophysiology:   --      Imaging:   All imaging studies were reviewed personally        Recent Results (from the past 24 hour(s))   CT Head w/o Contrast   Result Value    Radiologist flags Left thalamic parenchymal  hemorrhage (AA)    Narrative    CT HEAD W/O CONTRAST   1/15/2017 7:32 AM     HISTORY: woke up with right sided weakness, slurred speech and  confusion    TECHNIQUE: Axial images of the head without IV contrast material.  Radiation dose for this scan was reduced using automated exposure  control, adjustment of the mA and/or kV according to patient size, or  iterative reconstruction technique.    COMPARISON: None.    FINDINGS:  There is generalized atrophy of the brain.  Areas of low  attenuation are present in the white matter of the cerebral  hemispheres that are consistent with small vessel ischemic disease in  this age patient. There is a parenchymal hemorrhage in the left  thalamic region. This measures approximately 2 cm in AP dimension by  1.5 cm in transverse dimension and 2 cm in cephalocaudad dimension.  The volume of this lesion is approximately 3 cc. There is slight mass  effect on the third ventricle but no shift of midline structures. No  hydrocephalus The visualized portions of the sinuses and mastoids  appear normal. There is no evidence of trauma.      Impression    IMPRESSION:   1. Left thalamic parenchymal hemorrhage. Volume is approximately 3 cc.  Mild mass effect on the third ventricle but no shift of midline  structures.  2. Atrophy of the brain.  White matter changes consistent with small  vessel ischemic disease.     [Critical Result: Left thalamic parenchymal hemorrhage]    Finding was identified on 1/15/2017 7:34 AM.     Dr. Lazo was contacted by me on 1/15/2017 7:36 AM and verbalized  understanding of the critical result.    CT Head Neck Angio w/o & w Contrast    Narrative    CTA ANGIOGRAM HEAD NECK  1/15/2017 7:42 AM     HISTORY: Right-sided weakness and slurred speech. Parenchymal bleed.    TECHNIQUE:  Precontrast localizing scans were followed by CT  angiography with an injection of 70 mL [id number]-370 IV contrast with  scans through the head and neck.  Images were transferred to  a  separate 3-D workstation where multiplanar reformations and 3-D images  were created.  Estimates of carotid stenoses are made relative to the  distal internal carotid artery diameters except as noted. Radiation  dose for this scan was reduced using automated exposure control,  adjustment of the mA and/or kV according to patient size, or iterative  reconstruction technique.    COMPARISON: None.    FINDINGS: Estimates of stenosis at the carotid bifurcations are  relative to the distal internal carotids.    Right Carotid:  The right common carotid artery is normal.  Calcified  atherosclerotic plaque right carotid bifurcation. No significant  stenosis.  The right internal carotid artery is negative. Intracranial  images on the right are unremarkable.  No occluded vessels are seen.  A1 segment of the right anterior cerebral is hypoplastic    Left Carotid:  The left common carotid artery is unremarkable.   Calcified atherosclerotic plaque left carotid bifurcation. No  significant stenosis..  The left internal carotid is negative.      Intracranial images of the left are unremarkable.   No occluded  vessels are identified.    Vertebrobasilar:  The vertebral arteries are normal. The left  vertebral arises from the arch of the aorta which is a normal Right  vertebral artery is dominant.   The basilar artery and its branches  appear normal.      Impression    IMPRESSION:   1. No occluded vessels or high-grade internal intracranial vascular  stenosis.  2. Calcified plaque at the carotid bifurcations but no significant  stenosis.  3. No arterial dissection identified.  4. 2 cm parenchymal hemorrhage in the left thalamic region.    DAVID CRAWFORD MD          Intracerebral Hemorrhage:  Non-Traumatic Standard Measures for Stroke     ICH Score (within 6 hrs of admission/before surgery) done at: 0840 01/15/2017    ICH Score Tool Patients Score   Age ? 80 years 1 point 1   GCS score  3-4  5-12   13-15    2 point  1 point  0 point 0    Hematoma volume, cm3 ? 30 1 point 0   Intraventricular extension 1 point 0   Infratentorial location 1 point 0   Patient s ICH Score (0-6) = 1      Admission INR:   INR   Date Value Ref Range Status   01/15/2017 1.06 0.86 - 1.14 Final

## 2017-01-15 NOTE — IP AVS SNAPSHOT
` `     [facility]: [phone]                 INTERAGENCY TRANSFER FORM - NOTES (H&P, Discharge Summary, Consults, Procedures, Therapies)   1/15/2017                    Hospital Admission Date: 1/15/2017  MICHELLE ALICEA   : 1931  Sex: Female        Patient PCP Information     Provider PCP Type    Graciela Jameson MD General      History & Physicals     No notes of this type exist for this encounter.         Discharge Summaries      Discharge Summaries by Sole Velez MD at 2017  2:01 PM     Author:  Sole Velez MD Service:  Hospitalist Author Type:  Physician    Filed:  2017  2:03 PM Note Time:  2017  2:01 PM Status:  Signed    :  Sole Velez MD (Physician)           [facility]  Discharge Summary        Michelle Alicea MRN# [id number]   YOB: 1931 Age: 85 year old     Date of Admission:  1/15/2017  Date of Discharge:  2017  Admitting Physician:  Suzi Kinsey MD  Discharge Physician: Sole Vleez MD  Discharging Service: Hospitalist     Primary Provider: Graciela Baer  Primary Care Physician Phone Number: [phone]         Discharge Diagnoses:         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  The following problems were addressed during her hospitalization:    Non traumatic subcortical hemorrhage of the  Left cerebral hemisphere:  ---- 2 cm left thalamic hemorrhage  --- Neurology consulted and followed.    --- MRI of the brain on 17 showed slight increase of thalamic hemorrhage  --- Repeat CT today which showed slight increase of thalamic hemorrhage from the 1st CT and  no change from the last MRI  --- PT/OT/SLP followed  -- Diet per SLP  -- Video swallow study done and diet was resumed as per speech recommendation        Malignant HTN: Better controlled  --- Off Nicardipine drip  --- titrated up Hydralazin to 100 mg QID and will continue the same  --- Increased Lisinopril to 40 mg daily and continue the same    Patient was seen prior to discharge and she was found to be stable.         Code Status:      DNR / DNI         Important Results:      See below.         Pending Results:        Unresulted Labs Ordered in the Past 30 Days of this Admission     No orders found from 11/17/2016 to 1/16/2017.               Discharge Instructions and Follow-Up:               Discharge Disposition:      Discharged to nursing home         Discharge Medications:        Current Discharge Medication List      START taking these medications    Details   hydrALAZINE (APRESOLINE) 100 MG TABS tablet Take 1 tablet (100 mg) by mouth 3 times daily  Qty: 60 tablet    Associated Diagnoses: Malignant hypertension      lisinopril (PRINIVIL/ZESTRIL) 40 MG tablet Take 1 tablet (40 mg) by mouth daily  Qty: 30 tablet    Associated Diagnoses: Essential hypertension, malignant         CONTINUE these medications which have NOT CHANGED    Details   !! Acetaminophen (TYLENOL PO) Take 650 mg by mouth every 6 hours as needed for mild pain or fever      senna-docusate (SENOKOT-S;PERICOLACE) 8.6-50 MG per tablet Take 1 tablet by mouth 2 times daily      TRAMADOL HCL PO Take 50 mg by mouth every 6 hours as needed for moderate to severe pain      polyethylene glycol (MIRALAX/GLYCOLAX) powder Take 17 g by mouth daily as needed       !! ACETAMINOPHEN PO Take 650 mg by mouth 3 times daily       folic acid (FOLVITE) 1 MG tablet Take 1 tablet (1 mg) by mouth daily  Qty: 30 tablet, Refills: 0    Associated Diagnoses: Intertrochanteric fracture of left hip, closed, initial encounter (H)      CYANOCOBALAMIN SL Place 500 mcg under  "the tongue daily      polyethylene glycol 0.4%- propylene glycol 0.3% (SYSTANE ULTRA) 0.4-0.3 % SOLN ophthalmic solution Place 1 drop into both eyes every hour as needed for dry eyes      simvastatin (ZOCOR) 20 MG tablet Take 1 tablet (20 mg) by mouth At Bedtime      predniSONE (DELTASONE) 5 MG tablet Take 5 mg by mouth daily       gabapentin (NEURONTIN) 300 MG capsule Take 300 mg by mouth At Bedtime       allopurinol (ZYLOPRIM) 100 MG tablet Take 1 tablet (100 mg) by mouth daily      meclizine (ANTIVERT) 25 MG tablet Take 1 tablet (25 mg) by mouth every 6 hours as needed for dizziness      raloxifene (EVISTA) 60 MG tablet Take 1 tablet (60 mg) by mouth daily      calcium carbonate (OS-ABELINO 600 MG [place]. CA) 1500 (600 CA) MG tablet Take 1 tablet (600 mg) by mouth 2 times daily      Multiple Vitamins-Minerals (CENTRUM SILVER) per tablet Take 1 tablet by mouth daily      omega 3 1000 MG CAPS Take 1 g by mouth daily       !! - Potential duplicate medications found. Please discuss with provider.      STOP taking these medications       aspirin 81 MG tablet Comments:   Reason for Stopping:         metoprolol (TOPROL-XL) 25 MG 24 hr tablet Comments:   Reason for Stopping:                    Allergies:         Allergies   Allergen Reactions     Colchicine      Diarrhea       Hydroxychloroquine      Pilocarpine      Sweating, chills.     Probenecid      Diarrhea       Tizanidine      Increased dizzines but prescribed for Pain management            Consultations This Hospital Stay:      Consultation during this admission received from neurology         Condition and Physical Exam on Discharge:      Discharge condition: Stable   Discharge vitals: Heart Rate: 77, Blood pressure 101/47, pulse 69, temperature 98.1  F (36.7  C), temperature source Oral, resp. rate 16, height 1.651 m (5' 5\"), weight 66 kg (145 lb 8.1 oz), SpO2 96 %.       Constitutional: Awake, alert. No apparent distress   Lungs: Clear to auscultation bilaterally, " no crackles or wheezing   Cardiovascular: Regular HR, normal S1 and S2, and no murmur noted   Abdomen: Normal bowel sounds, soft, non-distended, non-tender   Skin: No rashes, no cyanosis.   Other: LE: Trace edema.                Discharge Orders for Skilled Facility (from Discharge Orders):        After Care Instructions     Activity - Up ad tanisha           Advance Diet as Tolerated       Follow this diet upon discharge: Orders Placed This Encounter  Snacks/Supplements Adult: Magic Cup; With Meals  Dysphagia Diet Level 1 Pureed Nectar Thickened Liquids (pre-thickened or use instant food thickener)            General info for SNF       Length of Stay Estimate: Short Term Care: Estimated # of Days <30  Condition at Discharge: Stable  Level of care:skilled   Rehabilitation Potential: Fair  Admission H&P remains valid and up-to-date: Yes  Recent Chemotherapy: N/A  Use Nursing Home Standing Orders: Yes            Mantoux instructions       Give two-step Mantoux (PPD) Per Facility Policy Yes                           Rehab orders for Skilled Facility (from Discharge Orders):      Referrals     Future Labs/Procedures    Occupational Therapy Adult Consult     Comments:    Evaluate and treat as clinically indicated.    Reason:  CVA    Physical Therapy Adult Consult     Comments:    Evaluate and treat as clinically indicated.    Reason:  CVA    Speech Language Path Adult Consult     Comments:    Evaluate and treat as clinically indicated.    Reason:  CVA             Discharge Time:      Greater than 30 minutes.        Image Results From This Hospital Stay (For Non-EPIC Providers):        Results for orders placed or performed during the hospital encounter of 01/15/17   CT Head w/o Contrast     Value    Radiologist flags Left thalamic parenchymal hemorrhage (AA)    Narrative    CT HEAD W/O CONTRAST   1/15/2017 7:32 AM     HISTORY: woke up with right sided weakness, slurred speech and  confusion    TECHNIQUE: Axial images of the  head without IV contrast material.  Radiation dose for this scan was reduced using automated exposure  control, adjustment of the mA and/or kV according to patient size, or  iterative reconstruction technique.    COMPARISON: None.    FINDINGS:  There is generalized atrophy of the brain.  Areas of low  attenuation are present in the white matter of the cerebral  hemispheres that are consistent with small vessel ischemic disease in  this age patient. There is a parenchymal hemorrhage in the left  thalamic region. This measures approximately 2 cm in AP dimension by  1.5 cm in transverse dimension and 2 cm in cephalocaudad dimension.  The volume of this lesion is approximately 3 cc. There is slight mass  effect on the third ventricle but no shift of midline structures. No  hydrocephalus The visualized portions of the sinuses and mastoids  appear normal. There is no evidence of trauma.      Impression    IMPRESSION:   1. Left thalamic parenchymal hemorrhage. Volume is approximately 3 cc.  Mild mass effect on the third ventricle but no shift of midline  structures.  2. Atrophy of the brain.  White matter changes consistent with small  vessel ischemic disease.     [Critical Result: Left thalamic parenchymal hemorrhage]    Finding was identified on 1/15/2017 7:34 AM.     Dr. aLzo was contacted by me on 1/15/2017 7:36 AM and verbalized  understanding of the critical result.     DAVID CRAWFORD MD   CT Head Neck Angio w/o & w Contrast    Narrative    CTA ANGIOGRAM HEAD NECK  1/15/2017 7:42 AM     HISTORY: Right-sided weakness and slurred speech. Parenchymal bleed.    TECHNIQUE:  Precontrast localizing scans were followed by CT  angiography with an injection of 70 mL Isovue-370 IV contrast with  scans through the head and neck.  Images were transferred to a  separate 3-D workstation where multiplanar reformations and 3-D images  were created.  Estimates of carotid stenoses are made relative to the  distal internal carotid artery  diameters except as noted. Radiation  dose for this scan was reduced using automated exposure control,  adjustment of the mA and/or kV according to patient size, or iterative  reconstruction technique.    COMPARISON: None.    FINDINGS: Estimates of stenosis at the carotid bifurcations are  relative to the distal internal carotids.    Right Carotid:  The right common carotid artery is normal.  Calcified  atherosclerotic plaque right carotid bifurcation. No significant  stenosis.  The right internal carotid artery is negative. Intracranial  images on the right are unremarkable.  No occluded vessels are seen.  A1 segment of the right anterior cerebral is hypoplastic    Left Carotid:  The left common carotid artery is unremarkable.   Calcified atherosclerotic plaque left carotid bifurcation. No  significant stenosis..  The left internal carotid is negative.      Intracranial images of the left are unremarkable.   No occluded  vessels are identified.    Vertebrobasilar:  The vertebral arteries are normal. The left  vertebral arises from the arch of the aorta which is a normal Right  vertebral artery is dominant.   The basilar artery and its branches  appear normal.      Impression    IMPRESSION:   1. No occluded vessels or high-grade internal intracranial vascular  stenosis.  2. Calcified plaque at the carotid bifurcations but no significant  stenosis.  3. No arterial dissection identified.  4. 2 cm parenchymal hemorrhage in the left thalamic region.    DAVID CRAWFORD MD   XR Chest Port 1 View    Narrative    CHEST PORTABLE ONE VIEW 1/15/2017 8:41 PM     COMPARISON: Frontal chest x-ray 12/15/2016.    HISTORY: Stroke, intracranial hemorrhage.    FINDINGS: The cardiac silhouette, pulmonary vasculature, lungs and  pleural spaces are within normal limits.      Impression    IMPRESSION: Clear lungs.    YOSI ROSALES MD   MR Brain w/o Contrast    Narrative    MRI BRAIN WITHOUT CONTRAST January 16, 2017 1:25 PM    HISTORY:  Intracranial hemorrhage.    TECHNIQUE:  Multiplanar, multisequence MRI of the brain without  gadolinium IV contrast material.      COMPARISON: CT dated 1/15/2017.    FINDINGS: Exam is slightly limited by motion artifact. The hematoma  has expanded slightly during the interval as there is slightly more  mass effect on the fourth ventricle. The hematoma consistent with  acute and subacute components and is better measured on the MR study  where it measures approximately 3.0 x 2.3 x 1.6 cm in size. Mild  cerebral atrophy is present. Patchy white matter changes are seen in  both hemispheres. There is no evidence for any acute ischemic infarct.      Impression    IMPRESSION:  1. Left thalamic hematoma with some increase in size since 1/15/2017  resulting in slightly more mass effect on the third ventricle.  2. Cerebral atrophy with chronic white matter changes.    ADRIAN DE LA TORRE MD   CT Head w/o Contrast    Narrative    CT HEAD WITHOUT CONTRAST   1/17/2017 9:25 AM     HISTORY: Re-evaluate ICH(intracerebral hemorrhage).    TECHNIQUE: Axial images of the head without IV contrast material.  Radiation dose for this scan was reduced using automated exposure  control, adjustment of the mA and/or kV according to patient size, or  iterative reconstruction technique.    COMPARISON: CT dated 1/15/2017, MR scan dated 1/16/2017.    FINDINGS: There is generalized atrophy of the brain. Areas of low  attenuation are present in the white matter of the cerebral  hemispheres that are consistent with small vessel ischemic disease in  this age patient. Again noted is the parenchymal hemorrhage in the  region of the left thalamus. This currently measures about 2.3 cm in  AP dimension x 1.9 cm in transverse dimension x 2.4 cm in  cephalocaudad dimension. It has increased in size slightly when  compared to 1/15/2017. The current volume is approximately 5.2 cubic  centimeters. On the prior CT the volume was calculated at 3 cubic  centimeters. The  visualized portions of the sinuses and mastoids  appear normal. There is no evidence of trauma.      Impression    IMPRESSION:   1. Slight increase in the size of the left thalamic hemorrhage since  the CT of 1/15/2017. No significant change since the MR scan of  1/16/2017. No significant or shift of midline structures.  2. Atrophy of the brain. White matter changes consistent with small  vessel ischemic disease.    DAVID CRAWFORD MD   XR Video Swallow w Esophagram    Narrative    VIDEO FLUOROSCOPIC SWALLOW STUDY WITH SPEECH THERAPY 1/20/2017 10:54  AM     HISTORY: Dysphagia.    FLUORO TIME: 2 minutes.    Video runs: 8    FINDINGS: The patient was observed swallowing a variety of  consistencies from solid to thin liquid. With thin consistency, there  was delayed swallow with premature pooling in the vallecula and  piriform sinuses. With thin consistency by cup, there was a single  instance of trace laryngeal penetration. No slade aspiration was  demonstrated. Mild residual was noted within the vallecula which the  patient readily cleared with additional swallowing.      Impression    IMPRESSION: Single instance of laryngeal penetration with thin liquid.  No slade aspiration. Delayed swallowing with premature pooling in the  vallecula and piriform sinuses. Otherwise unremarkable swallow study.    This study includes only the cervical esophagus.    CHRISTIANO DE LA PAZ MD           Most Recent Lab Results In EPIC (For Non-EPIC Providers):    Most Recent 3 CBC's:  Recent Labs   Lab Test  01/23/17   0710  01/22/17   0750  01/21/17   0830   WBC  6.0  5.0  4.6   HGB  10.2*  10.4*  9.9*   MCV  97  97  97   PLT  125*  143*  140*      Most Recent 3 BMP's:  Recent Labs   Lab Test  01/23/17   0710  01/22/17   0750  01/21/17   0830   NA  145*  147*  147*   POTASSIUM  4.0  3.8  3.9   CHLORIDE  113*  115*  115*   CO2  23 21  21   BUN  41*  35*  31*   CR  1.12*  0.99  1.02   ANIONGAP  9  11  11   ABELINO  8.5  8.8  8.7   GLC  104*  120*  112*      Most Recent 3 Troponin's:  Recent Labs   Lab Test  01/15/17   0800   TROPI  <0.015  The 99th percentile for upper reference range is 0.045 ug/L.  Troponin values in   the range of 0.045 - 0.120 ug/L may be associated with risks of adverse   clinical events.       Most Recent 3 INR's:  Recent Labs   Lab Test  01/15/17   0800  12/15/16   2232   INR  1.06  1.02     Most Recent 2 LFT's:No lab results found.  Most Recent Cholesterol Panel:No lab results found.  Most Recent 6 Bacteria Isolates From Any Culture (See EPIC Reports for Culture Details):  Recent Labs   Lab Test  01/15/17   2135  01/15/17   2130   CULT  No growth  No growth     Most Recent TSH, T4 and HgbA1c:No lab results found.                       Consult Notes      Consults by Eros Verdugo MD at 1/15/2017  8:34 AM     Author:  Eros Verdugo MD Service:  Neuro ICU Author Type:  Physician    Filed:  1/15/2017  8:43 AM Note Time:  1/15/2017  8:34 AM Status:  Signed    :  Eros Verdugo MD (Physician)             [facility]  [facility]    NeuroCritical Care Consultation Note     Michelle Lucio MRN# [id number]   YOB: 1931 Age: 85 year old    Code Status:Prior   Date of Admission: 1/15/2017  Date of Consult: 01/15/2017    _________________________________   Primary Care Physician  Graciela Jameson  ______________________________________________         Assessment and Plan  ______________________________________________  (I61.0) 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 DVT Prophylaxis  --Mechanical only  #. PT/OT/Speech  --Start  evaluations  #. Nutrition / GI Prophylaxis  --Per recommendations of speech therapy      #. Code Status: Full Code     TIME:   Neurocritical  care time:  60 minutes for evaluation and management of ICH. Patient has a very high risk of deterioration  ----------------------------------------------------------------------------------  ----------------------------------------------------------------------------------  Reason for consult: ICH    Chief Complaint  ______________________________________________  aphasia  History is obtained from the patient    History of Present Illness  ______________________________________________  85 year old female, with a history of TIA, HLD, HTN, and spinal stenosis, who presents via EMS with speech difficulty and right sided weakness. Per EMS, the staff at the patient's rehab facility report that the patient was acting normally last night when she went to bed around 2200. However, when they checked on her this morning, they found that she was confused and having great difficulty talking with inappropriate speech. The patient was also having difficulty smiling and had right arm weakness. Per EMS, glucose was 98 en route. On arrival, patient is alert. She denies any pain. Family has been alerted by her rehab facility that the patient was brought here. CT head in ER, demonstrated 2 cm left thalamic ICH without major MLS  [facility] was called for emergency management. On arrival, BP was in 180s. Nicardipine was started  On my evaluation, BP is still in 180s, nicardipine was increased to 7.5 mg/h. Patient remains awake, aphasic with right hemiplegia  Past Medical History   ______________________________________________  Past Medical History   Diagnosis Date     Neuropathy (H)      Sjoegren syndrome (H)      Arthritis      Spondylitis (H)      Hypertension      High cholesterol      Numbness and tingling      fingers & toes , > feet/toes     Vertigo      Spinal stenosis      TIA (transient ischemic attack)      Shortness of breath      per pt report no dx presently.     HLD (hyperlipidemia)      Renal disease      CKD 3     History  of blood transfusion      w/HYSTERECTOMY     Past Surgical History  ______________________________________________  Past Surgical History   Procedure Laterality Date     Ent surgery       Hernia repair       Gyn surgery       Eye surgery       Open reduction internal fixation hip nailing Left 12/16/2016     Procedure: OPEN REDUCTION INTERNAL FIXATION HIP NAILING;  Surgeon: Juan Linn MD;  Location:  OR     Prior to Admission Medications  ______________________________________________  Prior to Admission Medications   Prescriptions Last Dose Informant Patient Reported? Taking?   ACETAMINOPHEN PO   Yes No   Sig: Take 650 mg by mouth 3 times daily AND Give 2 tablet by mouth every 6 hours as needed for Pain   CYANOCOBALAMIN SL  Spouse/Significant Other Yes No   Sig: Place 500 mcg under the tongue daily   Multiple Vitamins-Minerals (CENTRUM SILVER) per tablet  Spouse/Significant Other Yes No   Sig: Take 1 tablet by mouth daily   allopurinol (ZYLOPRIM) 100 MG tablet  Spouse/Significant Other Yes No   Sig: Take 1 tablet (100 mg) by mouth daily   aspirin 81 MG tablet   No No   Sig: Take 1 tablet (81 mg) by mouth daily   calcium carbonate (OS-ABELINO 600 MG [place]. CA) 1500 (600 CA) MG tablet  Spouse/Significant Other Yes No   Sig: Take 1 tablet (600 mg) by mouth 2 times daily   folic acid (FOLVITE) 1 MG tablet   No No   Sig: Take 1 tablet (1 mg) by mouth daily   gabapentin (NEURONTIN) 300 MG capsule  Spouse/Significant Other Yes No   Sig: Take 300 mg by mouth At Bedtime    meclizine (ANTIVERT) 25 MG tablet  Spouse/Significant Other Yes No   Sig: Take 1 tablet (25 mg) by mouth every 6 hours as needed for dizziness   metoprolol (TOPROL-XL) 25 MG 24 hr tablet  Spouse/Significant Other Yes No   Sig: Take 1 tablet (25 mg) by mouth daily   omega 3 1000 MG CAPS  Spouse/Significant Other Yes No   Sig: Take 1 g by mouth daily   polyethylene glycol (MIRALAX/GLYCOLAX) powder   Yes No   Sig: Take 17 g by mouth daily   polyethylene  "glycol 0.4%- propylene glycol 0.3% (SYSTANE ULTRA) 0.4-0.3 % SOLN ophthalmic solution   Yes No   Sig: Place 1 drop into both eyes every hour as needed for dry eyes   predniSONE (DELTASONE) 5 MG tablet  Spouse/Significant Other Yes No   Sig: Take 5 mg by mouth daily    raloxifene (EVISTA) 60 MG tablet  Spouse/Significant Other Yes No   Sig: Take 1 tablet (60 mg) by mouth daily   sennosides (SENOKOT) 8.6 MG tablet   Yes No   Sig: Take 1 tablet by mouth 2 times daily   simvastatin (ZOCOR) 20 MG tablet  Spouse/Significant Other Yes No   Sig: Take 1 tablet (20 mg) by mouth At Bedtime   traMADol-acetaminophen (ULTRACET) 37.5-325 MG per tablet   No No   Sig: Take 1 tablet by mouth every 6 hours as needed for moderate pain      Facility-Administered Medications: None     Allergies  Allergies   Allergen Reactions     Colchicine      Diarrhea       Hydroxychloroquine      Pilocarpine      Sweating, chills.     Probenecid      Diarrhea       Tizanidine      Increased dizzines but prescribed for Pain management       Social History  ______________________________________________  Social History     Social History     Marital Status:      Spouse Name: N/A     Number of Children: N/A     Years of Education: N/A     Social History Main Topics     Smoking status: Former Smoker     Smokeless tobacco: None     Alcohol Use: 0.0 oz/week     0 Standard drinks or equivalent per week      Comment: Daily - 5-8 oz      Drug Use: No     Sexual Activity: No     Other Topics Concern     None     Social History Narrative       Family History  ______________________________________________  History reviewed. No pertinent family history.      Review of Systems  ______________________________________________  Review of systems is not obtainable due to patient factors - aphasia      Physical Exam  ______________________________________________  Weight:148 lbs 0 oz; Height:5' 5\"  Temp: 98.4  F (36.9  C) Temp src: Temporal BP: (!) 200/95 mmHg " (Simultaneous filing. User may not have seen previous data.)   Heart Rate: 86 Resp: 11 SpO2: 97 % O2 Device: None (Room air)    General Appearance:  No acute distress  Neuro:       Mental Status Exam:   Awake, alert, oriented X3. Speech slurred and language is minimal. Mental status is normal       Cranial Nerves:  Pupils 3 mm, reactive. EOMI. Face sensation is normal. Face right facial weakness. Tongue and uvula are midline. Other CN are normal           Motor:  Right hemiplegia. Tone and bulk are normal           Reflexes:  Normal DTR.Toes downgoing.        Sensory:  Right hypoesthesia              Coordination:   Intact finger-to-nose and toe-to-finger tests on the left       Gait: untestable.   Neck: no nuchal rigidity, normal thyroid. No carotid bruits.    Cardiovascular: Regular rate and rhythm, no m/r/g  Lungs: Clear to auscultation  Abdomen: Soft, not tender, not distended  Extremities: No clubbing, no cyanosis, no edema    Data  ______________________________________________  All Data personally reviewed:       Labs:   CBC RESULTS:     Recent Labs  Lab 01/15/17  0800   WBC 4.2   RBC 3.10*   HGB 9.8*   HCT 30.3*        Basic Metabolic Panel:   Recent Labs   Lab Test  01/15/17   0800 12/22/16 12/19/16   0610   NA  138  137  143   POTASSIUM  3.9  3.5  3.9   CHLORIDE  105  106  112*   CO2  24  24  22   BUN  26  23  23   CR  1.06*  0.99  0.93   GLC  85  107*  106*   ABELINO  9.0  8.6  7.9*     Liver panel:  No lab results found.  INR:  Recent Labs   Lab Test  01/15/17   0800  12/15/16   2232   INR  1.06  1.02      Lipid Profile:No lab results found.  Thyroid Panel:No lab results found.   Vitamin B12: No lab results found.   Vitamin D level: No lab results found.  A1C: No lab results found.  Troponin I: No lab results found.  Ammonia: No lab results found.  CK: No lab results found.     CRP inflammation: No lab results found.  ESR: No lab results found.    RIMA: No lab results found.    ANCA: No lab results  found.   Drug Screen: No lab results found.  Alcohol level:No lab results found.  UA Results:  Recent Labs   Lab Test  01/15/17   0811   10/27/16   1435   COLOR  Light Yellow   < >  Yellow   APPEARANCE  Clear   < >  Clear   URINEGLC  Negative   < >  Negative   URINEBILI  Negative   < >  Negative   URINEKETONE  Negative   < >  Negative   SG  1.016   < >  <=1.005   UBLD  Negative   < >  Trace*   URINEPH  6.5   < >  6.0   PROTEIN  10*   < >  Negative   UROBILINOGEN   --    --   0.2   NITRITE  Negative   < >  Negative   LEUKEST  Negative   < >  Negative   RBCU  <1   --   O - 2   WBCU  <1   --   O - 2    < > = values in this interval not displayed.     Most Recent 6 Bacteria Isolates From Any Culture (See EPIC Reports for Culture Details):No lab results found.     Cardiac US:   --       Neurophysiology:   --      Imaging:   All imaging studies were reviewed personally        Recent Results (from the past 24 hour(s))   CT Head w/o Contrast   Result Value    Radiologist flags Left thalamic parenchymal hemorrhage (AA)    Narrative    CT HEAD W/O CONTRAST   1/15/2017 7:32 AM     HISTORY: woke up with right sided weakness, slurred speech and  confusion    TECHNIQUE: Axial images of the head without IV contrast material.  Radiation dose for this scan was reduced using automated exposure  control, adjustment of the mA and/or kV according to patient size, or  iterative reconstruction technique.    COMPARISON: None.    FINDINGS:  There is generalized atrophy of the brain.  Areas of low  attenuation are present in the white matter of the cerebral  hemispheres that are consistent with small vessel ischemic disease in  this age patient. There is a parenchymal hemorrhage in the left  thalamic region. This measures approximately 2 cm in AP dimension by  1.5 cm in transverse dimension and 2 cm in cephalocaudad dimension.  The volume of this lesion is approximately 3 cc. There is slight mass  effect on the third ventricle but no shift  of midline structures. No  hydrocephalus The visualized portions of the sinuses and mastoids  appear normal. There is no evidence of trauma.      Impression    IMPRESSION:   1. Left thalamic parenchymal hemorrhage. Volume is approximately 3 cc.  Mild mass effect on the third ventricle but no shift of midline  structures.  2. Atrophy of the brain.  White matter changes consistent with small  vessel ischemic disease.     [Critical Result: Left thalamic parenchymal hemorrhage]    Finding was identified on 1/15/2017 7:34 AM.     Dr. Lazo was contacted by me on 1/15/2017 7:36 AM and verbalized  understanding of the critical result.    CT Head Neck Angio w/o & w Contrast    Narrative    CTA ANGIOGRAM HEAD NECK  1/15/2017 7:42 AM     HISTORY: Right-sided weakness and slurred speech. Parenchymal bleed.    TECHNIQUE:  Precontrast localizing scans were followed by CT  angiography with an injection of 70 mL Isovue-370 IV contrast with  scans through the head and neck.  Images were transferred to a  separate 3-D workstation where multiplanar reformations and 3-D images  were created.  Estimates of carotid stenoses are made relative to the  distal internal carotid artery diameters except as noted. Radiation  dose for this scan was reduced using automated exposure control,  adjustment of the mA and/or kV according to patient size, or iterative  reconstruction technique.    COMPARISON: None.    FINDINGS: Estimates of stenosis at the carotid bifurcations are  relative to the distal internal carotids.    Right Carotid:  The right common carotid artery is normal.  Calcified  atherosclerotic plaque right carotid bifurcation. No significant  stenosis.  The right internal carotid artery is negative. Intracranial  images on the right are unremarkable.  No occluded vessels are seen.  A1 segment of the right anterior cerebral is hypoplastic    Left Carotid:  The left common carotid artery is unremarkable.   Calcified atherosclerotic  plaque left carotid bifurcation. No  significant stenosis..  The left internal carotid is negative.      Intracranial images of the left are unremarkable.   No occluded  vessels are identified.    Vertebrobasilar:  The vertebral arteries are normal. The left  vertebral arises from the arch of the aorta which is a normal Right  vertebral artery is dominant.   The basilar artery and its branches  appear normal.      Impression    IMPRESSION:   1. No occluded vessels or high-grade internal intracranial vascular  stenosis.  2. Calcified plaque at the carotid bifurcations but no significant  stenosis.  3. No arterial dissection identified.  4. 2 cm parenchymal hemorrhage in the left thalamic region.    DAVID CRAWFORD MD          Intracerebral Hemorrhage:  Non-Traumatic Standard Measures for Stroke     ICH Score (within 6 hrs of admission/before surgery) done at: 0840 01/15/2017    ICH Score Tool Patients Score   Age ? 80 years 1 point 1   GCS score  3-4  5-12   13-15    2 point  1 point  0 point 0   Hematoma volume, cm3 ? 30 1 point 0   Intraventricular extension 1 point 0   Infratentorial location 1 point 0   Patient s ICH Score (0-6) = 1      Admission INR:   INR   Date Value Ref Range Status   01/15/2017 1.06 0.86 - 1.14 Final                               Progress Notes - Physician (Notes from 01/20/17 through 01/23/17)      Progress Notes by Brandi Lynn RN at 1/23/2017 12:59 PM     Author:  Brandi Lynn RN Service:  (none) Author Type:  Registered Nurse    Filed:  1/23/2017  1:01 PM Note Time:  1/23/2017 12:59 PM Status:  Signed    :  Brandi Lynn RN (Registered Nurse)           Pt up to chair with very strong 2, gait belt, and Yola Steady. Unable to provide any effort or bear weight on R side. Sliding forward in chair, leaning to R side even with pillows in place, and poor trunk control. RN deemed pt unsafe to stay up in chair for lunch, returned to bed with lift.       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   --- MRI of the brain on 1/16/17 showed slight increase of thalamic hemorrhage  --- Repeat CT today which showed slight increase of thalamic hemorrhage from the 1st CT and no change from the last MRI  --- PT/OT/SLP to follow  -- Diet per SLP  -- Video swallow study done and diet was resumed as per speech recommendation      Malignant HTN: Better control  --- Off Nicardipine drip  --- titrated up Hydralazin to 100 mg QID and will continue the same  --- Increased Lisinopril to 40 mg daily and continue the same        DVT: SCDs.      Disposition: Awaiting placement to TCU.       Sole Velez MD  [facility]ist  Board certified,   Internal Medicine  Pager # [phone]  1/23/2017      SUBJECTIVE: New overnight event: None.     OBJECTIVE:    /58 mmHg  Pulse 69  Temp(Src) 98.4  F (36.9  C) (Oral)  Resp 16  Ht 1.651 m (5' 5\")  Wt 66 kg (145 lb 8.1 oz)  BMI 24.21 kg/m2  SpO2 98%     GENERAL:  NAD.   HEENT:  Head is normocephalic and atraumatic. PERRL. EOMI. No scleral icterus. No conjunctival erythema. No nasal discharge.  Moist mucous membranes. Pharynx unremarkable.  NECK:  Supple. Non-tender. No LAD or masses.  No carotid bruits.  LUNGS:  Clear to auscultation bilaterally. No wheezing, rhonchi or rales.   HEART:  RRR. S1 S2. No murmurs.  ABD:  Soft, ND, NT, + BS. No masses or HSM.   SKIN:  No ulcers, rashes or lesions.   EXT:  No calf tenderness. Pulses " equal throughout.   MSK:  No deformities.  Neuro: Right facial drop. dysarthria        Labs:    Most Recent 3 CBC's:  Recent Labs   Lab Test  01/23/17   0710  01/22/17   0750  01/21/17   0830   WBC  6.0  5.0  4.6   HGB  10.2*  10.4*  9.9*   MCV  97  97  97   PLT  125*  143*  140*      Most Recent 3 BMP's:  Recent Labs   Lab Test  01/23/17   0710  01/22/17   0750  01/21/17   0830   NA  145*  147*  147*   POTASSIUM  4.0  3.8  3.9   CHLORIDE  113*  115*  115*   CO2  23 21 21   BUN  41*  35*  31*   CR  1.12*  0.99  1.02   ANIONGAP  9  11  11   ABELINO  8.5  8.8  8.7   GLC  104*  120*  112*     Most Recent 3 Troponin's:  Recent Labs   Lab Test  01/15/17   0800   TROPI  <0.015  The 99th percentile for upper reference range is 0.045 ug/L.  Troponin values in   the range of 0.045 - 0.120 ug/L may be associated with risks of adverse   clinical events.       Most Recent 3 INR's:  Recent Labs   Lab Test  01/15/17   0800  12/15/16   2232   INR  1.06  1.02     Most Recent 2 LFT's:No lab results found.  Most Recent Cholesterol Panel:No lab results found.  Most Recent 6 Bacteria Isolates From Any Culture (See EPIC Reports for Culture Details):  Recent Labs   Lab Test  01/15/17   2135  01/15/17   2130   CULT  No growth  No growth     Most Recent TSH, T4 and HgbA1c: No lab results found.    Prescriptions prior to admission   Medication Sig Dispense Refill Last Dose     Acetaminophen (TYLENOL PO) Take 650 mg by mouth every 6 hours as needed for mild pain or fever   prn     senna-docusate (SENOKOT-S;PERICOLACE) 8.6-50 MG per tablet Take 1 tablet by mouth 2 times daily   1/14/2017 at pm     TRAMADOL HCL PO Take 50 mg by mouth every 6 hours as needed for moderate to severe pain   prn     polyethylene glycol (MIRALAX/GLYCOLAX) powder Take 17 g by mouth daily as needed    prn     ACETAMINOPHEN PO Take 650 mg by mouth 3 times daily    1/14/2017 at pm     folic acid (FOLVITE) 1 MG tablet Take 1 tablet (1 mg) by mouth daily 30 tablet 0  1/14/2017 at am     aspirin 81 MG tablet Take 1 tablet (81 mg) by mouth daily 30 tablet  1/14/2017 at am     CYANOCOBALAMIN SL Place 500 mcg under the tongue daily   1/14/2017 at am     polyethylene glycol 0.4%- propylene glycol 0.3% (SYSTANE ULTRA) 0.4-0.3 % SOLN ophthalmic solution Place 1 drop into both eyes every hour as needed for dry eyes   prn     metoprolol (TOPROL-XL) 25 MG 24 hr tablet Take 1 tablet (25 mg) by mouth daily   1/14/2017 at am     simvastatin (ZOCOR) 20 MG tablet Take 1 tablet (20 mg) by mouth At Bedtime   1/14/2017 at hs     predniSONE (DELTASONE) 5 MG tablet Take 5 mg by mouth daily    1/14/2017 at am     gabapentin (NEURONTIN) 300 MG capsule Take 300 mg by mouth At Bedtime    1/14/2017 at hs     allopurinol (ZYLOPRIM) 100 MG tablet Take 1 tablet (100 mg) by mouth daily   1/14/2017 at am     meclizine (ANTIVERT) 25 MG tablet Take 1 tablet (25 mg) by mouth every 6 hours as needed for dizziness   prn     raloxifene (EVISTA) 60 MG tablet Take 1 tablet (60 mg) by mouth daily   1/14/2017 at am     calcium carbonate (OS-ABELINO 600 MG [place]. CA) 1500 (600 CA) MG tablet Take 1 tablet (600 mg) by mouth 2 times daily   1/14/2017 at am     Multiple Vitamins-Minerals (CENTRUM SILVER) per tablet Take 1 tablet by mouth daily   1/14/2017 at am     omega 3 1000 MG CAPS Take 1 g by mouth daily   1/14/2017 at am       Scheduled medications:    lisinopril  40 mg Oral Daily     hydrALAZINE  100 mg Oral 4x Daily     gabapentin  300 mg Oral At Bedtime     methylPREDNISolone  15 mg Intravenous Q24H       I/O last 3 completed shifts:  In: 840 [P.O.:840]  Out: -   Data  Data reviewed today:  I personally reviewed no images or EKG's today.    Recent Labs  Lab 01/23/17  0710 01/22/17  0750 01/21/17  0830   WBC 6.0 5.0 4.6   HGB 10.2* 10.4* 9.9*   MCV 97 97 97   * 143* 140*   * 147* 147*   POTASSIUM 4.0 3.8 3.9   CHLORIDE 113* 115* 115*   CO2 23 21 21   BUN 41* 35* 31*   CR 1.12* 0.99 1.02   ANIONGAP 9 11 11    ABELINO 8.5 8.8 8.7   * 120* 112*       No results found for this or any previous visit (from the past 24 hour(s)).                          Progress Notes by Anna Hinojosa at 1/23/2017 10:54 AM     Author:  Anna Hinojosa Service:  (none) Author Type:      Filed:  1/23/2017 11:05 AM Note Time:  1/23/2017 10:54 AM Status:  Signed    :  Anna Hinojosa ()           Social Work Progress Note  Pt chart reviewed. Pt discussed in interdisciplinary rounds.     Intervention: Sw met with pt's SO Caprice and she requested that sw sends a referral to [facility] (P: [phone]; F: [phone]), [facility] (P: [phone]; F: [phone]), and [facility]. Sw faxed referrals to the above facilities via Discharge on the Double. Chiki also explained to Caprice that [facility] hospital does not feel pt is appropriate for Acute Rehab at this time. Caprice shared that she feels the same, and is fine with pt discharging to a TCU instead.     Team members notified: CC    Plan:  Anticipated Discharge Placement: TCU  Barriers: None identified at this time.   Follow-up plan: Chiki to continue to follow.     JERRY Nguyen, SW  [phone]  1105       Progress Notes by Ileana Garcia LSW at 1/22/2017  4:04 PM     Author:  Ileana Garcia LSW Service:  Social Work Author Type:      Filed:  1/22/2017  4:12 PM Note Time:  1/22/2017  4:04 PM Status:  Signed    :  Ileana Garcia LSW ()           Care Transition Initial Assessment - CHIKI  Reason For Consult: discharge planning  Met with: Patient and Family    Active Problems:    ICH (intracerebral hemorrhage) (H)         DATA  Lives With: significant other  Living Arrangements: condominium  Description of Support System: Supportive, Involved  Who is your support system?: Partner    Identified issues/concerns regarding health management: Pt was at [facility] for rehab stay and due for discharge home in  "a couple of days when she suffered a stroke.  Patient feels that they have adequate support @ home?  No, not at this time. Pt and partner, Caprice, hoping that pt will be able to return home following rehab stay at ARU/TCU. Pt and Caprice are new to the area having recently moved from [place]. Caprice is open to having pt go to  ARU and ARU is assessing for possible admission on Monday (1/23/17)  Caprice discussed TCU facilities in the area and she is reviewing the list of TCU and will call SW with choices if needed. They would be agreeable to ARU as well as a return to [facility]. ARU liaison to re-assess Monday am and determine if pt would meet criteria for placement .        Transportation Available: family or friend will provide. Pt may possibly need transportation arranged at discharge through [facility].      ASSESSMENT  Cognitive Status:  awake and confused  Concerns to be addressed: Pt will need rehab placement preferably close to home.       PLAN  Patient Goals and Preferences:  ARU/TCU  Patient anticipates discharging to: [facility]         Progress Notes by Irma Gunter RD, LD at 1/22/2017  1:34 PM     Author:  Irma Gunter RD, LD Service:  Nutrition Author Type:  Registered Dietitian    Filed:  1/22/2017  1:55 PM Note Time:  1/22/2017  1:34 PM Status:  Signed    :  Iram Gunter RD, LD (Registered Dietitian)           CLINICAL NUTRITION SERVICES  -  ASSESSMENT NOTE    Recommendations Ordered by Registered Dietitian (CYNTHIA):   Magic cup oral supplement TID w/ meals   Malnutrition: Patient does not meet two of the criteria necessary for diagnosing malnutrition     REASON FOR ASSESSMENT  Michelle Lucio is a 85 year old female seen by Registered Dietitian for [facility]    NUTRITION HISTORY  - Information obtained from Caprice, pt's partner and EMR.   - Pt presented to ED from acute rehab facility where she was not eating very well \"I think all she ate there was shrimp salad\" She was not eating well at home prior to her previous " "admission (50-75% of normal).     CURRENT NUTRITION ORDERS  Diet Order:     Dysphagia Level II - since 1/20   Nectar thick liquids    Current Intake/Tolerance:  Pt eating ~50% of very small trays over admission. Per Caprice, pt ate two spoons of mashed potatoes yesterday at one meal, had eaten some noodles prior to visit, and was taking spoons of cran juice.     Pt requires help feeding.     PHYSICAL FINDINGS  Observed  No nutrition-related physical findings observed  Obtained from Chart/Interdisciplinary Team  None noted    ANTHROPOMETRICS  Height: 5' 5\"  Weight: 62.5 kg  Body mass index is 22.8 kg/(m^2).  Weight Status:  Normal BMI  IBW: 56.8 kg  % IBW: 110%  Weight History: no weight loss reported or indicated by weight history.   Wt Readings from Last 10 Encounters:   01/22/17 71 kg (156 lb 8.4 oz)   01/12/17 64.683 kg (142 lb 9.6 oz)   12/21/16 68.584 kg (151 lb 3.2 oz)   12/19/16 71.5 kg (157 lb 10.1 oz)   10/27/16 65.318 kg (144 lb)       LABS  Labs reviewed    MEDICATIONS  Medications reviewed    Dosing Weight 62.5 kg - wt from 1/15    ASSESSED NUTRITION NEEDS:  Estimated Energy Needs: 4704-7137 kcals (25-30 Kcal/Kg)  Justification: maintenance  Estimated Protein Needs:  grams protein (1.2-1.8 g pro/Kg)  Justification: preservation of lean body mass  Estimated Fluid Needs: 2621-0074 mL (1 mL/Kcal)  Justification: maintenance    MALNUTRITION:  % Weight Loss:  None noted  % Intake:  <75% for >/= 1 month (non-severe malnutrition)  Subcutaneous Fat Loss:  None observed  Muscle Loss:  None observed  Fluid Retention:  None noted    Malnutrition Diagnosis: Patient does not meet two of the above criteria necessary for diagnosing malnutrition    NUTRITION DIAGNOSIS:  Inadequate oral intake related to poor appetite and difficulty swallowing as evidenced by intakes of 50% of very small meals since diet advancement.     NUTRITION INTERVENTIONS  Recommendations / Nutrition Prescription  Diet per SLP  Nutritional " supplements with meals    Implementation  Nutrition education: Provided education on supplements, adequate protein  Medical Food Supplement: Magic Cup with meals    Nutrition Goals  Pt to consume at least 50% of meals TID.     MONITORING AND EVALUATION:  Progress towards goals will be monitored and evaluated per protocol and Practice Guidelines    Irma Gunter, RD, LD                 Progress Notes by Sole Velez MD at 1/22/2017 10:19 AM     Author:  Sole Velez MD Service:  Hospitalist Author Type:  Physician    Filed:  1/22/2017 10:32 AM Note Time:  1/22/2017 10:19 AM Status:  Addendum    :  Sole Velez MD (Physician)      Related Notes: Original Note by Sole Velez MD (Physician) filed at 1/22/2017 10:28 AM         [facility]    Hospitalist Progress note  Sole Velez MD    1/22/2017     ASSESSMENT AN PLAN:    Non traumatic subcortical hemorrhage of the  Left cerebral hemisphere:   ---- 2 cm left thalamic hemorrhage  --- Neurology consulted and followed.   --- MRI of the brain on 1/16/17 showed slight increase of thalamic hemorrhage  --- Repeat CT today which showed slight increase of thalamic hemorrhage from the 1st CT and no change from the last MRI  --- PT/OT/SLP to follow  -- Diet per SLP  -- Video swallow study done and diet was resumed as per speech recommendation      Malignant HTN: Only on metoprolol?   --- Off Nicardipine drip  --- titrated up Hydralazin to 100 mg QID  --- HR on the lower range and will Cardizem  --- Increase Lisinopril to 40 mg daily  ----Use  PRN Clonidine 0.1 mg QID for SBP >150 before using prn IV meds  --- Continue PRN Labetalol and hydralazin for SBP >150      DVT: SCDs.      Disposition: ARU once able to control SBP with po meds      Sole Velez MD  [facility]ist  Board certified,   Internal Medicine  Pager # [phone]  1/222017      SUBJECTIVE: New overnight event: None.  "    OBJECTIVE:    /80 mmHg  Pulse 69  Temp(Src) 98.2  F (36.8  C) (Oral)  Resp 16  Ht 1.651 m (5' 5\")  Wt 71 kg (156 lb 8.4 oz)  BMI 26.05 kg/m2  SpO2 98%     GENERAL:  NAD.   HEENT:  Head is normocephalic and atraumatic. PERRL. EOMI. No scleral icterus. No conjunctival erythema. No nasal discharge.  Moist mucous membranes. Pharynx unremarkable.  NECK:  Supple. Non-tender. No LAD or masses.  No carotid bruits.  LUNGS:  Clear to auscultation bilaterally. No wheezing, rhonchi or rales.   HEART:  RRR. S1 S2. No murmurs.  ABD:  Soft, ND, NT, + BS. No masses or HSM.   SKIN:  No ulcers, rashes or lesions.   EXT:  No calf tenderness. Pulses equal throughout.   MSK:  No deformities.  Neuro: Right facial drop. dysarthria        Labs:    Most Recent 3 CBC's:  Recent Labs   Lab Test  01/22/17   0750  01/21/17   0830  01/20/17   0820   WBC  5.0  4.6  4.4   HGB  10.4*  9.9*  9.3*   MCV  97  97  97   PLT  143*  140*  176      Most Recent 3 BMP's:  Recent Labs   Lab Test  01/22/17   0750  01/21/17   0830  01/20/17   0820   NA  147*  147*  147*   POTASSIUM  3.8  3.9  3.4   CHLORIDE  115*  115*  116*   CO2  21  21  20   BUN  35*  31*  28   CR  0.99  1.02  0.96   ANIONGAP  11  11  11   ABELINO  8.8  8.7  8.5   GLC  120*  112*  104*     Most Recent 3 Troponin's:  Recent Labs   Lab Test  01/15/17   0800   TROPI  <0.015  The 99th percentile for upper reference range is 0.045 ug/L.  Troponin values in   the range of 0.045 - 0.120 ug/L may be associated with risks of adverse   clinical events.       Most Recent 3 INR's:  Recent Labs   Lab Test  01/15/17   0800  12/15/16   2232   INR  1.06  1.02     Most Recent 2 LFT's:No lab results found.  Most Recent Cholesterol Panel:No lab results found.  Most Recent 6 Bacteria Isolates From Any Culture (See EPIC Reports for Culture Details):  Recent Labs   Lab Test  01/15/17   2135  01/15/17   2130   CULT  No growth  No growth     Most Recent TSH, T4 and HgbA1c: No lab results " found.    Prescriptions prior to admission   Medication Sig Dispense Refill Last Dose     Acetaminophen (TYLENOL PO) Take 650 mg by mouth every 6 hours as needed for mild pain or fever   prn     senna-docusate (SENOKOT-S;PERICOLACE) 8.6-50 MG per tablet Take 1 tablet by mouth 2 times daily   1/14/2017 at pm     TRAMADOL HCL PO Take 50 mg by mouth every 6 hours as needed for moderate to severe pain   prn     polyethylene glycol (MIRALAX/GLYCOLAX) powder Take 17 g by mouth daily as needed    prn     ACETAMINOPHEN PO Take 650 mg by mouth 3 times daily    1/14/2017 at pm     folic acid (FOLVITE) 1 MG tablet Take 1 tablet (1 mg) by mouth daily 30 tablet 0 1/14/2017 at am     aspirin 81 MG tablet Take 1 tablet (81 mg) by mouth daily 30 tablet  1/14/2017 at am     CYANOCOBALAMIN SL Place 500 mcg under the tongue daily   1/14/2017 at am     polyethylene glycol 0.4%- propylene glycol 0.3% (SYSTANE ULTRA) 0.4-0.3 % SOLN ophthalmic solution Place 1 drop into both eyes every hour as needed for dry eyes   prn     metoprolol (TOPROL-XL) 25 MG 24 hr tablet Take 1 tablet (25 mg) by mouth daily   1/14/2017 at am     simvastatin (ZOCOR) 20 MG tablet Take 1 tablet (20 mg) by mouth At Bedtime   1/14/2017 at hs     predniSONE (DELTASONE) 5 MG tablet Take 5 mg by mouth daily    1/14/2017 at am     gabapentin (NEURONTIN) 300 MG capsule Take 300 mg by mouth At Bedtime    1/14/2017 at hs     allopurinol (ZYLOPRIM) 100 MG tablet Take 1 tablet (100 mg) by mouth daily   1/14/2017 at am     meclizine (ANTIVERT) 25 MG tablet Take 1 tablet (25 mg) by mouth every 6 hours as needed for dizziness   prn     raloxifene (EVISTA) 60 MG tablet Take 1 tablet (60 mg) by mouth daily   1/14/2017 at am     calcium carbonate (OS-ABELINO 600 MG [place]. CA) 1500 (600 CA) MG tablet Take 1 tablet (600 mg) by mouth 2 times daily   1/14/2017 at am     Multiple Vitamins-Minerals (CENTRUM SILVER) per tablet Take 1 tablet by mouth daily   1/14/2017 at am     omega 3 1000 MG  CAPS Take 1 g by mouth daily   1/14/2017 at am       Scheduled medications:    [START ON 1/23/2017] lisinopril  40 mg Oral Daily     hydrALAZINE  100 mg Oral 4x Daily     gabapentin  300 mg Oral At Bedtime     methylPREDNISolone  15 mg Intravenous Q24H          Data  Data reviewed today:  I personally reviewed no images or EKG's today.    Recent Labs  Lab 01/22/17  0750 01/21/17  0830 01/20/17  0820   WBC 5.0 4.6 4.4   HGB 10.4* 9.9* 9.3*   MCV 97 97 97   * 140* 176   * 147* 147*   POTASSIUM 3.8 3.9 3.4   CHLORIDE 115* 115* 116*   CO2 21 21 20   BUN 35* 31* 28   CR 0.99 1.02 0.96   ANIONGAP 11 11 11   ABELINO 8.8 8.7 8.5   * 112* 104*       No results found for this or any previous visit (from the past 24 hour(s)).                          Progress Notes by Nicolasa Kruger RN at 1/22/2017 10:29 AM     Author:  Nicolasa Kruger RN Service:  (none) Author Type:  Registered Nurse    Filed:  1/22/2017 10:29 AM Note Time:  1/22/2017 10:29 AM Status:  Signed    :  Nicolasa Kruger RN (Registered Nurse)           Text to Dr Velez to clarify BP parameters in orders.       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 Input appreciated.  --- MRI of the brain on 1/16/17 showed slight increase of thalamic hemorrhage  --- Repeat CT today which showed slight increase of thalamic hemorrhage from the 1st CT and no change from the last MRI  --- PT/OT/SLP to  "follow  -- Diet per SLP  -- Video swallow study today      Malignant HTN: Only on metoprolol?   --- Off Nicardipine drip  --- titrated up Hydralazin dose to 100 mg QID  --- Cardizem 30 mg QID was added and discontinue metoprolol.  --- Increase Lisinopril to 20 mg daily and give one dose of 10 mg now  ----Use  PRN Clonidine 0.1 mg QID for SBP >140 before using prn IV meds  --- Continue  PRN Labetalol and hydralazin for SBP >140      DVT: SCDs.      Disposition: ARU once able to control SBP with po meds      Sole Velez MD  [facility] Hospitalist  Board certified,   Internal Medicine  Pager # [phone]  1/21/2017      SUBJECTIVE: New overnight event: None.     OBJECTIVE:    /82 mmHg  Pulse 69  Temp(Src) 98  F (36.7  C) (Oral)  Resp 16  Ht 1.651 m (5' 5\")  Wt 55.8 kg (123 lb 0.3 oz)  BMI 20.47 kg/m2  SpO2 96%     GENERAL:  NAD.   HEENT:  Head is normocephalic and atraumatic. PERRL. EOMI. No scleral icterus. No conjunctival erythema. No nasal discharge.  Moist mucous membranes. Pharynx unremarkable.  NECK:  Supple. Non-tender. No LAD or masses.  No carotid bruits.  LUNGS:  Clear to auscultation bilaterally. No wheezing, rhonchi or rales.   HEART:  RRR. S1 S2. No murmurs.  ABD:  Soft, ND, NT, + BS. No masses or HSM.   SKIN:  No ulcers, rashes or lesions.   EXT:  No calf tenderness. Pulses equal throughout.   MSK:  No deformities.  Neuro: Right facial drop. dysarthria        Labs:    Most Recent 3 CBC's:  Recent Labs   Lab Test  01/21/17   0830  01/20/17   0820  01/19/17   0733   WBC  4.6  4.4  6.9   HGB  9.9*  9.3*  9.6*   MCV  97  97  97   PLT  140*  176  209      Most Recent 3 BMP's:  Recent Labs   Lab Test  01/21/17   0830  01/20/17   0820  01/19/17   0733   NA  147*  147*  148*   POTASSIUM  3.9  3.4  3.4   CHLORIDE  115*  116*  117*   CO2  21  20  20   BUN  31*  28  24   CR  1.02  0.96  0.85   ANIONGAP  11  11  11   ABELINO  8.7  8.5  8.5   GLC  112*  104*  126*     Most Recent 3 Troponin's:  Recent " Labs   Lab Test  01/15/17   0800   TROPI  <0.015  The 99th percentile for upper reference range is 0.045 ug/L.  Troponin values in   the range of 0.045 - 0.120 ug/L may be associated with risks of adverse   clinical events.       Most Recent 3 INR's:  Recent Labs   Lab Test  01/15/17   0800  12/15/16   2232   INR  1.06  1.02     Most Recent 2 LFT's:No lab results found.  Most Recent Cholesterol Panel:No lab results found.  Most Recent 6 Bacteria Isolates From Any Culture (See EPIC Reports for Culture Details):  Recent Labs   Lab Test  01/15/17   2135  01/15/17   2130   CULT  No growth after 5 days  No growth after 5 days     Most Recent TSH, T4 and HgbA1c: No lab results found.    Prescriptions prior to admission   Medication Sig Dispense Refill Last Dose     Acetaminophen (TYLENOL PO) Take 650 mg by mouth every 6 hours as needed for mild pain or fever   prn     senna-docusate (SENOKOT-S;PERICOLACE) 8.6-50 MG per tablet Take 1 tablet by mouth 2 times daily   1/14/2017 at pm     TRAMADOL HCL PO Take 50 mg by mouth every 6 hours as needed for moderate to severe pain   prn     polyethylene glycol (MIRALAX/GLYCOLAX) powder Take 17 g by mouth daily as needed    prn     ACETAMINOPHEN PO Take 650 mg by mouth 3 times daily    1/14/2017 at pm     folic acid (FOLVITE) 1 MG tablet Take 1 tablet (1 mg) by mouth daily 30 tablet 0 1/14/2017 at am     aspirin 81 MG tablet Take 1 tablet (81 mg) by mouth daily 30 tablet  1/14/2017 at am     CYANOCOBALAMIN SL Place 500 mcg under the tongue daily   1/14/2017 at am     polyethylene glycol 0.4%- propylene glycol 0.3% (SYSTANE ULTRA) 0.4-0.3 % SOLN ophthalmic solution Place 1 drop into both eyes every hour as needed for dry eyes   prn     metoprolol (TOPROL-XL) 25 MG 24 hr tablet Take 1 tablet (25 mg) by mouth daily   1/14/2017 at am     simvastatin (ZOCOR) 20 MG tablet Take 1 tablet (20 mg) by mouth At Bedtime   1/14/2017 at hs     predniSONE (DELTASONE) 5 MG tablet Take 5 mg by mouth  daily    1/14/2017 at am     gabapentin (NEURONTIN) 300 MG capsule Take 300 mg by mouth At Bedtime    1/14/2017 at hs     allopurinol (ZYLOPRIM) 100 MG tablet Take 1 tablet (100 mg) by mouth daily   1/14/2017 at am     meclizine (ANTIVERT) 25 MG tablet Take 1 tablet (25 mg) by mouth every 6 hours as needed for dizziness   prn     raloxifene (EVISTA) 60 MG tablet Take 1 tablet (60 mg) by mouth daily   1/14/2017 at am     calcium carbonate (OS-ABELINO 600 MG [place]. CA) 1500 (600 CA) MG tablet Take 1 tablet (600 mg) by mouth 2 times daily   1/14/2017 at am     Multiple Vitamins-Minerals (CENTRUM SILVER) per tablet Take 1 tablet by mouth daily   1/14/2017 at am     omega 3 1000 MG CAPS Take 1 g by mouth daily   1/14/2017 at am       Scheduled medications:    diltiazem  60 mg Oral Q8H JOHN     [START ON 1/22/2017] lisinopril  20 mg Oral Daily     lisinopril  10 mg Oral Once     hydrALAZINE  100 mg Oral 4x Daily     gabapentin  300 mg Oral At Bedtime     methylPREDNISolone  15 mg Intravenous Q24H       I/O last 3 completed shifts:  In: 250 [P.O.:250]  Out: -   Data  Data reviewed today:  I personally reviewed no images or EKG's today.    Recent Labs  Lab 01/21/17  0830 01/20/17  0820 01/19/17  0733  01/15/17  0800   WBC 4.6 4.4 6.9  < > 4.2   HGB 9.9* 9.3* 9.6*  < > 9.8*   MCV 97 97 97  < > 98   * 176 209  < > 225   INR  --   --   --   --  1.06   * 147* 148*  < > 138   POTASSIUM 3.9 3.4 3.4  < > 3.9   CHLORIDE 115* 116* 117*  < > 105   CO2 21 20 20  < > 24   BUN 31* 28 24  < > 26   CR 1.02 0.96 0.85  < > 1.06*   ANIONGAP 11 11 11  < > 9   ABELINO 8.7 8.5 8.5  < > 9.0   * 104* 126*  < > 85   TROPI  --   --   --   --  <0.015The 99th percentile for upper reference range is 0.045 ug/L.  Troponin values in the range of 0.045 - 0.120 ug/L may be associated with risks of adverse clinical events.   < > = values in this interval not displayed.    Recent Results (from the past 24 hour(s))   XR Video Swallow w Esophagram     Narrative    VIDEO FLUOROSCOPIC SWALLOW STUDY WITH SPEECH THERAPY 1/20/2017 10:54  AM     HISTORY: Dysphagia.    FLUORO TIME: 2 minutes.    Video runs: 8    FINDINGS: The patient was observed swallowing a variety of  consistencies from solid to thin liquid. With thin consistency, there  was delayed swallow with premature pooling in the vallecula and  piriform sinuses. With thin consistency by cup, there was a single  instance of trace laryngeal penetration. No slade aspiration was  demonstrated. Mild residual was noted within the vallecula which the  patient readily cleared with additional swallowing.      Impression    IMPRESSION: Single instance of laryngeal penetration with thin liquid.  No slade aspiration. Delayed swallowing with premature pooling in the  vallecula and piriform sinuses. Otherwise unremarkable swallow study.    This study includes only the cervical esophagus.    CHRISTIANO DE LA PAZ MD                             Progress Notes by Kaylin Mena SLP at 1/20/2017 11:15 AM     Author:  Kaylin Mena SLP Service:  (none) Author Type:  Speech Language Pathologist    Filed:  1/20/2017  3:49 PM Note Time:  1/20/2017 11:15 AM Status:  Addendum    :  Kaylin Mena SLP (Speech Language Pathologist)      Related Notes: Original Note by Kaylin Mena SLP (Speech Language Pathologist) filed at 1/20/2017  3:45 PM            01/20/17 1100   General Information   Onset Date 01/15/17   Start of Care Date 01/15/17   Referring Physician Dr. Kinsey   Patient Profile Review/OT: Additional Occupational Profile Info See Profile for full history and prior level of function   Patient/Family Goals Statement None stated as pt with expressive and receptive aphasia.    Swallowing Evaluation Videofluoroscopic evaluation   Behaviorial Observations Alert   Mode of current nutrition NPO   Comments Pt admitted with intracranial hemorrhage, left thalamic parenchymal hemorrhage. Clinical swallow evaluation  completed 1/15/17 with subsequent daily treatment to assess for PO readines. VFSS completed to further assess oral and pharyngeal swallowing function and to rule out silent aspiration.    VFSS Evaluation   VFSS Additional Documentation Yes   VFSS Eval: Radiology   Radiologist CHRISTIANO DE LA PAZ MD   Views Taken left lateral   Physical Location of Procedure [facility]   VFSS Eval: Thin Liquid Texture Trial   Mode of Presentation, Thin Liquid spoon;cup   Order of Presentation 1, 2, 3, 6, 8   Preparatory Phase WFL   Oral Phase, Thin Liquid Premature pharyngeal entry  (to pyriform sinuses)   Pharyngeal Phase, Thin Liquid Delayed swallow reflex;Residue in valleculae   Rosenbek's Penetration Aspiration Scale: Thin Liquid Trial Results 2 - contrast enters airway, remains above the vocal cords, no residue remains (penetration)   Successful Strategies Trialed During Procedure, Thin Liquid other (see comments)  (subsequent dry swallow)   Diagnostic Statement Poor bolus control with delayed swallow response. Effective airway protection with single epsidoe of flash penetration. Valleuclar residue clears when cued to use subsequent dry swallow.    VFSS Eval: Nectar Thick Liquid Texture Trial   Mode of Presentation, Nectar cup   Order of Presentation 4   Preparatory Phase WFL   Oral Phase, Nectar Premature pharyngeal entry  (to valleculae)   Pharyngeal Phase, Nectar Delayed swallow reflex;Residue in valleculae   Rosenbek's Penetration Aspiration Scale: Nectar-Thick Liquid Trial Results 1 - no aspiration, contrast does not enter airway   Diagnostic Statement Improved bolus control and timing of swallow response compared to thin liquids. No penetration or aspiration.    VFSS Eval: Puree Solid Texture Trial   Mode of Presentation, Puree spoon   Order of Presentation 5   Preparatory Phase WFL   Oral Phase, Puree Premature pharyngeal entry;other (see comments)  (mildly reduced bolus formation)   Pharyngeal Phase, Puree Delayed swallow  reflex  (mild)   Rosenbek's Penetration Aspiration Scale: Puree Food Trial Results 1 - no aspiration, contrast does not enter airway   Diagnostic Statement Mildly delayed swallow response with effective airway protection. No oral or pharyngeal residue.    VFSS Eval: Solid Food Texture Trial   Mode of Presentation, Solid self-fed   Order of Presentation 7   Preparatory Phase Insufficient mastication;Poor bolus control  (slow mastication)   Oral Phase, Solid Residue in oral cavity;other (see comments)  (reduced bolus formation)   Pharyngeal Phase, Solid WFL   Rosenbek's Penetration Aspiration Scale: Solid Food Trial Results 1 - no aspiration, contrast does not enter airway   Successful Strategies Trialed During Procedure, Solid other (see comments)  (oral residue clears with subsequent dry swallow)   Diagnostic Statement Slow mastication with reduced bolus formation and mild oral residue. Prompt swallow response with effective airway protection.    Swallow Compensations   Swallow Compensations Alternate viscosity of consistencies;Multiple swallow   Results No difficulties noted   Esophageal Phase of Swallow   Patient reports or presents with symptoms of esophageal dysphagia No   General Therapy Interventions   Planned Therapy Interventions Dysphagia Treatment   Dysphagia treatment Oropharyngeal exercise training;Modified diet education;Instruction of safe swallow strategies;Compensatory strategies for swallowing   Swallow Eval: Clinical Impressions   Skilled Criteria for Therapy Intervention Skilled criteria met.  Treatment indicated.   Dysphagia Outcome Severity Scale (JORGE) Level 4 - JORGE   Treatment Diagnosis Mild-moderate oropharyngela dysphagia   Diet texture recommendations Dysphagia diet level 2;Nectar thick liquids   Recommended Feeding/Eating Techniques alternate between small bites and sips of food/liquid;small sips/bites  (double swallow )   Therapy Frequency daily   Predicted Duration of Therapy  Intervention (days/wks) 1 week   Anticipated Discharge Disposition inpatient rehabilitation facility   Risks and Benefits of Treatment have been explained. Yes   Patient, family and/or staff in agreement with Plan of Care Yes   Clinical Impression Comments Video swallow study completed this AM. Pt demonstrates mild-moderate oral and pharyngeal dysphagia. She exhibits slow mastication of solid textures, poor bolus formation with solid textures, and premature spillage over the base of tongue with all liquid consistencies. Thin liquids noted to spill to pyriform sinuses whereas nectar-thick liquids only spill to valleculae. Swallow response consistently delayed to level valleculae. Occasional flash penetration noted with thin liquids; no penetration with nectar-thick liquids. No aspiration seen on today's exam. Mild oral residue with solid textures; mild-mod vallecular residue with thin and nectar-thick liquids. Pt benefits from verbal cue to complete a subsequent dry swallow which clears oral and pharyngeal residue. Given results of today's exam, pt is at increased risk of aspiration with thin liquids. Recommend initiate dysphagia diet level 2 and nectar-thick liquids by cup. Swallowing precautions include supervision with verbal cues for use of strategies, sit up at 90 degrees, small bites/sips, alternate solids/liquids, swallow 2x per bite/sip, slow pace, regular oral cares. Recommend meds given with teaspoon of nectar-thick liquids or puree. SLP will continue to follow to assess tolerance of diet/appropriateness for diet upgrade.    Total Evaluation Time   Total Evaluation Time (Minutes) 15          Progress Notes by Kamlesh Ramos CM at 1/20/2017  3:43 PM     Author:  Kamlesh Ramos CM Service:  Acute IP Rehab Author Type:      Filed:  1/20/2017  3:47 PM Note Time:  1/20/2017  3:43 PM Status:  Signed    :  Kamlesh Ramos CM ()           ARC admissions: Care coordinator and SW  request liaison meet with patient to determine if she meets criteria. Met with patient and spoke with patient's partner, Caprice, via the phone. Caprice reports that she is available 24/7 to supervise the patient and she is able to perform minimal lifting if needed. They live in a condo without stairs. Caprice reports prior to admit to the hospital this time, the patient was nearly ready to DC home from TCU after fractured hip and was able to walk independently with FWW, using WC for longer distances. Pt and partner state they are open to ARC. Educated Caprice on criteria, location of unit, and goals of return to community living following rehabilitation. Caprice states they are motivated to return home following rehab, but open to alternate plans if needed.     Thank you for the referral, we will continue to follow this patient for post acute placement.     Determination of admission is based upon the patient's need for an intensive, interdisciplinary approach to rehabilitation, their ability to progress, their ability to tolerate intensive therapies, their need for daily physician supervision, their need for twenty four hour nursing assistance, and their ability and willingness to participate in such a program.    Kamlesh Ramos CM  Rehab Liaison/  [facility] and [facility]itional [facility]  1/20/2017    3:47 PM         Progress Notes by Sole Velez MD at 1/20/2017  8:31 AM     Author:  Sole Velez MD Service:  Hospitalist Author Type:  Physician    Filed:  1/20/2017 10:47 AM Note Time:  1/20/2017  8:31 AM Status:  Addendum    :  Sole Velez MD (Physician)      Related Notes: Original Note by Sole Velez MD (Physician) filed at 1/20/2017  8:35 AM         [facility]    Hospitalist Progress note  Sole Velez MD    1/120/2017     ASSESSMENT AN PLAN:    Non traumatic subcortical hemorrhage of the  Left  "cerebral hemisphere: Admitted to ICU.   ---- 2 cm left thalamic hemorrhage  --- Neurology consulted and followed. Input appreciated.  --- MRI of the brain on 1/16/17 showed slight increase of thalamic hemorrhage  --- Repeat CT today which showed slight increase of thalamic hemorrhage from the 1st CT and no change from the last MRI  --- PT/OT/SLP to follow  -- Diet per SLP  -- Video swallow study today      Malignant HTN: Only on metoprolol?   --- Off Nicardipine drip  --- titrated up Hydralazin dose to 100 mg QID  --- Increase Lisinopril to 10 mg daily.  ----Use  PRN Clonidine 0.1 mg QID for SBP >140 before using prn IV meds  --- Continue  PRN Labetalol and hydralazin for SBP >140  --- Resumed PTA metoprolol      DVT: SCDs.      Disposition: ARU once able to control SBP with po meds      Sole Velez MD  [facility]  Board certified,   Internal Medicine  Pager # [phone]  1/20/2017      SUBJECTIVE: New overnight event: None.     OBJECTIVE:    /82 mmHg  Temp(Src) 97.7  F (36.5  C) (Oral)  Resp 16  Ht 1.651 m (5' 5\")  Wt 55.8 kg (123 lb 0.3 oz)  BMI 20.47 kg/m2  SpO2 95%     GENERAL:  NAD.   HEENT:  Head is normocephalic and atraumatic. PERRL. EOMI. No scleral icterus. No conjunctival erythema. No nasal discharge.  Moist mucous membranes. Pharynx unremarkable.  NECK:  Supple. Non-tender. No LAD or masses.  No carotid bruits.  LUNGS:  Clear to auscultation bilaterally. No wheezing, rhonchi or rales.   HEART:  RRR. S1 S2. No murmurs.  ABD:  Soft, ND, NT, + BS. No masses or HSM.   SKIN:  No ulcers, rashes or lesions.   EXT:  No calf tenderness. Pulses equal throughout.   MSK:  No deformities.  Neuro: Right facial drop. dysarthria        Labs:    Most Recent 3 CBC's:  Recent Labs   Lab Test  01/20/17   0820  01/19/17   0733  01/18/17   0420   WBC  4.4  6.9  8.0   HGB  9.3*  9.6*  9.4*   MCV  97  97  97   PLT  176  209  204      Most Recent 3 BMP's:  Recent Labs   Lab Test  01/19/17   0733  " 01/18/17   0420  01/17/17   0428   NA  148*  146*  144   POTASSIUM  3.4  3.9  4.0   CHLORIDE  117*  115*  113*   CO2  20  21  22   BUN  24  18  20   CR  0.85  0.81  0.97   ANIONGAP  11  10  9   ABELINO  8.5  7.8*  8.0*   GLC  126*  142*  163*     Most Recent 3 Troponin's:  Recent Labs   Lab Test  01/15/17   0800   TROPI  <0.015  The 99th percentile for upper reference range is 0.045 ug/L.  Troponin values in   the range of 0.045 - 0.120 ug/L may be associated with risks of adverse   clinical events.       Most Recent 3 INR's:  Recent Labs   Lab Test  01/15/17   0800  12/15/16   2232   INR  1.06  1.02     Most Recent 2 LFT's:No lab results found.  Most Recent Cholesterol Panel:No lab results found.  Most Recent 6 Bacteria Isolates From Any Culture (See EPIC Reports for Culture Details):  Recent Labs   Lab Test  01/15/17   2135  01/15/17   2130   CULT  No growth after 4 days  No growth after 4 days     Most Recent TSH, T4 and HgbA1c: No lab results found.    Prescriptions prior to admission   Medication Sig Dispense Refill Last Dose     Acetaminophen (TYLENOL PO) Take 650 mg by mouth every 6 hours as needed for mild pain or fever   prn     senna-docusate (SENOKOT-S;PERICOLACE) 8.6-50 MG per tablet Take 1 tablet by mouth 2 times daily   1/14/2017 at pm     TRAMADOL HCL PO Take 50 mg by mouth every 6 hours as needed for moderate to severe pain   prn     polyethylene glycol (MIRALAX/GLYCOLAX) powder Take 17 g by mouth daily as needed    prn     ACETAMINOPHEN PO Take 650 mg by mouth 3 times daily    1/14/2017 at pm     folic acid (FOLVITE) 1 MG tablet Take 1 tablet (1 mg) by mouth daily 30 tablet 0 1/14/2017 at am     aspirin 81 MG tablet Take 1 tablet (81 mg) by mouth daily 30 tablet  1/14/2017 at am     CYANOCOBALAMIN SL Place 500 mcg under the tongue daily   1/14/2017 at am     polyethylene glycol 0.4%- propylene glycol 0.3% (SYSTANE ULTRA) 0.4-0.3 % SOLN ophthalmic solution Place 1 drop into both eyes every hour as needed  for dry eyes   prn     metoprolol (TOPROL-XL) 25 MG 24 hr tablet Take 1 tablet (25 mg) by mouth daily   1/14/2017 at am     simvastatin (ZOCOR) 20 MG tablet Take 1 tablet (20 mg) by mouth At Bedtime   1/14/2017 at hs     predniSONE (DELTASONE) 5 MG tablet Take 5 mg by mouth daily    1/14/2017 at am     gabapentin (NEURONTIN) 300 MG capsule Take 300 mg by mouth At Bedtime    1/14/2017 at hs     allopurinol (ZYLOPRIM) 100 MG tablet Take 1 tablet (100 mg) by mouth daily   1/14/2017 at am     meclizine (ANTIVERT) 25 MG tablet Take 1 tablet (25 mg) by mouth every 6 hours as needed for dizziness   prn     raloxifene (EVISTA) 60 MG tablet Take 1 tablet (60 mg) by mouth daily   1/14/2017 at am     calcium carbonate (OS-ABELINO 600 MG [place]. CA) 1500 (600 CA) MG tablet Take 1 tablet (600 mg) by mouth 2 times daily   1/14/2017 at am     Multiple Vitamins-Minerals (CENTRUM SILVER) per tablet Take 1 tablet by mouth daily   1/14/2017 at am     omega 3 1000 MG CAPS Take 1 g by mouth daily   1/14/2017 at am       Scheduled medications:    cloNIDine  0.1 mg Oral TID     hydrALAZINE  100 mg Oral 4x Daily     lisinopril  10 mg Oral Daily     gabapentin  300 mg Oral At Bedtime     metoprolol  25 mg Oral BID     methylPREDNISolone  15 mg Intravenous Q24H       I/O last 3 completed shifts:  In: 240 [P.O.:240]  Out: 480 [Urine:480]  Data  Data reviewed today:  I personally reviewed no images or EKG's today.    Recent Labs  Lab 01/20/17  0820 01/19/17  0733 01/18/17  0420 01/17/17  0428  01/15/17  0800   WBC 4.4 6.9 8.0 6.8  < > 4.2   HGB 9.3* 9.6* 9.4* 9.2*  < > 9.8*   MCV 97 97 97 97  < > 98    209 204 207  < > 225   INR  --   --   --   --   --  1.06   NA  --  148* 146* 144  < > 138   POTASSIUM  --  3.4 3.9 4.0  < > 3.9   CHLORIDE  --  117* 115* 113*  < > 105   CO2  --  20 21 22  < > 24   BUN  --  24 18 20  < > 26   CR  --  0.85 0.81 0.97  < > 1.06*   ANIONGAP  --  11 10 9  < > 9   ABELINO  --  8.5 7.8* 8.0*  < > 9.0   GLC  --  126* 142*  163*  < > 85   TROPI  --   --   --   --   --  <0.015The 99th percentile for upper reference range is 0.045 ug/L.  Troponin values in the range of 0.045 - 0.120 ug/L may be associated with risks of adverse clinical events.   < > = values in this interval not displayed.    No results found for this or any previous visit (from the past 24 hour(s)).                          Progress Notes by Eros Verdugo MD at 1/19/2017  9:41 AM     Author:  Eros Verdugo MD Service:  Neurology Author Type:  Physician    Filed:  1/20/2017 10:13 AM Note Time:  1/19/2017  9:41 AM Status:  Addendum    :  Eros Verdugo MD (Physician)      Related Notes: Original Note by Nicky Garcia APRN CNP (Nurse Practitioner) filed at 1/19/2017 10:36 AM         [facility]  [facility]  Neurology Daily Note      Admission Date:1/15/2017   Date of service: 01/19/2017   Hospital Day: 5      Assessment and Plan  _______________________________  #. 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 (I10) Malignant hypertension  --Keep BP <140 mm Hg  --off Nicardipine  --management per hospitalist  #. PT/OT/Speech  --continue evaluations  #. Nutrition  --Per speech therapy evaluation   #. DVT Prophylaxis  --Mechanical only due to hemorrhage    Code Status: DNR/DNI      Interval History  _______________________________  Patient presented with speech difficulty and right sided weakness. Patient was normal when she went to bed the night before and was found confused with inappropriate speech in the morning. EMS was notified from staff at her rehab facility. CT head identified 2  cm left thalamic ICH without major MLS.  BP still elevated. Aphasia mildly improving - able to get out some words.    Review of Systems  _______________________________  Review of systems is limited by patient factors - aphasia   Physical Exam  _______________________________  Vitals: Temp: 98.3  F (36.8  C) Temp src: Oral BP: 157/84 mmHg   Heart Rate: 81 Resp: 18 SpO2: 99 % O2 Device: None (Room air)    Vital Signs with Ranges: Temp:  [97  F (36.1  C)-98.8  F (37.1  C)] 98.3  F (36.8  C)  Heart Rate:  [71-90] 81  Resp:  [11-25] 18  BP: (129-168)/(59-89) 157/84 mmHg  SpO2:  [67 %-100 %] 99 %    General Appearance:  No acute distress  Neuro:       Mental Status Exam:   drowsy, arouses to voice, unable to assess orientation. Dysarthric, aphasic, but able to get out some words. Mental status is decreased       Cranial Nerves:  Pupils 3 mm, reactive. EOMI. Right visual neglect. Right facial weakness. Tongue and uvula are midline. Other CN are normal           Motor:  Right hemiplegia. Mild increased tone on the right arm       Reflexes:  Normal DTR.Toes downgoing.         Sensory:  Right hypoesthesia               Coordination:   Intact finger-to-nose on the left, unable to assess on the right due to weakness       Gait: untestable   Cardiovascular: Regular rate and rhythm, no m/r/g  Lungs: Clear to auscultation  Abdomen: Soft, not tender, not distended  Extremities: No clubbing, no cyanosis, no edema    Medications  _______________________________    niCARdipine 40 mg in 200 mL 0.9% NaCl Stopped (01/18/17 0945)     NaCl 75 mL/hr at 01/19/17 0311       hydrALAZINE  50 mg Oral 4x Daily     lisinopril  5 mg Oral Daily     metoprolol  25 mg Oral BID     methylPREDNISolone  15 mg Intravenous Q24H       Data  _______________________________      Lab Data:   All data was reviewed by me personally  CBC RESULTS:  Recent Labs   Lab Test  01/19/17   0733  01/18/17   0420  01/17/17   0428   WBC  6.9  8.0  6.8   RBC  3.04*  2.98*   2.90*   HGB  9.6*  9.4*  9.2*   HCT  29.5*  28.9*  28.1*   PLT  209  204  207     Basic Metabolic Panel:  Recent Labs   Lab Test  01/19/17   0733  01/18/17   0420  01/17/17   0428   NA  148*  146*  144   POTASSIUM  3.4  3.9  4.0   CHLORIDE  117*  115*  113*   CO2  20  21  22   BUN  24  18  20   CR  0.85  0.81  0.97   GLC  126*  142*  163*   ABELINO  8.5  7.8*  8.0*     INR:  Recent Labs   Lab Test  01/15/17   0800  12/15/16   2232   INR  1.06  1.02      Troponin I:   Recent Labs   Lab Test  01/15/17   0800   TROPI  <0.015  The 99th percentile for upper reference range is 0.045 ug/L.  Troponin values in   the range of 0.045 - 0.120 ug/L may be associated with risks of adverse   clinical events.       UA Results:  Recent Labs   Lab Test  01/15/17   2100   10/27/16   1435   COLOR  Yellow   < >  Yellow   APPEARANCE  Clear   < >  Clear   URINEGLC  Negative   < >  Negative   URINEBILI  Negative   < >  Negative   URINEKETONE  5*   < >  Negative   SG  1.018   < >  <=1.005   UBLD  Moderate*   < >  Trace*   URINEPH  5.5   < >  6.0   PROTEIN  10*   < >  Negative   UROBILINOGEN   --    --   0.2   NITRITE  Negative   < >  Negative   LEUKEST  Trace*   < >  Negative   RBCU  11*   < >  O - 2   WBCU  4*   < >  O - 2    < > = values in this interval not displayed.          Imaging:   All imaging studies were reviewed personally  CT head 1/15/17:   1. Left thalamic parenchymal hemorrhage. Volume is approximately 3 cc. Mild mass effect on the third ventricle but no shift of midline structures.  2. Atrophy of the brain.  White matter changes consistent with small vessel ischemic disease.      CT angiography neck/head 1/15/17:  1. No occluded vessels or high-grade internal intracranial vascular stenosis.  2. Calcified plaque at the carotid bifurcations but no significant stenosis.  3. No arterial dissection identified.  4. 2 cm parenchymal hemorrhage in the left thalamic region.    MRI brain 1/16/17:   1. Left thalamic hematoma with some  increase in size since 1/15/2017 resulting in slightly more mass effect on the third ventricle.  2. Cerebral atrophy with chronic white matter changes.    CT head 1/17/17:  1. Slight increase in the size of the left thalamic hemorrhage since the CT of 1/15/2017. No significant change since the MR scan of 1/16/2017. No significant or shift of midline structures.  2. Atrophy of the brain. White matter changes consistent with small vessel ischemic disease.      Nicky Garcia, [facility]         Progress Notes by Eros Verdugo MD at 1/20/2017 10:11 AM     Author:  Eros Verdugo MD Service:  Neurology Author Type:  Physician    Filed:  1/20/2017 10:13 AM Note Time:  1/20/2017 10:11 AM Status:  Signed    :  Eros Verdugo MD (Physician)           [facility]  [facility] [place]  Neurology Daily Note      Admission Date:1/15/2017   Date of service: 01/20/2017   Hospital Day: 6      Assessment and Plan  _______________________________  #. 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 (I10) Malignant hypertension  --Keep BP <140 mm Hg  --management per hospitalist  #. PT/OT/Speech  --continue evaluations  #. Nutrition  --Per speech therapy evaluation   #. DVT Prophylaxis  --Mechanical only due to hemorrhage    Code Status: DNR/DNI     Will sign off. Follow up in 4-6 weeks in clinic after imaging.       Interval History  _______________________________  Patient presented with speech difficulty and right sided weakness. Patient was normal when she went to bed the night before and was found confused with inappropriate speech in the morning. EMS was notified  from staff at her rehab facility. CT head identified 2 cm left thalamic ICH without major MLS.  BP still elevated. Aphasia mildly improving - able to get out some words. Denies headache.     Review of Systems  _______________________________  Review of systems is limited by patient factors - aphasia   Physical Exam  _______________________________  Vitals: Temp: 97.7  F (36.5  C) Temp src: Oral BP: 158/82 mmHg   Heart Rate: 74 Resp: 16 SpO2: 95 % O2 Device: None (Room air)    Vital Signs with Ranges: Temp:  [97.5  F (36.4  C)-98.2  F (36.8  C)] 97.7  F (36.5  C)  Heart Rate:  [68-84] 74  Resp:  [12-18] 16  BP: (112-177)/(39-91) 158/82 mmHg  SpO2:  [94 %-97 %] 95 %    General Appearance:  No acute distress  Neuro:       Mental Status Exam:  Awake and alert.  Dysarthric, aphasic, but able to get out some words. Mental status is normal       Cranial Nerves:  Pupils 3 mm, reactive. EOMI. Right visual neglect. Right facial weakness. Tongue and uvula are midline. Other CN are normal           Motor:  Right hemiplegia. Mild increased tone on the right arm       Reflexes:  Normal DTR.Toes downgoing.         Sensory:  Right hypoesthesia               Coordination:   Intact finger-to-nose on the left, unable to assess on the right due to weakness       Gait: untestable   Cardiovascular: Regular rate and rhythm, no m/r/g  Lungs: Clear to auscultation  Abdomen: Soft, not tender, not distended  Extremities: No clubbing, no cyanosis, no edema    Medications  _______________________________       cloNIDine  0.1 mg Oral TID     hydrALAZINE  100 mg Oral 4x Daily     lisinopril  10 mg Oral Daily     gabapentin  300 mg Oral At Bedtime     metoprolol  25 mg Oral BID     methylPREDNISolone  15 mg Intravenous Q24H       Data  _______________________________      Lab Data:   All data was reviewed by me personally  CBC RESULTS:  Recent Labs   Lab Test  01/20/17   0820  01/19/17   0733  01/18/17   0420   WBC  4.4  6.9  8.0   RBC  2.97*   3.04*  2.98*   HGB  9.3*  9.6*  9.4*   HCT  28.8*  29.5*  28.9*   PLT  176  209  204     Basic Metabolic Panel:  Recent Labs   Lab Test  01/20/17   0820  01/19/17   0733  01/18/17   0420   NA  147*  148*  146*   POTASSIUM  3.4  3.4  3.9   CHLORIDE  116*  117*  115*   CO2  20  20  21   BUN  28  24  18   CR  0.96  0.85  0.81   GLC  104*  126*  142*   ABELINO  8.5  8.5  7.8*     INR:  Recent Labs   Lab Test  01/15/17   0800  12/15/16   2232   INR  1.06  1.02      Troponin I:   Recent Labs   Lab Test  01/15/17   0800   TROPI  <0.015  The 99th percentile for upper reference range is 0.045 ug/L.  Troponin values in   the range of 0.045 - 0.120 ug/L may be associated with risks of adverse   clinical events.       UA Results:  Recent Labs   Lab Test  01/15/17   2100   10/27/16   1435   COLOR  Yellow   < >  Yellow   APPEARANCE  Clear   < >  Clear   URINEGLC  Negative   < >  Negative   URINEBILI  Negative   < >  Negative   URINEKETONE  5*   < >  Negative   SG  1.018   < >  <=1.005   UBLD  Moderate*   < >  Trace*   URINEPH  5.5   < >  6.0   PROTEIN  10*   < >  Negative   UROBILINOGEN   --    --   0.2   NITRITE  Negative   < >  Negative   LEUKEST  Trace*   < >  Negative   RBCU  11*   < >  O - 2   WBCU  4*   < >  O - 2    < > = values in this interval not displayed.          Imaging:   All imaging studies were reviewed personally  CT head 1/15/17:   1. Left thalamic parenchymal hemorrhage. Volume is approximately 3 cc. Mild mass effect on the third ventricle but no shift of midline structures.  2. Atrophy of the brain.  White matter changes consistent with small vessel ischemic disease.      CT angiography neck/head 1/15/17:  1. No occluded vessels or high-grade internal intracranial vascular stenosis.  2. Calcified plaque at the carotid bifurcations but no significant stenosis.  3. No arterial dissection identified.  4. 2 cm parenchymal hemorrhage in the left thalamic region.    MRI brain 1/16/17:   1. Left thalamic hematoma with  some increase in size since 1/15/2017 resulting in slightly more mass effect on the third ventricle.  2. Cerebral atrophy with chronic white matter changes.    CT head 1/17/17:  1. Slight increase in the size of the left thalamic hemorrhage since the CT of 1/15/2017. No significant change since the MR scan of 1/16/2017. No significant or shift of midline structures.  2. Atrophy of the brain. White matter changes consistent with small vessel ischemic disease.                   Procedure Notes     No notes of this type exist for this encounter.      Progress Notes - Therapies (Notes from 01/20/17 through 01/23/17)     No notes of this type exist for this encounter.

## 2017-01-15 NOTE — PROGRESS NOTES
SPIRITUAL HEALTH SERVICES  Spiritual Assessment Progress Note  [facility] ICU   met with pt's partner, Caprice Birch.  Partner is pt's legal HC POA.  Partner wanted to process her [facility] document and is prepared to be pt's voice as decisions are made.    Partner stated that she was excited that pt would be coming home from TCU today and was planning on making her a special meal when she got the phone call that pt had a stroke.  Partner stated that she is focused on pt's quality of life and does not want her to suffer.  We talked briefly about the possibility of pt having swallowing issues after Speech had evaluated pt. Feeding tubes were briefly discussed.     Pt is described to be a spiritual person but not into organized Gnosticism.      Pt and partner has just moved to [place] (from [place])  in September to be closer to family.       encouraged self care with partner and provided emotional support.      SH will continue to follow for ongoing support.                                                                                                                                                   Ronel Mata M.Div., [facility]  Staff    Pager [phone]

## 2017-01-15 NOTE — PLAN OF CARE
Problem: Goal Outcome Summary  Goal: Goal Outcome Summary  OT: Pt just admitted by ambulance this AM so will reschedule eval for 1/16

## 2017-01-15 NOTE — PLAN OF CARE
Problem: Goal Outcome Summary  Goal: Goal Outcome Summary  SLP:  Bedside swallow evaluation completed.  Pt sleepy but able to participate. Pt wearing nasal cannula. Pt presented with moderate R side facial droop, oral-motor skills not formally assessed as Pt had difficulty following 1-step directions, presented with expressive aphasia. Pt showed overt signs of aspiration with all trials of PO intake.  Pt given 3 ice chips with a delayed swallow noted, Pt not able to initiate a second swallow, O2 sats noted to drop in the 70's after swallowing, no coughing/throat clearing noted, however, suspect Pt had silent aspiration.  Pt given 2 tsp of applesauce.  Pt noted to hold PO in oral cavity with a delayed swallow, several swallows noted for each teaspoon.   Again O2 sats dropped to mid 70's and Pt noted to have a weak cough.  Two trials of nectar-thick liquids by spoon also showed a cough and O2 sats dropped.  Recommendations: 1. Pt showing s/sx of aspiration with all trials of PO, recommend PT remain NPO.  Continue with frequent oral cares.  2.  SLP will follow for dysphagia Tx 1/16 for PO readiness. Pt will need a formal speech/language evaluation either during hospital stay or at next level of care.  3.  Recommend discharge to TCU

## 2017-01-15 NOTE — PLAN OF CARE
Problem: Goal Outcome Summary  Goal: Goal Outcome Summary  PT: Orders received, chart reviewed. Patient just admitted this AM, has pending work up and consults. Hold PT eval for today.

## 2017-01-15 NOTE — IP AVS SNAPSHOT
` ` Patient Information     Patient Name Sex     Michelle Lucio ([id number]) Female 1931       Room Bed    41 0741-01      Patient Demographics     Address Phone    [address]  [facility] [id number] [phone] (Home)      Patient Ethnicity & Race     Ethnic Group Patient Race    American       Emergency Contact(s)     Name Relation Home Work Mobile    Caprice Birch Spouse [phone]      no secondary  [phone]        Documents on File        Status Date Received Description       Documents for the Patient    Affiliate Privacy placeholder   phase3    Consent for EHR Access Received 10/27/16     Insurance Card Received 10/27/16     External Medication Information Consent Patient Refused 10/27/16     Patient ID Received 10/27/16     [facility] Specified Other       Consent for Services/Privacy Notice - Hospital/Clinic Received 10/27/16     Privacy Notice - [facility] Received 10/27/16     Consent for Services - [facility]UST       Consent for Services/Privacy Notice - Hospital/Clinic-Esign       HIM INDERJIT Authorization - File Only  16 ADVOCATE JEFFRY FIELDS    HIM INDERJIT Authorization - File Only  16 ADVOCATE JEFFRY BERNAL    Advance Directives and Living Will Not Received  Validation of AD  10/08/2001    Advance Directives and Living Will Received 16 Health Care Directive  10/08/2001    Advance Directives and Living Will Not Received  VALIDATION OF AD 16    Advance Directives and Living Will Received 16 HEALTH CARE DIRECTIVE 10/8/2001       Documents for the Encounter    CMS IM for Patient Signature Received 17 1MM    CMS IM for Patient Signature Received 17 [facility]    Assessment/Questionnaire  17 MRI HISTORY QUESTIONNAIRE AND CONTRAST NOTICE    CMS IM for Patient Signature Received 17 2MM      Admission Information     Attending Provider Admitting Provider Admission Type Admission Date/Time    Suzi Kinsey MD Parpia, Abdul K, MD  Emergency 01/15/17  0702    Discharge Date Hospital Service Auth/Cert Status Service Area     ICU Incomplete [facility]    Unit Room/Bed Admission Status    [facility] 0741/0741-01 Admission (Confirmed)            Admission     Complaint    ICH (intracerebral hemorrhage) (H)      Hospital Account     Name Acct ID Class Status Primary Coverage    Michelle Lucio [id number] Inpatient Open MEDICARE - MEDICARE PT A ONLY            Guarantor Account (for Hospital Account #[id number])     Name Relation to Pt Service Area Active? Acct Type    Michelle Lucio Self FCS Yes Personal/Family    Address Phone          [address]  [place] [address] [phone](H)              Coverage Information (for Hospital Account #[id number])     1. MEDICARE/MEDICARE PT A ONLY     F/O Payor/Plan Precert #    MEDICARE/MEDICARE PT A ONLY     Subscriber Subscriber #    Michelle Lucio [id number]    Address Phone    [employer]  [address]  [place] [address] [phone]          2. [employer]     F/O Payor/Plan Precert #    [employer]     Subscriber Subscriber #    Michelle Lucio [id number]    Address Phone    [address] [address]  [place] [address] [phone]

## 2017-01-15 NOTE — ED PROVIDER NOTES
History     Chief Complaint:  Speech Difficulty and One Sided Weakness      HPI   Michelle Lucio is a 85 year old female, with a history of TIA, HLD, HTN, and spinal stenosis, who presents via EMS with speech difficulty and one sided weakness. Per EMS, the staff at the patient's rehab facility report that the patient was acting normally last night when she went to bed around 2000 or 2100. However, when they checked on her this morning, they found that she was confused and having great difficulty talking with inappropriate speech. The patient was also having difficulty smiling and had right arm weakness. She was also incontinent of urine. Per EMS, glucose was 98 en route. On arrival, patient is alert. She denies any pain. Family has been alerted by her rehab facility that the patient was brought here.    Allergies:  Colchicine: diarrhea  Hydroxychloroquine  Pilocarpine: diaphoresis, chills  Probenecid: diarrhea  Tizanidine: dizziness     Medications:    Evista  Antivert  Gabapentin  Allopurinol  Prednisone  Metoprolol  Cyanocobalamin  Tramadol-acetaminophen  Miralax  Systane Ultra  Aspirin    Past Medical History:    Renal disease  HLD  TIA  Spinal stenosis  Vertigo  HTN  Spondylitis  Arthritis  Sjogren's syndrome  Neuropathy  Anemia    Past Surgical History:    ENT surgery  Hernia repair  Gyn surgery  Eye surgery  Open reduction internal fixation hip nailing     Family History:    History reviewed. No pertinent family history.       Social History:  Marital status:   Former smoker, positive for alcohol use.  The patient presents via EMS.    Review of Systems   Unable to perform ROS: Mental status change   Neurological: Positive for facial asymmetry, speech difficulty and weakness (right side).   Psychiatric/Behavioral: Positive for confusion.     Physical Exam     Patient Vitals for the past 24 hrs:   BP Temp Temp src Heart Rate Resp SpO2 Height Weight   01/15/17 0840 171/62 mmHg - - - - - - -   01/15/17 0832  "180/79 mmHg - - 88 16 95 % - -   01/15/17 0830 177/76 mmHg - - 89 22 96 % - -   01/15/17 0827 186/84 mmHg - - 85 16 97 % - -   01/15/17 0820 (!) 151/122 mmHg - - - - - - -   01/15/17 0815 - - - 89 28 97 % - -   01/15/17 0810 - - - 87 14 91 % - -   01/15/17 0800 (!) 200/95 mmHg - - 86 11 97 % - -   01/15/17 0750 - - - 87 12 97 % - -   01/15/17 0745 193/83 mmHg - - 89 18 94 % - -   01/15/17 0742 181/81 mmHg - - 90 (!) 32 (!) 87 % - -   01/15/17 0740 (!) 191/108 mmHg - - - - - - -   01/15/17 0723 170/66 mmHg - - 87 12 97 % - -   01/15/17 0706 177/84 mmHg 98.4  F (36.9  C) Temporal 82 - 96 % 1.651 m (5' 5\") 67.132 kg (148 lb)      Physical Exam  General: Patient is alert and has right sided weakness and facial droop with baseline confusion.  HEENT: Head atraumatic    Eyes: pupils equal and reactive. Conjunctiva clear   Nares: patent   Oropharynx: no lesions, uvula midline, no palatal draping, dysarthria and expressive aphasia, no trismus  Neck: Supple without lymphadenopathy, no meningismus  Chest: Heart regular rate and rhythm.   Lungs: Equal clear to auscultation with no wheeze or rales  Abdomen: Soft, non tender, nondistended, normal bowel sounds  Back: No costovertebral angle tenderness, no midline C, T or L spine tenderness  Neuro: Right sided facial droop, weakness of right arm held in contracture, right leg with minimal movement against gravity, expressive aphasia and dysarthria  Extremities: No deformities, equal radial and DP pulses. No clubbing, cyanosis.  No edema  Skin: Warm and dry with no rash.       Emergency Department Course     Imaging:  Radiographic findings were communicated with the patient's wife who voiced understanding of the findings.    Head CT, without contrast, as per radiology:   1. Left thalamic parenchymal hemorrhage. Volume is approximately 3 cc. Mild mass effect on the third ventricle but no shift of midline structures.  2. Atrophy of the brain.  White matter changes consistent with " small vessel ischemic disease.    Head/Neck CTA, with and without contrast, as per radiology:   1. No occluded vessels or high-grade internal intracranial vascular stenosis.  2. Calcified plaque at the carotid bifurcations but no significant stenosis.  3. No arterial dissection identified.  4. 2 cm parenchymal hemorrhage in the left thalamic region.      Laboratory:  CBC: WBC 4.2 (WNL) HGB 9.8 (L)  (WNL) RBC 3.10 (L) HCT 30.3 (L) RDW 16 (H)  BMP: Creatinine 1.06 (H) Glucose 85 (WNL) GFR 49 (L) Rest WNL  INR: 1.06 (WNL)   PTT: 25 (WNL)   0800: Troponin I: <0.015 (WNL)      UA: Clear, light yellow urine; Albumin 10 (A) Rest WNL     Interventions:  0812: Normal Saline, 500 mL, IV injection    0813: Cardene infusion, 2.5 mg/hr, IV injection    ED Course:  Nursing notes and vitals reviewed.  I performed an exam of the patient as documented above.     0700: Patient arrived. I examined the patient.  0753: I checked in with the patient.  0813: I spoke to Dr. Verdugo of neurology, on call for neuro critical care, about the patient.  0834: Dr. Verdugo assessed the patient in the ED and we discussed the plan. She will be admitted to the ICU.  0850: I spoke to Dr. Kinsey of the hospitalist service about admitting the patient. He accepted.    Findings and plan explained to the Patient's wife who consents to admission. Discussed the patient with Dr. Kinsey, who will admit the patient to an ICU bed for further monitoring, evaluation, and treatment.      Impression & Plan      CMS Diagnoses: The patient has stroke symptoms:           ED Stroke specific documentation           NIHSS PDF          Protocol PDF     Patient last known well time: 2030 1/14/17  ED Provider first to bedside at: 0700  CT Results received at: 0740  Patient was not treated with TPA due to the following reason(s):  Out of the treatment time window    National Institutes of Health Stroke Scale (Baseline)  Time Performed: 0700      Score    Level of  consciousness: (1)   Not alert; arousable w/ minor stim to obey/answer/respond    LOC questions: (2)   Answers neither question correctly    LOC commands: (0)   Performs both tasks correctly    Best gaze: (1)   Partial gaze palsy    Visual: (0)   No visual loss    Facial palsy: (2)   Partial paralysis (total/near total of lower face)    Motor arm (left): (0)   No drift    Motor arm (right): (3)   No effort against gravity    Motor leg (left): (0)   No drift    Motor leg (right): (2)   Some effort against gravity    Limb ataxia: (2)   Present in two limbs    Sensory: (0)   Normal- no sensory loss    Best language: (2)   Severe aphasia    Dysarthria: (1)   Mild to moderate dysarthria    Extinction and inattention: (0)   No abnormality        Total Score:  16        Stroke Mimics were considered (including migraine headache, seizure disorder, hypoglycemia (or hyperglycemia), head or spinal trauma, CNS infection, Toxin ingestion and shock state (e.g. sepsis) .    Medical Decision Making:  Michelle Lucio is a 85 year old female who presents to the Emergency Department. She was last known normal last night at about 8:30pm when medications were given. This morning on waking up, she was confused and not moving her right side. Per her wife who is here with her, she did not get her blood pressure medications yesterday because her blood pressure was actually quite low. By evening, it had risen to 112 systolic so she was not given any nighttime meds either. Patient is not on any blood thinners aside from aspirin. Here, her urinalysis is negative for infection, CBC shows a mild anemia with a hemoglobin of 9.8 but that is at baseline for her, INR is normal, PTT is normal, and troponin is negative. CT of her head shows a left thalamic parenchymal hemorrhage of about 3 CC's. There is midline mass effect on the third ventricle but no shift of midline structures. CT angio of the head and neck shows no occluded vessels or high grade  vascular stenosis. Patient was started on a nicardipine drip for blood pressure management. Most recent blood pressure is 144/70 with a goal of getting her systolic blood pressures less than 140. She will be admitted to the ICU. She was seen by Dr. Verdugo here in the Emergency Department. She has no anticoagulants to reverse at this time. I confirmed with her wife who also has durable medical power of  who states that she would be DNR/DNI. At this point, she is mentating appropriately and does not require any sort of intervention for airway protection. That could revisited if the need arose, but at this time she is alert, although she has an expressive aphasia and right sided weakness and facial droop and some mild confusion. Patient's wife understands the plan for ICU admission, aggressive blood pressure management, and frequent neuro checks as well as repeat imaging for any worsening signs or symptoms. I discussed with the hospitalist who kindly agrees to accept the patient for admission to the ICU.    Critical Care time was 45 minutes for this patient excluding procedures.     Diagnosis:    ICD-10-CM    1. Nontraumatic subcortical hemorrhage of left cerebral hemisphere (H) I61.0    2. Malignant hypertension I10    3. Thalamic hemorrhage with stroke (H) I61.9    4. Essential hypertension, malignant I10      Disposition:   Admission for further evaluation, monitoring, and treatment.      I, Holley Garcia, am serving as a scribe on 1/15/2017 at 7:08 AM to personally document services performed by Sho Lazo MD, based on my observations and the provider's statements to me.            Sho Lazo MD  01/15/17 [id number]

## 2017-01-15 NOTE — IP AVS SNAPSHOT
` `     [place] [address] [facility]: [phone]            Medication Administration Report for Michelle Lucio as of 01/23/17 1551   Legend:    Given Hold Not Given Due Canceled Entry Other Actions    Time Time (Time) Time  Time-Action       Inactive    Active    Linked        Medications 01/17/17 01/18/17 01/19/17 01/20/17 01/21/17 01/22/17 01/23/17    acetaminophen (TYLENOL) tablet 650 mg  Dose: 650 mg Freq: EVERY 6 HOURS PRN Route: PO  PRN Reasons: mild pain,fever  Start: 01/19/17 1502   Admin Instructions: Maximum acetaminophen dose from all sources = 75 mg/kg/day not to exceed 4 grams/day.          0025 (650 mg)-Given       1153 (650 mg)-Given            cloNIDine (CATAPRES) tablet 0.1-0.2 mg  Dose: 0.1-0.2 mg Freq: 4 TIMES DAILY PRN Route: PO  PRN Comment: For SBP >140  Start: 01/20/17 1519        0504 (0.1 mg)-Given         0445 (0.1 mg)-Given           gabapentin (NEURONTIN) capsule 300 mg  Dose: 300 mg Freq: AT BEDTIME Route: PO  Start: 01/19/17 2200      2035 (300 mg)-Given               2209 (300 mg)-Given        1950 (300 mg)-Given               2024 (300 mg)-Given               [ ] 2200           hydrALAZINE (APRESOLINE) injection 20 mg  Dose: 20 mg Freq: EVERY 4 HOURS PRN Route: IV  PRN Reason: high blood pressure  PRN Comment: give for SBP > 140  Start: 01/18/17 1454     1854 (20 mg)-Given       2257 (20 mg)-Given        0335 (20 mg)-Given       0858 (20 mg)-Given       1357 (20 mg)-Given        0415 (20 mg)-Given        0659 (20 mg)-Given             hydrALAZINE (APRESOLINE) tablet 100 mg  Dose: 100 mg Freq: 4 TIMES DAILY Route: PO  Start: 01/19/17 1300   Admin Instructions: Hold if SBP <120       1206 (100 mg)-Given       1759 (100 mg)-Given       2035 (100 mg)-Given               0828 (100 mg)-Given       1324 (100 mg)-Given       1740 (100 mg)-Given       2208 (100 mg)-Given        0827 (100 mg)-Given       1644 (100 mg)-Given       1949 (100 mg)-Given        0025 (100 mg)-Given        0818 (100 mg)-Given       (1231)-Not Given       2023 (100 mg)-Given        0028 (100 mg)-Given       0846 (100 mg)-Given       (1422)-Not Given       [ ] 2000       [ ] 2300           HYDROmorphone (PF) (DILAUDID) injection 0.3-0.5 mg  Dose: 0.3-0.5 mg Freq: EVERY 2 HOURS PRN Route: IV  PRN Reason: severe pain  Start: 01/15/17 0941   Admin Instructions: Hold while on PCA.     2206 (0.5 mg)-Given           1102 (0.5 mg)-Given             lisinopril (PRINIVIL/ZESTRIL) tablet 40 mg  Dose: 40 mg Freq: DAILY Route: PO  Start: 01/23/17 0900   Admin Instructions: Hold for SBP < 100           0846 (40 mg)-Given           methylPREDNISolone sodium succinate (solu-MEDROL) injection 15 mg  Dose: 15 mg Freq: EVERY 24 HOURS Route: IV  Start: 01/15/17 1230   Admin Instructions: Doses >125mg need to be in at least 50 mL IVPB     1338 (15 mg)-Given        1228 (15 mg)-Given        1211 (15 mg)-Given        1324 (15 mg)-Given        1107 (15 mg)-Given        1231 (15 mg)-Given        1428 (15 mg)-Given           naloxone (NARCAN) injection 0.1-0.4 mg  Dose: 0.1-0.4 mg Freq: EVERY 2 MIN PRN Route: IV  PRN Reason: opioid reversal  Start: 01/15/17 0941   Admin Instructions: For respiratory rate LESS than or EQUAL to 8.  Partial reversal dose:  0.1 mg titrated q 2 minutes for Analgesia Side Effects Monitoring Sedation Level of 3 (frequently drowsy, arousable, drifts to sleep during conversation).Full reversal dose:  0.4 mg bolus for Analgesia Side Effects Monitoring Sedation Level of 4 (somnolent, minimal or no response to stimulation).               ondansetron (ZOFRAN) injection 4 mg  Dose: 4 mg Freq: EVERY 6 HOURS PRN Route: IV  PRN Reasons: nausea,vomiting  Start: 01/15/17 0941             Completed Medications  Medications 01/17/17 01/18/17 01/19/17 01/20/17 01/21/17 01/22/17 01/23/17         Dose: 10 mg Freq: ONCE Route: PO  Start: 01/21/17 0945   End: 01/21/17 1111   Admin Instructions: Hold for SBP < 100         1111 (10  mg)-Given            Discontinued Medications  Medications 01/17/17 01/18/17 01/19/17 01/20/17 01/21/17 01/22/17 01/23/17         Dose: 0.1 mg Freq: 3 TIMES DAILY Route: PO  Start: 01/22/17 1015   End: 01/22/17 1019   Admin Instructions: Hold if SBP <120          1019-Med Discontinued  (1956)-Not Given              Dose: 30 mg Freq: EVERY 8 HOURS SCHEDULED Route: PO  Start: 01/20/17 1600   End: 01/21/17 0844   Admin Instructions: Hold if SBP <120 or HR < 60        1624 (30 mg)-Given        0018 (30 mg)-Given       0827 (30 mg)-Given       0844-Med Discontinued           Dose: 60 mg Freq: EVERY 8 HOURS SCHEDULED Route: PO  Start: 01/21/17 1600   End: 01/22/17 1019   Admin Instructions: Hold if SBP <120 or HR < 60         1645 (60 mg)-Given        0025 (60 mg)-Given       0815 (60 mg)-Given       1019-Med Discontinued          Dose: 10 mg Freq: DAILY Route: PO  Start: 01/20/17 0900   End: 01/21/17 0935   Admin Instructions: Hold for SBP < 100        0828 (10 mg)-Given        0827 (10 mg)-Given       0935-Med Discontinued           Dose: 20 mg Freq: ONCE Route: PO  Start: 01/22/17 1030   End: 01/22/17 1027   Admin Instructions: Hold for SBP < 100          1027-Med Discontinued          Dose: 20 mg Freq: DAILY Route: PO  Start: 01/22/17 0900   End: 01/22/17 1019   Admin Instructions: Hold for SBP < 100          0819 (20 mg)-Given       1019-Med Discontinued          Dose: 25 mg Freq: 2 TIMES DAILY Route: PO  Start: 01/17/17 1100   End: 01/21/17 0844    1109 (25 mg)-Given       2026 (25 mg)-Given        0900 (25 mg)-Given       2030 (25 mg)-Given        0900 (25 mg)-Given       2035 (25 mg)-Given        0829 (25 mg)-Given       2209 (25 mg)-Given        0827 (25 mg)-Given       0844-Med Discontinued      Medications 01/17/17 01/18/17 01/19/17 01/20/17 01/21/17 01/22/17 01/23/17

## 2017-01-15 NOTE — H&P
DATE OF ADMISSION:  01/15/2017       CHIEF COMPLAINT:  Right-sided weakness, difficulty with speech.      The history is mainly obtained from her partner, Caprice, who was her power of  who is in attendance.        HISTORY OF PRESENT ILLNESS:  Michelle Lucio is an 85-year-old -American female with a prior history of TIA, hyperlipidemia, hypertension, spinal stenosis, who was recently admitted to [facility] on 12/16/2016 for a fall and left intertrochanteric hip fracture.  She was seen by Orthopedics and subsequently underwent surgery on 12/16/2016 and was discharged on 12/20/2016 to a rehab facility.  The patient was actually supposed to be discharged home today; however, this morning, she was noted to have aphasia as well as facial droop on the and right-sided weakness and hence she was brought into the ER for further evaluation.  There have been no falls at the rehab facility.  In the ER, the patient was evaluated by Dr. Sho Lazo.  She was noted to be hypertensive on presentation.  Further evaluation with a CT scan of the head showed left thalamic parenchymal hemorrhage with mild mass effect on the 3rd ventricle but no shift of midline structures.  The patient has been seen by Dr. Verdugo in the ER who recommends admission to the Intensive Care Unit as well as MRI of the brain tomorrow and continuing IV nicardipine.      PAST MEDICAL HISTORY:  Hyperlipidemia, TIA, neuropathy, Sjogren syndrome, hypertension, spinal stenosis and gout.      PAST SURGICAL HISTORY:  Significant for open reduction internal fixation of her left hip, prior eye surgeries, GYN surgery, hernia repair.      FAMILY HISTORY:  Unable to be obtained as the patient is altered and has aphasia.      ALLERGIES:  She is allergic to colchicine, hydroxychloroquine, pilocarpine, probenecid and tizanidine.      SOCIAL HISTORY:  The patient is a habitual drinker; however, her last drink was in mid December.  She does not smoke.       REVIEW OF SYSTEMS:  The patient denies any chest pain or shortness of breath.  Rest of the review of systems is limited secondary to significant aphasia.      PHYSICAL EXAMINATION:   VITAL SIGNS:  Temperature is 98.4, pulse 82, blood pressure is 153/74, respiratory rate 15, O2 sat is 96% on room air.   GENERAL:  The patient is alert, awake.  She has aphasia as well as slurred speech.   HEENT:  Pupils equal, round, react to light.   LUNGS:  Clear to auscultation anteriorly bilaterally.   HEART:  Regular rate, S1, S2 normal.   ABDOMEN:  Soft, nontender, with good bowel sounds.   EXTREMITIES:  She has 1+ edema on her left lower extremity.   NEUROLOGIC:  She has a right-sided facial droop.  She has decreased sensation of the right upper and lower extremities.  She has right hemiplegia and neglect on the right.      LABORATORY:  Labwork obtained shows a sodium of 138, potassium 3.9, chloride 105, bicarbonate 24, BUN 26, creatinine 1.06, GFR of 49.  Troponin is undetectable.  Glucose is 85.  On a CBC, a white cell count is 4.2, hemoglobin 9.8, hematocrit 30.3, platelet count of 225.  Diff is grossly within normal limits.      Urinalysis is grossly within normal limits.  Her INR is 1.06.  She had an EKG carried out here in the ER which showed normal sinus rhythm at 81 beats per minute.  No significant ST-T wave changes to indicate ischemia.      She had a CT of the head without contrast, which showed left thalamic parenchymal hemorrhage, volume is approximately 3 mL.  Mild mass effect on the 3rd ventricle but no shift of midline structures.  Atrophy of the brain and white matter changes consistent with small vessel ischemic disease.  CT of the neck angio showed no occluded vessels, no arterial dissection identified, calcified plaque in the carotid bifurcations but no significant stenosis, 2 cm parenchymal hemorrhage in the left thalamic region.      ASSESSMENT AND PLAN:   1.  Intracranial hemorrhage, left thalamic  parenchymal hemorrhage.  We will admit her as an inpatient to the Intensive Care Unit.  We will continue with nicardipine drip as recommended by Dr. Verdugo to keep systolic blood pressure less than 140.  We will place her on deep venous thrombosis prophylaxis mechanical only.  We will perform neuro checks on her.  We will get an MRI of the brain on 2017.  Will begin PT, OT as well as speech and swallow evaluations.   2.  History of Sjogren syndrome, on chronic daily prednisone.  Will administer via IV.  3.  Code status as obtained from her power of  who was present over here was DNR/DNI.         ANNETTA YOUNG MD             D: 01/15/2017 09:20   T: 01/15/2017 09:58   MT: [email or web address]      Name:     AUDRA ALICEA   MRN:      [id number]        Account:      [id number]   :      1931           Admitted:     [id number]      Document: [id number]

## 2017-01-15 NOTE — ED NOTES
Bed: ST  Expected date: 1/15/17  Expected time: 6:52 AM  Means of arrival: Ambulance  Comments:  [id number] Stroke Sx

## 2017-01-16 NOTE — PLAN OF CARE
Problem: Stroke (Hemorrhagic) (Adult)  Goal: Signs and Symptoms of Listed Potential Problems Will be Absent or Manageable (Stroke)  Signs and symptoms of listed potential problems will be absent or manageable by discharge/transition of care (reference Stroke (Hemorrhagic) (Adult) CPG).   Outcome: No Change  Pt. Continues on RA overnight, sating high 90's. LS diminished throughout. BP controlled with nicardapine gtt to maintain SBP <140. T max 102.9 axillary, MD notified. UA sent with BC and CXR, no other orders received due to neuro temp, coming down with cool cloths. Cruz intact with marginal UO. Remains NPO pending swallow study. Continues with R side facial droop, RUE flaccid, RLE slight contraction. Unable to follow most commands with neuro assessment, speech garbled. PERRLA, somnolent throughout the night. Continue to monitor BP closely, notify MD of any changes.

## 2017-01-16 NOTE — PROGRESS NOTES
ICU Brief Note  Patient condition reviewed and discussed while on multidisciplinary rounds today.   The Critical Care service will continue to follow peripherally while patient is within the ICU. We are readily available should issues arise.  Please feel free to contact us for anything with which we may be of assistance.    Cami Mcknight

## 2017-01-16 NOTE — PLAN OF CARE
Problem: Individualization  Goal: Patient Preferences  Outcome: Therapy, unable to show any progress toward functional goals  Patient is drowsy.  Speech is garbled.  Able to say hospital once. Able to lift eyebrows to command and plantar and dorsiflex feet.   Right arm is contracted, falls immediately to the bed once lifted.Can lift left arm but does not understand finger to nose. Able to use left arm to put swab in mouth. Able to plantar and dorsiflex both feet bur right weaker than left.  Right field cut present. GIOVANNI Facial droop present.  Speech here to assess, patient failed swallow so kept NPO.  Titrating Nicardipine drip to maintain SBP< 140.  UOP adequate.  Continue close assessments all systems and continue to update MD any changes immediately.  Support to patient and family.

## 2017-01-16 NOTE — PLAN OF CARE
Problem: Goal Outcome Summary  Goal: Goal Outcome Summary  PT:Evaluation completed, treatment initiated, but limited secondary to elevated HR (120s-130) and systolic BP (150's). 84 yo female adm from rehab facility with increased confusion and weakness, found to have a left thalamic ICH.  Presents with aphasia and R hemiparesis.  Pt lethargic, opens eyes to name has almost continuous motion of the L UE when eyes are open-grabs and  pulls at what ever is near by-purposeful yet clumbsy. Provided a pink sponge for tactile input for the L hand. Pt able to participate in a few LE AROM exercises on the L, no active movement noted on the R UE or LE; however, pt's spouse states she has seen some spontaneous movement in the R LE, none in the R UE. Pt dependent to roll, once on the R side pt more alert, partially sits upright in the bed without assist. RN with pt to place new IV upon PT exit.     Pt would benefit from a lift room; until then use of the portable lift for up to chair and pt repositioning is recommended for optimal safe patient handling.     Pt had been at rehab since December 20th with a plan for discharge to home this Tuesday (1/17/17). Per pt's spouse, Caprice, the pt had been able to complete bed mobility and transfers with occasional assist, but mostly SBA. She had been able to dress herself and ambulate with a walker short household distances during her time at rehab.    Recommend return to rehab facility at discharge.

## 2017-01-16 NOTE — PROGRESS NOTES
"[facility]    Hospitalist Progress note  Sole Velez MD    1/16/2017     ASSESSMENT AN PLAN:    Non traumatic subcortical hemorrhage of the  Left cerebral hemisphere: Admitted to ICU.   ---- 2 cm left thalamic hemorrhage  --- Neurology consulted and followed. Input appreciated.  --- MRI of the brain pending  --- PT/OT/SLP    Malignant HTN: Only on metoprolol?   --- Keep BP <140/90  --- On Nicardipine drip to keep SBP <140  --- PRN Labetalol and hydralazin for SBP >140    DVT: SCDs.      Disposition: Depends on the clinical course.      Sole Velez MD  [facility]  Board certified,   Internal Medicine  Pager # [phone]  1/16/2017      SUBJECTIVE: Patient seen and examine. Garbled speech  New overnight event: None.     OBJECTIVE:    /59 mmHg  Temp(Src) 100.8  F (38.2  C) (Axillary)  Resp 26  Ht 1.651 m (5' 5\")  Wt 63.1 kg (139 lb 1.8 oz)  BMI 23.15 kg/m2  SpO2 100%     GENERAL:  NAD.   HEENT:  Head is normocephalic and atraumatic. PERRL. EOMI. No scleral icterus. No conjunctival erythema. No nasal discharge.  Moist mucous membranes. Pharynx unremarkable.  NECK:  Supple. Non-tender. No LAD or masses.  No carotid bruits.  LUNGS:  Clear to auscultation bilaterally. No wheezing, rhonchi or rales.   HEART:  RRR. S1 S2. No murmurs.  ABD:  Soft, ND, NT, + BS. No masses or HSM.   SKIN:  No ulcers, rashes or lesions.   EXT:  No calf tenderness. Pulses equal throughout.   MSK:  No deformities.  Neuro: Right facile drop. Garbled speech        Labs:    Most Recent 3 CBC's:  Recent Labs   Lab Test  01/16/17   0445  01/15/17   0800 12/22/16   WBC  5.4  4.2  3.0*   HGB  9.2*  9.8*  8.5*   MCV  97  98  99.2   PLT  230  225  164      Most Recent 3 BMP's:  Recent Labs   Lab Test  01/16/17   0445  01/15/17   0800 12/22/16 12/19/16   0610   NA  143  138  137  143   POTASSIUM  4.1  3.9  3.5  3.9   CHLORIDE  111*  105  106  112*   CO2  20  24  24  22   BUN  19  26  23  23   CR  " 1.04  1.06*  0.99  0.93   ANIONGAP  12  9   --   9   ABELINO  8.2*  9.0  8.6  7.9*   GLC  111*  85  107*  106*     Most Recent 3 Troponin's:  Recent Labs   Lab Test  01/15/17   0800   TROPI  <0.015  The 99th percentile for upper reference range is 0.045 ug/L.  Troponin values in   the range of 0.045 - 0.120 ug/L may be associated with risks of adverse   clinical events.       Most Recent 3 INR's:  Recent Labs   Lab Test  01/15/17   0800  12/15/16   2232   INR  1.06  1.02     Most Recent 2 LFT's:No lab results found.  Most Recent Cholesterol Panel:No lab results found.  Most Recent 6 Bacteria Isolates From Any Culture (See EPIC Reports for Culture Details):  Recent Labs   Lab Test  01/15/17   2135  01/15/17   2130   CULT  No growth after 3 hours  No growth after 3 hours     Most Recent TSH, T4 and HgbA1c: No lab results found.    Prescriptions prior to admission   Medication Sig Dispense Refill Last Dose     Acetaminophen (TYLENOL PO) Take 650 mg by mouth every 6 hours as needed for mild pain or fever   prn     senna-docusate (SENOKOT-S;PERICOLACE) 8.6-50 MG per tablet Take 1 tablet by mouth 2 times daily   1/14/2017 at pm     TRAMADOL HCL PO Take 50 mg by mouth every 6 hours as needed for moderate to severe pain   prn     polyethylene glycol (MIRALAX/GLYCOLAX) powder Take 17 g by mouth daily as needed    prn     ACETAMINOPHEN PO Take 650 mg by mouth 3 times daily    1/14/2017 at pm     folic acid (FOLVITE) 1 MG tablet Take 1 tablet (1 mg) by mouth daily 30 tablet 0 1/14/2017 at am     aspirin 81 MG tablet Take 1 tablet (81 mg) by mouth daily 30 tablet  1/14/2017 at am     CYANOCOBALAMIN SL Place 500 mcg under the tongue daily   1/14/2017 at am     polyethylene glycol 0.4%- propylene glycol 0.3% (SYSTANE ULTRA) 0.4-0.3 % SOLN ophthalmic solution Place 1 drop into both eyes every hour as needed for dry eyes   prn     metoprolol (TOPROL-XL) 25 MG 24 hr tablet Take 1 tablet (25 mg) by mouth daily   1/14/2017 at am      simvastatin (ZOCOR) 20 MG tablet Take 1 tablet (20 mg) by mouth At Bedtime   1/14/2017 at hs     predniSONE (DELTASONE) 5 MG tablet Take 5 mg by mouth daily    1/14/2017 at am     gabapentin (NEURONTIN) 300 MG capsule Take 300 mg by mouth At Bedtime    1/14/2017 at hs     allopurinol (ZYLOPRIM) 100 MG tablet Take 1 tablet (100 mg) by mouth daily   1/14/2017 at am     meclizine (ANTIVERT) 25 MG tablet Take 1 tablet (25 mg) by mouth every 6 hours as needed for dizziness   prn     raloxifene (EVISTA) 60 MG tablet Take 1 tablet (60 mg) by mouth daily   1/14/2017 at am     calcium carbonate (OS-ABELINO 600 MG [place]. [place]) 1500 (600 CA) MG tablet Take 1 tablet (600 mg) by mouth 2 times daily   1/14/2017 at am     Multiple Vitamins-Minerals (CENTRUM SILVER) per tablet Take 1 tablet by mouth daily   1/14/2017 at am     omega 3 1000 MG CAPS Take 1 g by mouth daily   1/14/2017 at am       Scheduled medications:    methylPREDNISolone  15 mg Intravenous Q24H       I/O last 3 completed shifts:  In: 2682.5 [I.V.:2682.5]  Out: 1570 [Urine:1570]  Data  Data reviewed today:  I personally reviewed no images or EKG's today.    Recent Labs  Lab 01/16/17  0445 01/15/17  0800   WBC 5.4 4.2   HGB 9.2* 9.8*   MCV 97 98    225   INR  --  1.06    138   POTASSIUM 4.1 3.9   CHLORIDE 111* 105   CO2 20 24   BUN 19 26   CR 1.04 1.06*   ANIONGAP 12 9   ABELINO 8.2* 9.0   * 85   TROPI  --  <0.015The 99th percentile for upper reference range is 0.045 ug/L.  Troponin values in the range of 0.045 - 0.120 ug/L may be associated with risks of adverse clinical events.       Recent Results (from the past 24 hour(s))   XR Chest Port 1 View    Narrative    CHEST PORTABLE ONE VIEW 1/15/2017 8:41 PM     COMPARISON: Frontal chest x-ray 12/15/2016.    HISTORY: Stroke, intracranial hemorrhage.    FINDINGS: The cardiac silhouette, pulmonary vasculature, lungs and  pleural spaces are within normal limits.      Impression    IMPRESSION: Clear  lungs.    YOSI ROSALES MD

## 2017-01-16 NOTE — PLAN OF CARE
Problem: Goal Outcome Summary  Goal: Goal Outcome Summary  SLP: Regulo tx completed this PM to assess for PO readiness. Pt awake and fully participatory; ongoing (R) facial droop and aphasia. Generally unable to follow commands. Pt occasionally vocalizing. Oral cares completed prior to PO. Pt assessed with ice chips, 1/2 teaspoons of thin liquids, 1/2 teaspoons of nectar-thick liquids, and 1/2 teaspoons of puree textures. She exhibited overt s/sx of aspiration with sips of thin liquids by spoon marked by immediate weak cough. No overt s/sx of aspiration with ice chips x3, nectar-thick liquids by spoon, or puree textures by spoon. No coughing, choking, throat clearing; voice remained clear and consistent with baseline. No drop in O2 sats observed. Ongoing deficits include prolonged oral transit, delayed swallow response, reduced hyolaryngeal elevation, and occasional multiple swallows. No oral residue noted after puree textures. Pt remains at high risk of aspiration at this time. Recommendations: 1) Continue NPO; however, per MD approval, pt appropriate for small amounts of PO for pleasure: 1/2 teaspoons of nectar-thick liquids and 1/2 teaspoons of puree textures with strict adherence to swallowing precautions. Precautions include oral cares prior to PO, 1:1 supervision/assistance with feeding, sit upright at 90 degrees, slow pace and verify swallow, 1/2 teaspoons amounts by spoon only, alternate solids/liquids, remain upright 30 min after PO. Please discontinue PO for pleasure if s/sx of aspiration noted or change in respiratory status. 2) SLP will continue to follow to assess appropriateness to initiate diet and determine need for VFSS. 3) Pt will need a formal speech/language evaluation either during hospital stay or at next level of care. 4) Recommend discharge to TCU.

## 2017-01-16 NOTE — PROGRESS NOTES
SPIRITUAL HEALTH SERVICES  Spiritual Assessment Progress Note  [facility] ICU   had a brief conversation with pt and her wife.  Pt was trying to communicate, but it came out garbled.  Wife is awaiting test result.  She was very quiet.     checked in and offered support.    SH will continue to be available for support as desired.                                                                                                                                                    Ronel Mata M.Div., Psychiatric  Staff    Pager [phone]

## 2017-01-16 NOTE — PROGRESS NOTES
"[facility]  [facility]  Neuro-ICU Progress Note       Admission Date: 1/15/2017  Date of Service:2017   Hospital Day: 2   _________________________________     Main Plans for Today  --continue current care  --MRI brain   Assessment and Plan  #. (I61.0) Nontraumatic subcortical hemorrhage of left cerebral hemisphere (H)  (primary encounter diagnosis)  --2 cm left thalamic hemorrhage  --ICH score is 1  --MRI brain today  #. (I10) Malignant hypertension  --Keep BP <140 mm Hg  --Nicardipine as needed  #. Infection   --no issues  #. Endocrine:   --Insulin protocol to keep blood sugars 100-150.   #. Heme/Onc:   --stable anemia   #. Renal  --Sodium goal 140-155  #. Skin/Musculoskeletal:  --No issues   #. PT/OT/Speech  --start evaluations   #. Nutrition  --Per speech therapy evaluation   #. DVT prophylaxis:   --mechanical only due to hemorrhage      CODE STATUS: DNR / DNI        Interval History  Patient presented with speech difficulty and right sided weakness. Patient was normal when she went to bed the night before and was found confused with inappropriate speech in the morning. EMS was notified from staff at her rehab facility. CT head identified 2 cm left thalamic ICH without major MLS. Nicardipine was started for management of hypertension. Aphasia and right hemiplegia remain.    Physical Exam    Vitals: Blood pressure 122/57, temperature 102.1  F (38.9  C), temperature source Axillary, resp. rate 23, height 1.651 m (5' 5\"), weight 63.1 kg (139 lb 1.8 oz), SpO2 100 %.  Tmax: Temp (24hrs), Av  F (38.3  C), Min:97.9  F (36.6  C), Max:102.9  F (39.4  C)    Vital Signs with Ranges  Temp:  [97.9  F (36.6  C)-102.9  F (39.4  C)] 102.1  F (38.9  C)  Heart Rate:  [] 93  Resp:  [11-29] 23  BP: (120-200)/() 122/57 mmHg  SpO2:  [87 %-100 %] 100 %   I&O:  I/O this shift:  In: 50 [I.V.:50]  Out: -   I/O last 3 completed shifts:  In: .5 [I.V.:2682.5]  Out: 1570 " [Urine:1570] I/O since admission: +1162.5         General Appearance:   No acute distress  Neuro:       Mental Status Exam:   drowsy, arouses to voice, unable to assess orientation. Dysarthric, garbled speech. Mental status is normal       Cranial Nerves:  Pupils 3 mm, reactive. EOMI. Right facial weakness. Tongue and uvula are midline. Other CN are normal           Motor:  Right hemiplegia. Tone and bulk are normal            Reflexes:  Normal DTR.Toes downgoing.         Sensory:  Right hypoesthesia               Coordination:   Intact finger-to-nose on the left       Gait: untestable   Cardiovascular: Regular rate and rhythm, no m/r/g  Lungs: Resp: 23  Both hemithoraces are symmetrical, auscultation of lungs revealed no bronchovesicular sounds, expirium prolongation, wheezing, rhonci and crackles  Abdomen: Soft, not tender, not distended  Skin/Extremities: No clubbing, cyanosis, no edema       Medications  Infusions medications:    niCARdipine 40 mg in 200 mL 0.9% NaCl 10 mg/hr (01/16/17 0632)     NaCl 75 mL/hr at 01/16/17 0600     Schedule medications:    methylPREDNISolone  15 mg Intravenous Q24H     PRN medications:naloxone, HYDROmorphone, ondansetron  Data      Labotary Data:   All data was reviewed by me personally  CBC RESULTS:  Recent Labs   Lab Test  01/16/17 0445  01/15/17   0800 12/22/16 12/20/16   0949  12/19/16   0610  12/18/16   1640   WBC  5.4  4.2  3.0*  3.6*  4.3  5.2   RBC  2.90*  3.10*  2.63*  2.93*  2.10*  2.40*   HGB  9.2*  9.8*  8.5*  9.7*  7.0*  8.0*   HCT  28.1*  30.3*  26.1*  28.8*  21.1*  24.3*   PLT  230  225  164  117*  97*  109*     Basic Metabolic Panel:  Recent Labs   Lab Test  01/16/17   0445  01/15/17   0800 12/22/16 12/19/16   0610  12/18/16   0015  12/17/16   0530  12/15/16   2232   NA  143  138  137  143   --   143  144   POTASSIUM  4.1  3.9  3.5  3.9   --   4.5  3.5   CHLORIDE  111*  105  106  112*   --   111*  109   CO2  20  24  24  22   --   25  23   BUN  19  26  23  23    --   32*  29   CR  1.04  1.06*  0.99  0.93  1.22*  1.17*  1.33*   GLC  111*  85  107*  106*   --   147*  116*   ABELINO  8.2*  9.0  8.6  7.9*   --   7.4*  9.3     INR  Recent Labs   Lab Test  01/15/17   0800  12/15/16   2232   INR  1.06  1.02      Troponin I:   Recent Labs   Lab Test  01/15/17   0800   TROPI  <0.015  The 99th percentile for upper reference range is 0.045 ug/L.  Troponin values in   the range of 0.045 - 0.120 ug/L may be associated with risks of adverse   clinical events.       Most Recent 6 Bacteria Isolates From Any Culture (See EPIC Reports for Culture Details):  Recent Labs   Lab Test  01/15/17   2135  01/15/17   2130   CULT  No growth after 3 hours  No growth after 3 hours     UA Results:  Recent Labs   Lab Test  01/15/17   2100   10/27/16   1435   COLOR  Yellow   < >  Yellow   APPEARANCE  Clear   < >  Clear   URINEGLC  Negative   < >  Negative   URINEBILI  Negative   < >  Negative   URINEKETONE  5*   < >  Negative   SG  1.018   < >  <=1.005   UBLD  Moderate*   < >  Trace*   URINEPH  5.5   < >  6.0   PROTEIN  10*   < >  Negative   UROBILINOGEN   --    --   0.2   NITRITE  Negative   < >  Negative   LEUKEST  Trace*   < >  Negative   RBCU  11*   < >  O - 2   WBCU  4*   < >  O - 2    < > = values in this interval not displayed.            Imaging:   All imaging studies were reviewed personally  CT head 1/15/17:   1. Left thalamic parenchymal hemorrhage. Volume is approximately 3 cc. Mild mass effect on the third ventricle but no shift of midline structures.  2. Atrophy of the brain.  White matter changes consistent with small vessel ischemic disease.      CT angiography neck/head 1/15/17:  1. No occluded vessels or high-grade internal intracranial vascular stenosis.  2. Calcified plaque at the carotid bifurcations but no significant stenosis.  3. No arterial dissection identified.  4. 2 cm parenchymal hemorrhage in the left thalamic region.    MRI brain:   --pending        Time Spent on This Encounter      Time:   Neurocritical care time:  I spent 45 minutes bedside and on the inpatient unit today managing the neurocritical care of Michelle Lucio in relation to the issues listed in this note. Patient is critically ill      Nicky Garcia, JASPREETP-BC

## 2017-01-16 NOTE — PROGRESS NOTES
01/16/17 1456   Quick Adds   Type of Visit Initial PT Evaluation   Living Environment   Lives With spouse  (Caprice is pt's S.O.)   Living Arrangements condominium   Home Accessibility tub/shower is not walk in   Number of Stairs to Enter Home (elevator access)   Transportation Available family or friend will provide   Self-Care   Dominant Hand right   Usual Activity Tolerance fair   Current Activity Tolerance poor   Equipment Currently Used at Home walker, rolling;wheelchair;walker, tere   Activity/Exercise/Self-Care Comment Pt had been exercisinge and working with PTOT at [facility] just prior to admti   Functional Level Prior   Ambulation 1-->assistive equipment   Transferring 1-->assistive equipment   Toileting 0-->independent   Bathing 0-->independent   Dressing 0-->independent   Eating 0-->independent   Communication 0-->understands/communicates without difficulty   Swallowing 0-->swallows foods/liquids without difficulty   Cognition 0 - no cognition issues reported   Fall history within last six months yes   Number of times patient has fallen within last six months 1   Which of the above functional risks had a recent onset or change? ambulation;transferring;toileting;bathing;dressing;eating;swallowing;cognition;communication/speech  (Functional activity tolerance)   Prior Functional Level Comment At rehab the pt was able to get in/out of bed, able to sit<>stand and dress herself was supposed to be discharged from Rehab tomorrow at 2pm.   General Information   Onset of Illness/Injury or Date of Surgery - Date 01/15/17   Referring Physician Suzi Kinsey MD   Patient/Family Goals Statement None stated.   Pertinent History of Current Problem (include personal factors and/or comorbidities that impact the POC) 86 yo female adm with confusion and R sided weakness, found to have an ICH, pt had been rehabing at [facility] when symptems onset. [facility] s/p fall with ORIF s/p L ORIF following a fall causing  acute left intertrochanteric hip fracture discharged to [facility] 12/[facility]/16.   Precautions/Limitations fall precautions   General Observations L sided facial droop and drooling noted   General Info Comments Activity as tolerated; systolic <140   Cognitive Status Examination   Orientation person   Level of Consciousness lethargic/somnolent   Follows Commands and Answers Questions 25% of the time;able to follow single-step instructions   Personal Safety and Judgment impulsive;impaired   Cognitive Comment L UE incoordiantion/ataxia with pulling at lines/tubes   Integumentary/Edema   Integumentary/Edema Comments Puffy IV site, L forearm, notified RN.   Range of Motion (ROM)   ROM Comment Limited L LE AAROM 1 month Post ORIF at the hip following fall and fx;   Strength   Strength Comments Demonstrate very good strength of the L hand with , L LE limtied, but able to lift against gravity. lits head an dshoulders from the pillow in attempt to sit up.   Bed Mobility   Bed Mobility Comments Rolling L and R with elevated HR and systolic BP; therefore, repositioned after a few AROm only, no OOB for safety.   Coordination   Coordination Comments L UE appears purposefull, grasping at things; however, motion is incoordinated with clumbsy movement-ataxi   Muscle Tone   Muscle Tone Comments Incrased tone in the LEs withattempts at gentle stretches   General Therapy Interventions   Planned Therapy Interventions balance training;bed mobility training;gait training;neuromuscular re-education;ROM;strengthening;stretching;transfer training;home program guidelines;progressive activity/exercise   Clinical Impression   Criteria for Skilled Therapeutic Intervention yes, treatment indicated   PT Diagnosis Impaired functional activity tolerance   Influenced by the following impairments VS parameters, weakness R >L, confusion, limited speech, lethargy, ataxic movement sof the L UE   Functional limitations due to impairments  "Decreasedfunctional independence   Clinical Presentation Evolving/Changing   Clinical Presentation Rationale PLOF, current medical status, inablity to participate in performance measures, clinical observation and mobility status.   Clinical Decision Making (Complexity) Moderate complexity   Therapy Frequency` daily   Predicted Duration of Therapy Intervention (days/wks) 1 week   Anticipated Discharge Disposition Transitional Care Facility   Risk & Benefits of therapy have been explained Yes   Patient, Family & other staff in agreement with plan of care Yes   Clinical Impression Comments ? LTC vs TCU   [facility] AM-PAC TM \"6 Clicks\"   2016, Trustees of [facility], under license to [employer].  All rights reserved.   6 Clicks Short Forms Basic Mobility Inpatient Short Form   [facility] AM-PAC  \"6 Clicks\" V.2 Basic Mobility Inpatient Short Form   1. Turning from your back to your side while in a flat bed without using bedrails? 1 - Total   2. Moving from lying on your back to sitting on the side of a flat bed without using bedrails? 1 - Total   3. Moving to and from a bed to a chair (including a wheelchair)? 1 - Total   4. Standing up from a chair using your arms (e.g., wheelchair, or bedside chair)? 1 - Total   5. To walk in hospital room? 1 - Total   6. Climbing 3-5 steps with a railing? 1 - Total   Basic Mobility Raw Score (Score out of 24.Lower scores equate to lower levels of function) 6   Total Evaluation Time   Total Evaluation Time (Minutes) 10     "

## 2017-01-16 NOTE — PLAN OF CARE
Problem: Stroke (Hemorrhagic) (Adult)  Goal: Signs and Symptoms of Listed Potential Problems Will be Absent or Manageable (Stroke)  Signs and symptoms of listed potential problems will be absent or manageable by discharge/transition of care (reference Stroke (Hemorrhagic) (Adult) CPG).   Outcome: Therapy, unable to show any progress toward functional goals  Patient is drowsy.  Speech is garbled.  Able occ to say yes or now.  Right arm is out, Right leg can move slightly Right field cut.Purposeful with left arm, but does not always follow commands.

## 2017-01-17 NOTE — PLAN OF CARE
Problem: Goal Outcome Summary  Goal: Goal Outcome Summary  Outcome: No Change  Nicardipine gtt weaned off this am. Order received for prn labetolol and hydralazine for SBP >140.  Neuro's per flowsheet. MRI done this afternoon. Speech eval today. Caprice/SO at bedside - updated on plan of care. Cont to monitor.

## 2017-01-17 NOTE — PLAN OF CARE
Problem: Goal Outcome Summary  Goal: Goal Outcome Summary  SLP: Patient was seen for swallow treatment with her nurse present. She was upright and given trials of nectar thick liquids with a severe delay in her swallow response but no overt Sx of aspiration but given the delay high risk for silent aspiration. She tolerated the honey thick liquids by the spoon with continued delay but slightly more prompt. She demonstrated poor bolus control and AP transport with 1/2 teaspoon of apple sauce and mild oral residue that cleared with a teaspoon of honey thick liquids. She was able to count from 1-5 with a visual cue. She continues to be a high risk for aspiration. Per her nurse is more alert today and maybe appropriate for a diet or video swallow study in the next 24-48 hours. Recommend: 1. Continue NPO except for medications crushed in apple sauce and alternate with a teaspoon of honey thick liquids. Given 1/2 teaspoon amounts, verify each swallow and alternate liquid/solid. 2. SLP will see in the AM for PO readiness/video. 3. Discharge to TCU when stable.

## 2017-01-17 NOTE — PROGRESS NOTES
"[facility]  Sole Velez MD    1/17/2017     ASSESSMENT AN PLAN:    Non traumatic subcortical hemorrhage of the  Left cerebral hemisphere: Admitted to ICU.   ---- 2 cm left thalamic hemorrhage  --- Neurology consulted and followed. Input appreciated.  --- MRI of the brain on 1/16/17 showed slight increase of thalamic hemorrhage  --- Repeat CT today which showed slight increase of thalamic hemorrhage from the 1st CT and no change from the last MRI  --- PT/OT/SLP to follow  -- Diet per SLP      Malignant HTN: Only on metoprolol?   --- Keep BP <140/90  --- On Nicardipine drip to keep SBP <140 wean off as able  --- Continue  PRN Labetalol and hydralazin for SBP >140  --- Resume metoprolol      DVT: SCDs.      Disposition: Depends on the clinical course.  OK to transfer to neuro floor    Sole Velez MD  [facility]  Board certified,   Internal Medicine  Pager # [phone]  1/17/2017      SUBJECTIVE: Patient seen and examine. Garbled speech  New overnight event: None.     OBJECTIVE:    /71 mmHg  Temp(Src) 100  F (37.8  C) (Axillary)  Resp 18  Ht 1.651 m (5' 5\")  Wt 65.5 kg (144 lb 6.4 oz)  BMI 24.03 kg/m2  SpO2 96%     GENERAL:  NAD.   HEENT:  Head is normocephalic and atraumatic. PERRL. EOMI. No scleral icterus. No conjunctival erythema. No nasal discharge.  Moist mucous membranes. Pharynx unremarkable.  NECK:  Supple. Non-tender. No LAD or masses.  No carotid bruits.  LUNGS:  Clear to auscultation bilaterally. No wheezing, rhonchi or rales.   HEART:  RRR. S1 S2. No murmurs.  ABD:  Soft, ND, NT, + BS. No masses or HSM.   SKIN:  No ulcers, rashes or lesions.   EXT:  No calf tenderness. Pulses equal throughout.   MSK:  No deformities.  Neuro: Right facile drop. Garbled speech        Labs:    Most Recent 3 CBC's:  Recent Labs   Lab Test  01/17/17   0428  01/16/17   0445  01/15/17   0800   WBC  6.8  5.4  4.2   HGB  9.2*  9.2*  9.8*   MCV  " 97  97  98   PLT  207  230  225      Most Recent 3 BMP's:  Recent Labs   Lab Test  01/17/17   0428  01/16/17   0445  01/15/17   0800   NA  144  143  138   POTASSIUM  4.0  4.1  3.9   CHLORIDE  113*  111*  105   CO2  22  20  24   BUN  20  19  26   CR  0.97  1.04  1.06*   ANIONGAP  9  12  9   ABELINO  8.0*  8.2*  9.0   GLC  163*  111*  85     Most Recent 3 Troponin's:  Recent Labs   Lab Test  01/15/17   0800   TROPI  <0.015  The 99th percentile for upper reference range is 0.045 ug/L.  Troponin values in   the range of 0.045 - 0.120 ug/L may be associated with risks of adverse   clinical events.       Most Recent 3 INR's:  Recent Labs   Lab Test  01/15/17   0800  12/15/16   2232   INR  1.06  1.02     Most Recent 2 LFT's:No lab results found.  Most Recent Cholesterol Panel:No lab results found.  Most Recent 6 Bacteria Isolates From Any Culture (See EPIC Reports for Culture Details):  Recent Labs   Lab Test  01/15/17   2135  01/15/17   2130   CULT  No growth after 1 day  No growth after 1 day     Most Recent TSH, T4 and HgbA1c: No lab results found.    Prescriptions prior to admission   Medication Sig Dispense Refill Last Dose     Acetaminophen (TYLENOL PO) Take 650 mg by mouth every 6 hours as needed for mild pain or fever   prn     senna-docusate (SENOKOT-S;PERICOLACE) 8.6-50 MG per tablet Take 1 tablet by mouth 2 times daily   1/14/2017 at pm     TRAMADOL HCL PO Take 50 mg by mouth every 6 hours as needed for moderate to severe pain   prn     polyethylene glycol (MIRALAX/GLYCOLAX) powder Take 17 g by mouth daily as needed    prn     ACETAMINOPHEN PO Take 650 mg by mouth 3 times daily    1/14/2017 at pm     folic acid (FOLVITE) 1 MG tablet Take 1 tablet (1 mg) by mouth daily 30 tablet 0 1/14/2017 at am     aspirin 81 MG tablet Take 1 tablet (81 mg) by mouth daily 30 tablet  1/14/2017 at am     CYANOCOBALAMIN SL Place 500 mcg under the tongue daily   1/14/2017 at am     polyethylene glycol 0.4%- propylene glycol 0.3%  (SYSTANE ULTRA) 0.4-0.3 % SOLN ophthalmic solution Place 1 drop into both eyes every hour as needed for dry eyes   prn     metoprolol (TOPROL-XL) 25 MG 24 hr tablet Take 1 tablet (25 mg) by mouth daily   1/14/2017 at am     simvastatin (ZOCOR) 20 MG tablet Take 1 tablet (20 mg) by mouth At Bedtime   1/14/2017 at hs     predniSONE (DELTASONE) 5 MG tablet Take 5 mg by mouth daily    1/14/2017 at am     gabapentin (NEURONTIN) 300 MG capsule Take 300 mg by mouth At Bedtime    1/14/2017 at hs     allopurinol (ZYLOPRIM) 100 MG tablet Take 1 tablet (100 mg) by mouth daily   1/14/2017 at am     meclizine (ANTIVERT) 25 MG tablet Take 1 tablet (25 mg) by mouth every 6 hours as needed for dizziness   prn     raloxifene (EVISTA) 60 MG tablet Take 1 tablet (60 mg) by mouth daily   1/14/2017 at am     calcium carbonate (OS-ABELINO 600 MG [place]. CA) 1500 (600 CA) MG tablet Take 1 tablet (600 mg) by mouth 2 times daily   1/14/2017 at am     Multiple Vitamins-Minerals (CENTRUM SILVER) per tablet Take 1 tablet by mouth daily   1/14/2017 at am     omega 3 1000 MG CAPS Take 1 g by mouth daily   1/14/2017 at am       Scheduled medications:    methylPREDNISolone  15 mg Intravenous Q24H       I/O last 3 completed shifts:  In: 2179.8 [I.V.:2179.8]  Out: 1435 [Urine:1435]  Data  Data reviewed today:  I personally reviewed no images or EKG's today.    Recent Labs  Lab 01/17/17  0428 01/16/17  0445 01/15/17  0800   WBC 6.8 5.4 4.2   HGB 9.2* 9.2* 9.8*   MCV 97 97 98    230 225   INR  --   --  1.06    143 138   POTASSIUM 4.0 4.1 3.9   CHLORIDE 113* 111* 105   CO2 22 20 24   BUN 20 19 26   CR 0.97 1.04 1.06*   ANIONGAP 9 12 9   ABELINO 8.0* 8.2* 9.0   * 111* 85   TROPI  --   --  <0.015The 99th percentile for upper reference range is 0.045 ug/L.  Troponin values in the range of 0.045 - 0.120 ug/L may be associated with risks of adverse clinical events.       Recent Results (from the past 24 hour(s))   MR Brain w/o Contrast     Narrative    MRI BRAIN WITHOUT CONTRAST January 16, 2017 1:25 PM    HISTORY: Intracranial hemorrhage.    TECHNIQUE:  Multiplanar, multisequence MRI of the brain without  gadolinium IV contrast material.      COMPARISON: CT dated 1/15/2017.    FINDINGS: Exam is slightly limited by motion artifact. The hematoma  has expanded slightly during the interval as there is slightly more  mass effect on the fourth ventricle. The hematoma consistent with  acute and subacute components and is better measured on the MR study  where it measures approximately 3.0 x 2.3 x 1.6 cm in size. Mild  cerebral atrophy is present. Patchy white matter changes are seen in  both hemispheres. There is no evidence for any acute ischemic infarct.      Impression    IMPRESSION:  1. Left thalamic hematoma with some increase in size since 1/15/2017  resulting in slightly more mass effect on the third ventricle.  2. Cerebral atrophy with chronic white matter changes.    ADRIAN DE LA TORRE MD

## 2017-01-17 NOTE — PLAN OF CARE
Problem: Goal Outcome Summary  Goal: Goal Outcome Summary  PT: Pt much more alert today, repeats words, smiles and was able to assist with rolling L to don universal sling for bed>chair transfer. Pt much less resistive today, better able to control the L UE. Up in converter chair, seat belt in place, OT present upon PT exit. Note pt with continued R neglect, needs max cues to track past midline. Significant preference for left lateral lean in upright. Recommend return to rehab facility at discharge.

## 2017-01-17 NOTE — PLAN OF CARE
Problem: Goal Outcome Summary  Goal: Goal Outcome Summary  Outcome: Therapy, progress toward functional goals is gradual  OT: Initial evaluation complete, treatment started.  Helped PT with lift transfer to chair and then worked on following commands/ UE functional movements.  Pt gazing primarily to her left would come back to midline but not really past that.  No active movement of right arm or hand for grasp.  Right arm low to moderate tone, slight increase in biceps tone with quick ranging.  PROM is WFL.  Moving left arm WNL, needing some cues to start movement at times and needed cue to cross left arm all the way over to right shoulder.  Attempted some basic grooming tasks with left hand.  Pt likes to keep that hand busy, had pink foam block in it when not trying something else.  Initiated brushing teeth with left hand when toothbrush handed to her.  Some difficulty finding her mouth and took some time to start brushing.  Spontaneoously brought cup to mouth with left hand when it was handed to her.  Pt is below baseline noted at TCU, would benefit from skilled OT to increase right UE function and independence for ADLS. Anticipate return to TCU when medically stable.

## 2017-01-17 NOTE — PLAN OF CARE
Problem: Goal Outcome Summary  Goal: Goal Outcome Summary  Outcome: No Change  Patient tachycardiac at times. Nicardipine drip at 5mg/hr to maintain systolic pressure below 140. Patient on room air. Neuros unchanged, see flowsheet. Putting out adequate urine. Patient febrile throughout night, cooling measures applied. Continue plan of care.

## 2017-01-17 NOTE — PROGRESS NOTES
01/17/17 1026   Quick Adds   Type of Visit Initial Occupational Therapy Evaluation   Living Environment   Lives With significant other  (Caprice is the S.O.)   Living Arrangements condominium   Home Accessibility tub/shower is not walk in   Number of Stairs to Enter Home (elevator access in building)   Transportation Available family or friend will provide   Living Environment Comment pt was at TCU in rehab prior to last admission   Self-Care   Dominant Hand right   Usual Activity Tolerance fair   Current Activity Tolerance poor   Equipment Currently Used at Home walker, rolling   Activity/Exercise/Self-Care Comment working in PT/OT prior to last admission   Functional Level Prior   Ambulation 1-->assistive equipment   Transferring 1-->assistive equipment   Toileting 0-->independent   Bathing 0-->independent   Dressing 0-->independent   Eating 0-->independent   Communication 0-->understands/communicates without difficulty   Swallowing 0-->swallows foods/liquids without difficulty   Cognition 1 - attention or memory deficits   Fall history within last six months yes   Number of times patient has fallen within last six months 1   Which of the above functional risks had a recent onset or change? ambulation;transferring;bathing;dressing   Prior Functional Level Comment pt had good bed mobility and LE dressing prior to admission, about ready to DC home at time of admission   General Information   Onset of Illness/Injury or Date of Surgery - Date 01/15/17   Referring Physician Suzi Kinsey   Patient/Family Goals Statement not able to state   Additional Occupational Profile Info/Pertinent History of Current Problem Pt admitted from TCU where she was rehabing from a left intertrochanteric hip fracture.  Admitted with facial drop and aphasia, left hemisphere subcortical hemorrhage.  Right hemiparesis.   Precautions/Limitations fall precautions   General Observations Pt in bed working with PT, alert and willing to participate    Cognitive Status Examination   Orientation (unable to state)   Level of Consciousness alert   Able to Follow Commands moderate impairment   Personal Safety (Cognitive) severe impairment   Memory impaired   Attention Distractible during evaluation   Cognitive Comment pt has right side neglect/inattention   Visual Perception   Visual Perception No deficits were identified   Pain Assessment   Patient Currently in Pain No   Range of Motion (ROM)   ROM Quick Adds Other (describe)   ROM Comment pt has full PROM of right side, full AROM of left side   Coordination   Upper Extremity Coordination Right UE impaired   Fine Motor Coordination pt able to grab things and do some manipulation with left hand.  Not making any movements with right hand   Mobility   Bed Mobility Comments pt is currently a lift patient   Upper Body Dressing   Level of [place]: Dress Upper Body unable to perform   Lower Body Dressing   Level of [place]: Dress Lower Body unable to perform   Grooming   Level of [place]: Grooming dependent (less than 25% patients effort)   Activities of Daily Living Analysis   Impairments Contributing to Impaired Activities of Daily Living cognition impaired;motor control impaired;ROM decreased;strength decreased   General Therapy Interventions   Planned Therapy Interventions ADL retraining;cognition;neuromuscular re-education;strengthening   Clinical Impression   Criteria for Skilled Therapeutic Interventions Met yes, treatment indicated   OT Diagnosis decreased independence and use of right UE for ADLS   Influenced by the following impairments right sided weakness and hemiparesis, right sided neglect/fieldcut   Assessment of Occupational Performance 3-5 Performance Deficits   Identified Performance Deficits inability to dress, complete hygiene tasks, inabiliity to complete tub/toilet transfers. inability to communicate needs consistently   Clinical Decision Making (Complexity) Moderate complexity  "  Therapy Frequency 2 times/day   Predicted Duration of Therapy Intervention (days/wks) 7 days   Anticipated Discharge Disposition Transitional Care Facility   Risks and Benefits of Treatment have been explained. Yes   Patient, Family & other staff in agreement with plan of care Yes   [facility] TM \"6 Clicks\"   2016, Trustees of [facility], under license to [employer].  All rights reserved.   6 Clicks Short Forms Daily Activity Inpatient Short Form   [facility]  \"6 Clicks\" Daily Activity Inpatient Short Form   1. Putting on and taking off regular lower body clothing? 1 - Total   2. Bathing (including washing, rinsing, drying)? 1 - Total   3. Toileting, which includes using toilet, bedpan or urinal? 1 - Total   4. Putting on and taking off regular upper body clothing? 1 - Total   5. Taking care of personal grooming such as brushing teeth? 2 - A Lot   6. Eating meals? 1 - Total   Daily Activity Raw Score (Score out of 24.Lower scores equate to lower levels of function) 7   Total Evaluation Time   Total Evaluation Time (Minutes) 15     "

## 2017-01-17 NOTE — PROVIDER NOTIFICATION
Pt started on Metoprolol (po) this am. Nicardipine gtt weaned off. Pt requiring Labetolol and hydralazine prn for SBP >140. Dr. Velez notified. New order received for Hydralazine (po).  Cont to monitor.

## 2017-01-17 NOTE — PROGRESS NOTES
[facility]  Neuroscience and Sp[facility] [place]  Neurology Daily Note      Admission Date:1/15/2017   Date of service: 01/17/2017   Hospital Day: 3      Assessment and Plan  _______________________________  #. (I61.0) Nontraumatic subcortical hemorrhage of left cerebral hemisphere (H)  (primary encounter diagnosis)  --2 cm left thalamic hemorrhage  --ICH score is 1  --MRI brain with very slight increase in hemorrhage size  ----repeat CT head today  #. (I10) Malignant hypertension  --Keep BP <140 mm Hg  --on/off Nicardipine  #. PT/OT/Speech  --continue evaluations  #. Nutrition  --Per speech therapy evaluation   #. DVT Prophylaxis  --Mechanical only due to hemorrhage    Code Status: DNR/DNI    Likely can transfer to 73 after CT head    Interval History  _______________________________  Patient presented with speech difficulty and right sided weakness. Patient was normal when she went to bed the night before and was found confused with inappropriate speech in the morning. EMS was notified from staff at her rehab facility. CT head identified 2 cm left thalamic ICH without major MLS. Nicardipine was started for management of hypertension. Aphasia and right hemiplegia remain.    Review of Systems  _______________________________  Review of systems is not obtainable due to patient factors - aphasia   Physical Exam  _______________________________  Vitals: Temp: 100.4  F (38  C) Temp src: Axillary BP: 133/63 mmHg   Heart Rate: 91 Resp: 18 SpO2: 95 % O2 Device: None (Room air)    Vital Signs with Ranges: Temp:  [98.7  F (37.1  C)-101.6  F (38.7  C)] 100.4  F (38  C)  Heart Rate:  [] 91  Resp:  [8-51] 18  BP: (122-150)/(54-88) 133/63 mmHg  SpO2:  [92 %-100 %] 95 %    General Appearance:  No acute distress  Neuro:       Mental Status Exam:   drowsy, arouses to voice, unable to assess orientation. Dysarthric, garbled speech. Mental status is normal       Cranial Nerves:  Pupils 3 mm, reactive. EOMI. Right  visual neglect. Right facial weakness. Tongue and uvula are midline. Other CN are normal           Motor:  Right hemiplegia. Mild increased tone on the right arm       Reflexes:  Normal DTR.Toes downgoing.         Sensory:  Right hypoesthesia               Coordination:   Intact finger-to-nose on the left, unable to assess on the right due to weakness       Gait: untestable   Cardiovascular: Regular rate and rhythm, no m/r/g  Lungs: Clear to auscultation  Abdomen: Soft, not tender, not distended  Extremities: No clubbing, no cyanosis, no edema    Medications  _______________________________    niCARdipine 40 mg in 200 mL 0.9% NaCl 2.5 mg/hr (01/17/17 0701)     NaCl 75 mL/hr at 01/17/17 0440       methylPREDNISolone  15 mg Intravenous Q24H       Data  _______________________________      Lab Data:   All data was reviewed by me personally  CBC RESULTS:  Recent Labs   Lab Test  01/17/17   0428  01/16/17   0445  01/15/17   0800   WBC  6.8  5.4  4.2   RBC  2.90*  2.90*  3.10*   HGB  9.2*  9.2*  9.8*   HCT  28.1*  28.1*  30.3*   PLT  207  230  225     Basic Metabolic Panel:  Recent Labs   Lab Test  01/17/17   0428  01/16/17   0445  01/15/17   0800   NA  144  143  138   POTASSIUM  4.0  4.1  3.9   CHLORIDE  113*  111*  105   CO2  22  20  24   BUN  20  19  26   CR  0.97  1.04  1.06*   GLC  163*  111*  85   ABELINO  8.0*  8.2*  9.0     INR:  Recent Labs   Lab Test  01/15/17   0800  12/15/16   2232   INR  1.06  1.02      Troponin I:   Recent Labs   Lab Test  01/15/17   0800   TROPI  <0.015  The 99th percentile for upper reference range is 0.045 ug/L.  Troponin values in   the range of 0.045 - 0.120 ug/L may be associated with risks of adverse   clinical events.       UA Results:  Recent Labs   Lab Test  01/15/17   2100   10/27/16   1435   COLOR  Yellow   < >  Yellow   APPEARANCE  Clear   < >  Clear   URINEGLC  Negative   < >  Negative   URINEBILI  Negative   < >  Negative   URINEKETONE  5*   < >  Negative   SG  1.018   < >   <=1.005   UBLD  Moderate*   < >  Trace*   URINEPH  5.5   < >  6.0   PROTEIN  10*   < >  Negative   UROBILINOGEN   --    --   0.2   NITRITE  Negative   < >  Negative   LEUKEST  Trace*   < >  Negative   RBCU  11*   < >  O - 2   WBCU  4*   < >  O - 2    < > = values in this interval not displayed.          Imaging:   All imaging studies were reviewed personally  CT head 1/15/17:   1. Left thalamic parenchymal hemorrhage. Volume is approximately 3 cc. Mild mass effect on the third ventricle but no shift of midline structures.  2. Atrophy of the brain.  White matter changes consistent with small vessel ischemic disease.      CT angiography neck/head 1/15/17:  1. No occluded vessels or high-grade internal intracranial vascular stenosis.  2. Calcified plaque at the carotid bifurcations but no significant stenosis.  3. No arterial dissection identified.  4. 2 cm parenchymal hemorrhage in the left thalamic region.    MRI brain 1/16/17:   1. Left thalamic hematoma with some increase in size since 1/15/2017 resulting in slightly more mass effect on the third ventricle.  2. Cerebral atrophy with chronic white matter changes.        Nicky Garcia, FNP-BC

## 2017-01-18 NOTE — PLAN OF CARE
Problem: Goal Outcome Summary  Goal: Goal Outcome Summary  OT: Pt completed basic grooming tasks of face washing and hair care with min/mod A for thoroughness and accuracy of task. Pt continues to have limited ability to follow commands accurately. No active movement noted in RUE with stimulus. Per family member, pt fatigued from PT session this AM.  Recommend return to TCU when medically stable.    Due to pt's decreased tolerance, will decrease frequency of OT intervention from BID to QD.

## 2017-01-18 NOTE — PLAN OF CARE
Problem: Goal Outcome Summary  Goal: Goal Outcome Summary  Outcome: No Change  Could no control pt's BP with PRN's, started back on Nicardipine gtt at 2.5mg/hr. Lungs clear. Q4 neuros intact and unchanged. Right sided weakness and left facial droop. Speech garbled but able to say a few words. Good urine output. Sodium trending up. Pt's wife updated during the evening shift. Continue plan of care.

## 2017-01-18 NOTE — PLAN OF CARE
Problem: Goal Outcome Summary  Goal: Goal Outcome Summary  SLP:  Tx session cx, Pt sleeping soundly and not able to participate in Tx.  Pt's spouse present who agreed with cx Tx for this a.m.  If Pt is more alert this afternoon, SLP will attempt to see pending schedule, otherwise speech will follow tomorrow.

## 2017-01-18 NOTE — PROGRESS NOTES
"[facility]  Neuroscience and Spine [place]  Neuro-ICU Progress Note       Admission Date: 1/15/2017  Date of Service:2017   Hospital Day: 4   _________________________________     Main Plans for Today  --continue current care  --BP management  Assessment and Plan  #. 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 (I10) Malignant hypertension  --Keep BP <140 mm Hg  --Nicardipine as needed  #. Infection   --no issues  #. Endocrine:   --Insulin protocol to keep blood sugars 100-150.   #. Heme/Onc:   --stable anemia   #. Renal  --Sodium goal 140-155  #. Skin/Musculoskeletal:  --No issues   #. PT/OT/Speech  --continue evaluations   #. Nutrition  --Per speech therapy evaluation   #. DVT prophylaxis:   --mechanical only due to hemorrhage      CODE STATUS: DNR / DNI        Interval History  Patient presented with speech difficulty and right sided weakness. Patient was normal when she went to bed the night before and was found confused with inappropriate speech in the morning. EMS was notified from staff at her rehab facility. CT head identified 2 cm left thalamic ICH without major MLS. Nicardipine was started for management of hypertension. Aphasia and right hemiplegia remain. BP still elevated requiring nicardipine.     Physical Exam    Vitals: Blood pressure 135/65, temperature 98.6  F (37  C), temperature source Oral, resp. rate 14, height 1.651 m (5' 5\"), weight 65.5 kg (144 lb 6.4 oz), SpO2 100 %.  Tmax: Temp (24hrs), Av  F (38.3  C), Min:97.9  F (36.6  C), Max:102.9  F (39.4  C)    Vital Signs with Ranges  Temp:  [98.6  F (37  C)-100.1  F (37.8  C)] 98.6  F (37  C)  Heart Rate:  [75-99] 89  Resp:  [10-24] 14  BP: (126-156)/(61-80) 135/65 mmHg  SpO2:  [93 %-100 %] 100 %   I&O:  I/O this " shift:  In: 150 [I.V.:150]  Out: 90 [Urine:90]  I/O last 3 completed shifts:  In: 1950.21 [P.O.:150; I.V.:1800.21]  Out: 2585 [Urine:2585] I/O since admission: +1282.51         General Appearance:   No acute distress  Neuro:       Mental Status Exam:   drowsy, arouses to voice, unable to assess orientation. Dysarthric, garbled speech. Mental status is normal       Cranial Nerves:  Pupils 3 mm, reactive. EOMI. Right visual neglect. Right facial weakness. Tongue and uvula are midline. Other CN are normal           Motor:  Right hemiplegia. Mild increased tone on the right arm       Reflexes:  Normal DTR.Toes downgoing.         Sensory:  Right hypoesthesia               Coordination:   Intact finger-to-nose on the left, unable to assess on the right due to weakness       Gait: untestable   Cardiovascular: Regular rate and rhythm, no m/r/g  Lungs: Resp: 14  Both hemithoraces are symmetrical, auscultation of lungs revealed no bronchovesicular sounds, expirium prolongation, wheezing, rhonci and crackles  Abdomen: Soft, not tender, not distended  Skin/Extremities: No clubbing, cyanosis, no edema       Medications  Infusions medications:    niCARdipine 40 mg in 200 mL 0.9% NaCl 2.5 mg/hr (01/18/17 0213)     NaCl 75 mL/hr at 01/17/17 1652     Schedule medications:    metoprolol  25 mg Oral BID     hydrALAZINE  25 mg Oral 4x Daily     methylPREDNISolone  15 mg Intravenous Q24H     PRN medications:hydrALAZINE, labetalol, naloxone, HYDROmorphone, ondansetron  Data      Labotary Data:   All data was reviewed by me personally  CBC RESULTS:  Recent Labs   Lab Test  01/18/17   0420  01/17/17   0428  01/16/17   0445  01/15/17   0800 12/22/16 12/20/16   0949   WBC  8.0  6.8  5.4  4.2  3.0*  3.6*   RBC  2.98*  2.90*  2.90*  3.10*  2.63*  2.93*   HGB  9.4*  9.2*  9.2*  9.8*  8.5*  9.7*   HCT  28.9*  28.1*  28.1*  30.3*  26.1*  28.8*   PLT  204  207  230  225  164  117*     Basic Metabolic Panel:  Recent Labs   Lab Test  01/18/17    0420  01/17/17   0428  01/16/17   0445  01/15/17   0800 12/22/16 12/19/16   0610   NA  146*  144  143  138  137  143   POTASSIUM  3.9  4.0  4.1  3.9  3.5  3.9   CHLORIDE  115*  113*  111*  105  106  112*   CO2  21  22  20  24  24  22   BUN  18  20  19  26  23  23   CR  0.81  0.97  1.04  1.06*  0.99  0.93   GLC  142*  163*  111*  85  107*  106*   ABELINO  7.8*  8.0*  8.2*  9.0  8.6  7.9*     INR  Recent Labs   Lab Test  01/15/17   0800  12/15/16   2232   INR  1.06  1.02      Troponin I:   Recent Labs   Lab Test  01/15/17   0800   TROPI  <0.015  The 99th percentile for upper reference range is 0.045 ug/L.  Troponin values in   the range of 0.045 - 0.120 ug/L may be associated with risks of adverse   clinical events.       Most Recent 6 Bacteria Isolates From Any Culture (See EPIC Reports for Culture Details):  Recent Labs   Lab Test  01/15/17   2135  01/15/17   2130   CULT  No growth after 2 days  No growth after 2 days     UA Results:  Recent Labs   Lab Test  01/15/17   2100   10/27/16   1435   COLOR  Yellow   < >  Yellow   APPEARANCE  Clear   < >  Clear   URINEGLC  Negative   < >  Negative   URINEBILI  Negative   < >  Negative   URINEKETONE  5*   < >  Negative   SG  1.018   < >  <=1.005   UBLD  Moderate*   < >  Trace*   URINEPH  5.5   < >  6.0   PROTEIN  10*   < >  Negative   UROBILINOGEN   --    --   0.2   NITRITE  Negative   < >  Negative   LEUKEST  Trace*   < >  Negative   RBCU  11*   < >  O - 2   WBCU  4*   < >  O - 2    < > = values in this interval not displayed.            Imaging:   All imaging studies were reviewed personally  CT head 1/15/17:   1. Left thalamic parenchymal hemorrhage. Volume is approximately 3 cc. Mild mass effect on the third ventricle but no shift of midline structures.  2. Atrophy of the brain.  White matter changes consistent with small vessel ischemic disease.      CT angiography neck/head 1/15/17:  1. No occluded vessels or high-grade internal intracranial vascular stenosis.  2.  Calcified plaque at the carotid bifurcations but no significant stenosis.  3. No arterial dissection identified.  4. 2 cm parenchymal hemorrhage in the left thalamic region.    MRI brain 1/16/17:   1. Left thalamic hematoma with some increase in size since 1/15/2017 resulting in slightly more mass effect on the third ventricle.  2. Cerebral atrophy with chronic white matter changes.    CT head 1/17/17:  1. Slight increase in the size of the left thalamic hemorrhage since the CT of 1/15/2017. No significant change since the MR scan of 1/16/2017. No significant or shift of midline structures.  2. Atrophy of the brain. White matter changes consistent with small vessel ischemic disease.      Time Spent on This Encounter     Time:   Neurocritical care time:  I spent 45 minutes bedside and on the inpatient unit today managing the neurocritical care of Michelle Lucio in relation to the issues listed in this note. Patient is critically ill      Nicky Garcia, FNP-BC

## 2017-01-18 NOTE — PLAN OF CARE
Problem: Goal Outcome Summary  Goal: Goal Outcome Summary  Outcome: Improving  Pt started on metoprolol (po) this am. Nicardipine gtt weaned off. Pt requiring labetolol and hydralazine prn SBP >140. MD notified and pt started on hydralazine (po) this afternoon. CT done this am. Pt verbalizing more this afternoon. Family at bedside - updated throughout the day. Cont to monitor.

## 2017-01-18 NOTE — PROGRESS NOTES
SPIRITUAL HEALTH SERVICES  Spiritual Assessment Progress Note  [facility] ICU   had a brief visit with pt's partner.  She appeared weary and named that he self care is not the best.  SH encouraged partner to take time off as needed. Provided support.       will continue to be available for support as needed.                                                                                                                                                   Ronel Mata M.Div., [facility]  Staff    Pager [phone]

## 2017-01-18 NOTE — PLAN OF CARE
Problem: Goal Outcome Summary  Goal: Goal Outcome Summary  PT: Pt participated in bed mobility and transfers, up to chair via universal sling and portable lift. Did attempt to stand, unable to fully stand, required Max A x 2 to trial. Sitting balance CGA to Max A at times, tends to push from the L. Continues to demonstrate no activity in the R UE, purposeful but very weak on the R LE. Recommend TCU at discharge, if pt continues to progress could be an Acute Rehab candidate.

## 2017-01-18 NOTE — PLAN OF CARE
Problem: Individualization  Goal: Patient Preferences  Pt alert, most of the times doesnot follow command ( some neuro assessment hard to assess). Right facial droop unchanged, continues to remain hemiplegic right side, RUE flaccid but pt grimaces to pain. Right leg pt moves occasionally, at times speak yes , speech garbled and incomprehensible most of the time. VSS, SR, afebrile. On RA sats 100%. LS dim. Skin intact. Cruz with good UOP. Turned every  2 hours, pt was up in the chair for hour and half this morning. SO at the bedside updated about POC, Hydralazine PO dose increased by provider with prn labetalol and hydralazine for goal SBP <140. PT/OT and Speech following, pt NPO, crushed meds with honey thick water or applesauce. Delayed swallowing, MIV at 75ml/hr continued. SBAR given to RN station 73. All belongings sent with pt. Pt transferred via cart.

## 2017-01-18 NOTE — PROGRESS NOTES
"[facility]    Hospitalist Progress note  Sole Velez MD    1/18/2017     ASSESSMENT AN PLAN:    Non traumatic subcortical hemorrhage of the  Left cerebral hemisphere: Admitted to ICU.   ---- 2 cm left thalamic hemorrhage  --- Neurology consulted and followed. Input appreciated.  --- MRI of the brain on 1/16/17 showed slight increase of thalamic hemorrhage  --- Repeat CT today which showed slight increase of thalamic hemorrhage from the 1st CT and no change from the last MRI  --- PT/OT/SLP to follow  -- Diet per SLP      Malignant HTN: Only on metoprolol?   --- Keep BP <140/90  --- On Nicardipine drip to keep SBP <140 wean off as able  --- Started on Hydralazin PO 25 mg TID and titrate up dose to a goal BP <140/90  --- Continue  PRN Labetalol and hydralazin for SBP >140  --- Resume metoprolol      DVT: SCDs.      Disposition: Depends on the clinical course.  OK to transfer to neuro floor    Sole Velez MD  [facility]  Board certified,   Internal Medicine  Pager # [phone]  1/18/2017      SUBJECTIVE: Patient seen and examine. Able to say some words.   New overnight event: None.     OBJECTIVE:    /68 mmHg  Temp(Src) 98.6  F (37  C) (Oral)  Resp 11  Ht 1.651 m (5' 5\")  Wt 65.5 kg (144 lb 6.4 oz)  BMI 24.03 kg/m2  SpO2 100%     GENERAL:  NAD.   HEENT:  Head is normocephalic and atraumatic. PERRL. EOMI. No scleral icterus. No conjunctival erythema. No nasal discharge.  Moist mucous membranes. Pharynx unremarkable.  NECK:  Supple. Non-tender. No LAD or masses.  No carotid bruits.  LUNGS:  Clear to auscultation bilaterally. No wheezing, rhonchi or rales.   HEART:  RRR. S1 S2. No murmurs.  ABD:  Soft, ND, NT, + BS. No masses or HSM.   SKIN:  No ulcers, rashes or lesions.   EXT:  No calf tenderness. Pulses equal throughout.   MSK:  No deformities.  Neuro: Right facial drop. dysarthria        Labs:    Most Recent 3 CBC's:  Recent Labs   Lab Test  01/18/17   6570  " 01/17/17   0428  01/16/17   0445   WBC  8.0  6.8  5.4   HGB  9.4*  9.2*  9.2*   MCV  97  97  97   PLT  204  207  230      Most Recent 3 BMP's:  Recent Labs   Lab Test  01/18/17   0420  01/17/17   0428  01/16/17   0445   NA  146*  144  143   POTASSIUM  3.9  4.0  4.1   CHLORIDE  115*  113*  111*   CO2  21  22  20   BUN  18  20  19   CR  0.81  0.97  1.04   ANIONGAP  10  9  12   ABELINO  7.8*  8.0*  8.2*   GLC  142*  163*  111*     Most Recent 3 Troponin's:  Recent Labs   Lab Test  01/15/17   0800   TROPI  <0.015  The 99th percentile for upper reference range is 0.045 ug/L.  Troponin values in   the range of 0.045 - 0.120 ug/L may be associated with risks of adverse   clinical events.       Most Recent 3 INR's:  Recent Labs   Lab Test  01/15/17   0800  12/15/16   2232   INR  1.06  1.02     Most Recent 2 LFT's:No lab results found.  Most Recent Cholesterol Panel:No lab results found.  Most Recent 6 Bacteria Isolates From Any Culture (See EPIC Reports for Culture Details):  Recent Labs   Lab Test  01/15/17   2135  01/15/17   2130   CULT  No growth after 2 days  No growth after 2 days     Most Recent TSH, T4 and HgbA1c: No lab results found.    Prescriptions prior to admission   Medication Sig Dispense Refill Last Dose     Acetaminophen (TYLENOL PO) Take 650 mg by mouth every 6 hours as needed for mild pain or fever   prn     senna-docusate (SENOKOT-S;PERICOLACE) 8.6-50 MG per tablet Take 1 tablet by mouth 2 times daily   1/14/2017 at pm     TRAMADOL HCL PO Take 50 mg by mouth every 6 hours as needed for moderate to severe pain   prn     polyethylene glycol (MIRALAX/GLYCOLAX) powder Take 17 g by mouth daily as needed    prn     ACETAMINOPHEN PO Take 650 mg by mouth 3 times daily    1/14/2017 at pm     folic acid (FOLVITE) 1 MG tablet Take 1 tablet (1 mg) by mouth daily 30 tablet 0 1/14/2017 at am     aspirin 81 MG tablet Take 1 tablet (81 mg) by mouth daily 30 tablet  1/14/2017 at am     CYANOCOBALAMIN SL Place 500 mcg under  the tongue daily   1/14/2017 at am     polyethylene glycol 0.4%- propylene glycol 0.3% (SYSTANE ULTRA) 0.4-0.3 % SOLN ophthalmic solution Place 1 drop into both eyes every hour as needed for dry eyes   prn     metoprolol (TOPROL-XL) 25 MG 24 hr tablet Take 1 tablet (25 mg) by mouth daily   1/14/2017 at am     simvastatin (ZOCOR) 20 MG tablet Take 1 tablet (20 mg) by mouth At Bedtime   1/14/2017 at hs     predniSONE (DELTASONE) 5 MG tablet Take 5 mg by mouth daily    1/14/2017 at am     gabapentin (NEURONTIN) 300 MG capsule Take 300 mg by mouth At Bedtime    1/14/2017 at hs     allopurinol (ZYLOPRIM) 100 MG tablet Take 1 tablet (100 mg) by mouth daily   1/14/2017 at am     meclizine (ANTIVERT) 25 MG tablet Take 1 tablet (25 mg) by mouth every 6 hours as needed for dizziness   prn     raloxifene (EVISTA) 60 MG tablet Take 1 tablet (60 mg) by mouth daily   1/14/2017 at am     calcium carbonate (OS-ABELINO 600 MG [place]. CA) 1500 (600 CA) MG tablet Take 1 tablet (600 mg) by mouth 2 times daily   1/14/2017 at am     Multiple Vitamins-Minerals (CENTRUM SILVER) per tablet Take 1 tablet by mouth daily   1/14/2017 at am     omega 3 1000 MG CAPS Take 1 g by mouth daily   1/14/2017 at am       Scheduled medications:    hydrALAZINE  50 mg Oral 4x Daily     metoprolol  25 mg Oral BID     methylPREDNISolone  15 mg Intravenous Q24H       I/O last 3 completed shifts:  In: 1950.21 [P.O.:150; I.V.:1800.21]  Out: 2585 [Urine:2585]  Data  Data reviewed today:  I personally reviewed no images or EKG's today.    Recent Labs  Lab 01/18/17  0420 01/17/17  0428 01/16/17  0445 01/15/17  0800   WBC 8.0 6.8 5.4 4.2   HGB 9.4* 9.2* 9.2* 9.8*   MCV 97 97 97 98    207 230 225   INR  --   --   --  1.06   * 144 143 138   POTASSIUM 3.9 4.0 4.1 3.9   CHLORIDE 115* 113* 111* 105   CO2 21 22 20 24   BUN 18 20 19 26   CR 0.81 0.97 1.04 1.06*   ANIONGAP 10 9 12 9   ABELINO 7.8* 8.0* 8.2* 9.0   * 163* 111* 85   TROPI  --   --   --  <0.015The  99th percentile for upper reference range is 0.045 ug/L.  Troponin values in the range of 0.045 - 0.120 ug/L may be associated with risks of adverse clinical events.       No results found for this or any previous visit (from the past 24 hour(s)).

## 2017-01-19 NOTE — PLAN OF CARE
Problem: Goal Outcome Summary  Goal: Goal Outcome Summary  PT: RN aware of BP of 156/72 at start of PT session, RN stated patient is okay to participate in therapy session with close monitoring of BP throughout session. Patient agreeable to participate in therapy session. Patient required moderate assist of 2 to complete supine-sit with 1-step cues for sequencing. Noted patient initially required moderate assist for sitting balance, improving to contact guard-minimum assist. Noted BP of 112/39 after sitting EOB x 5 minutes, assisted patient back to supine and RN notified. PT Recommendation: Patient would benefit from ARU.

## 2017-01-19 NOTE — PLAN OF CARE
Problem: Goal Outcome Summary  Goal: Goal Outcome Summary  Outcome: No Change  BPs elevated, given PRN and scheduled BP meds with improvement to 138/64. Tele NSR. Unable to assess orientation, speech garbled. Does not follow most commands. R facial droop, R side neglect. RUE flaccid, weak hand grasp on L. Pt moves RLE but does not follow commands to assess strength. Bedrest. Turned/repositioned with Ax2. Cruz patent. NS 75 cc/hr. NPO pending swallow eval. PO meds given crushed with honey thick fluids.

## 2017-01-19 NOTE — PLAN OF CARE
Problem: Goal Outcome Summary  Goal: Goal Outcome Summary  OT: Pt participated well in neuro re-education exercises to address R UE weakness, incoordination, and neglect. Following set up, pt complete hygiene task seated in bed with min A. Hypertension (XZ=418/97) noted with RN addressing throughout session. Pt eager to participate in OT. Recommend ARU upon discharge.

## 2017-01-19 NOTE — PROGRESS NOTES
[facility]  Neuroscience and Spine [place]  Neurology Daily Note      Admission Date:1/15/2017   Date of service: 01/19/2017   Hospital Day: 5      Assessment and Plan  _______________________________  #. 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 (I10) Malignant hypertension  --Keep BP <140 mm Hg  --off Nicardipine  --management per hospitalist  #. PT/OT/Speech  --continue evaluations  #. Nutrition  --Per speech therapy evaluation   #. DVT Prophylaxis  --Mechanical only due to hemorrhage    Code Status: DNR/DNI      Interval History  _______________________________  Patient presented with speech difficulty and right sided weakness. Patient was normal when she went to bed the night before and was found confused with inappropriate speech in the morning. EMS was notified from staff at her rehab facility. CT head identified 2 cm left thalamic ICH without major MLS.  BP still elevated. Aphasia mildly improving - able to get out some words.    Review of Systems  _______________________________  Review of systems is limited by patient factors - aphasia   Physical Exam  _______________________________  Vitals: Temp: 98.3  F (36.8  C) Temp src: Oral BP: 157/84 mmHg   Heart Rate: 81 Resp: 18 SpO2: 99 % O2 Device: None (Room air)    Vital Signs with Ranges: Temp:  [97  F (36.1  C)-98.8  F (37.1  C)] 98.3  F (36.8  C)  Heart Rate:  [71-90] 81  Resp:  [11-25] 18  BP: (129-168)/(59-89) 157/84 mmHg  SpO2:  [67 %-100 %] 99 %    General Appearance:  No acute distress  Neuro:       Mental Status Exam:   drowsy, arouses to voice, unable to assess orientation. Dysarthric, aphasic, but able to get  out some words. Mental status is decreased       Cranial Nerves:  Pupils 3 mm, reactive. EOMI. Right visual neglect. Right facial weakness. Tongue and uvula are midline. Other CN are normal           Motor:  Right hemiplegia. Mild increased tone on the right arm       Reflexes:  Normal DTR.Toes downgoing.         Sensory:  Right hypoesthesia               Coordination:   Intact finger-to-nose on the left, unable to assess on the right due to weakness       Gait: untestable   Cardiovascular: Regular rate and rhythm, no m/r/g  Lungs: Clear to auscultation  Abdomen: Soft, not tender, not distended  Extremities: No clubbing, no cyanosis, no edema    Medications  _______________________________    niCARdipine 40 mg in 200 mL 0.9% NaCl Stopped (01/18/17 0945)     NaCl 75 mL/hr at 01/19/17 0311       hydrALAZINE  50 mg Oral 4x Daily     lisinopril  5 mg Oral Daily     metoprolol  25 mg Oral BID     methylPREDNISolone  15 mg Intravenous Q24H       Data  _______________________________      Lab Data:   All data was reviewed by me personally  CBC RESULTS:  Recent Labs   Lab Test  01/19/17   0733  01/18/17   0420  01/17/17   0428   WBC  6.9  8.0  6.8   RBC  3.04*  2.98*  2.90*   HGB  9.6*  9.4*  9.2*   HCT  29.5*  28.9*  28.1*   PLT  209  204  207     Basic Metabolic Panel:  Recent Labs   Lab Test  01/19/17   0733  01/18/17   0420  01/17/17   0428   NA  148*  146*  144   POTASSIUM  3.4  3.9  4.0   CHLORIDE  117*  115*  113*   CO2  20  21  22   BUN  24  18  20   CR  0.85  0.81  0.97   GLC  126*  142*  163*   ABELINO  8.5  7.8*  8.0*     INR:  Recent Labs   Lab Test  01/15/17   0800  12/15/16   2232   INR  1.06  1.02      Troponin I:   Recent Labs   Lab Test  01/15/17   0800   TROPI  <0.015  The 99th percentile for upper reference range is 0.045 ug/L.  Troponin values in   the range of 0.045 - 0.120 ug/L may be associated with risks of adverse   clinical events.       UA Results:  Recent Labs   Lab Test  01/15/17   2100   10/27/16    1435   COLOR  Yellow   < >  Yellow   APPEARANCE  Clear   < >  Clear   URINEGLC  Negative   < >  Negative   URINEBILI  Negative   < >  Negative   URINEKETONE  5*   < >  Negative   SG  1.018   < >  <=1.005   UBLD  Moderate*   < >  Trace*   URINEPH  5.5   < >  6.0   PROTEIN  10*   < >  Negative   UROBILINOGEN   --    --   0.2   NITRITE  Negative   < >  Negative   LEUKEST  Trace*   < >  Negative   RBCU  11*   < >  O - 2   WBCU  4*   < >  O - 2    < > = values in this interval not displayed.          Imaging:   All imaging studies were reviewed personally  CT head 1/15/17:   1. Left thalamic parenchymal hemorrhage. Volume is approximately 3 cc. Mild mass effect on the third ventricle but no shift of midline structures.  2. Atrophy of the brain.  White matter changes consistent with small vessel ischemic disease.      CT angiography neck/head 1/15/17:  1. No occluded vessels or high-grade internal intracranial vascular stenosis.  2. Calcified plaque at the carotid bifurcations but no significant stenosis.  3. No arterial dissection identified.  4. 2 cm parenchymal hemorrhage in the left thalamic region.    MRI brain 1/16/17:   1. Left thalamic hematoma with some increase in size since 1/15/2017 resulting in slightly more mass effect on the third ventricle.  2. Cerebral atrophy with chronic white matter changes.    CT head 1/17/17:  1. Slight increase in the size of the left thalamic hemorrhage since the CT of 1/15/2017. No significant change since the MR scan of 1/16/2017. No significant or shift of midline structures.  2. Atrophy of the brain. White matter changes consistent with small vessel ischemic disease.      Nicky Garcia, FNP-BC

## 2017-01-19 NOTE — PROVIDER NOTIFICATION
Web page to hospitalist regarding pts elevated BP above  parameters. PRN meds given- no relief. Asked for tylenol to manage pain as pt grimaces at times. Awaiting call back/orders

## 2017-01-19 NOTE — PLAN OF CARE
Problem: Goal Outcome Summary  Goal: Goal Outcome Summary  Outcome: No Change  BPs elevated above 140/90 parameter, given PRN and scheduled BP meds with no improvement (most recent /80). Hospitalist increased lisinopril and hydralazine this morning. Tele NSR. Unable to assess orientation, speech garbled. Does always follow commands. R facial droop, R side neglect. RUE flaccid, mod hand grasp on L. Pt moves RLE but does not follow commands to assess strength. Weak dorsi/plantar flexion. Sat at EOB with therapy. Up with lift. Turned/repositioned with Ax2. Cruz removed. NS 75 cc/hr. Nectar thick liquid diet. Planning d/c to TCU when BPs improved.

## 2017-01-19 NOTE — PLAN OF CARE
"Problem: Goal Outcome Summary  Goal: Goal Outcome Summary  Outcome: No Change  Pt alert, has aphasia. Able to nod and say \"ok\" occasionally. Has R droop, R neglect, and right sided weakness. Does not follow all commands for neuro assessment. SBP in 140-160's, given scheduled and PRN hydralazine as well as labetalol. SBP improved to 138/84. Tele SD. Takes pills crushed in honey thickened liquids, otherwise NPO. T/R. Anthony in. Denies pain. D/C to TCU pending stability, will continue to monitor.          "

## 2017-01-19 NOTE — PLAN OF CARE
Problem: Goal Outcome Summary  Goal: Goal Outcome Summary  SLP: Pt seen for dysphagia treatment. Tolerated honey thick liquids by spoon - no s/s of aspiration noted. However, pt unable to follow command to use double swallow. Pt's swallow noted to be less delayed today- pt was also more awake and alert. Recommendations: Honey thick liquids with spoon, safe swallow strategies including upright positioning, small tastes, and feed only when alert. Verbal/tactile cues for double swallow. VFSS for further assessment of pharyngeal function prior to upgrading diet. Cont POC. D/C ARU.

## 2017-01-19 NOTE — PROGRESS NOTES
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   ---- 2 cm left thalamic hemorrhage  --- Neurology consulted and followed. Input appreciated.  --- MRI of the brain on 1/16/17 showed slight increase of thalamic hemorrhage  --- Repeat CT today which showed slight increase of thalamic hemorrhage from the 1st CT and no change from the last MRI  --- PT/OT/SLP to follow  -- Diet per SLP      Malignant HTN: Only on metoprolol?   --- Off Nicardipine drip  --- Started on Hydralazin PO and titrate up dose to a goal BP <140/90  --- Lisinopril 5 mg added and will increase to 10 mg daily  --- Continue  PRN Labetalol and hydralazin for SBP >140  --- Resume metoprolol      DVT: SCDs.      Disposition: ARU once able to control SBP with po meds      Sole Velez MD  [facility]  Board certified,   Internal Medicine  Pager # [phone]  1/19/2017      SUBJECTIVE: Patient seen and examine. Working with PT  New overnight event: None.     OBJECTIVE:    /72 mmHg  Temp(Src) 98.3  F (36.8  C) (Oral)  Resp 18  Ht 1.651 m (5' 5\")  Wt 54.9 kg (121 lb 0.5 oz)  BMI 20.14 kg/m2  SpO2 99%     GENERAL:  NAD.   HEENT:  Head is normocephalic and atraumatic. PERRL. EOMI. No scleral icterus. No conjunctival erythema. No nasal discharge.  Moist mucous membranes. Pharynx unremarkable.  NECK:  Supple. Non-tender. No LAD or masses.  No carotid bruits.  LUNGS:  Clear to auscultation bilaterally. No wheezing, rhonchi or rales.   HEART:  RRR. S1 S2. No murmurs.  ABD:  Soft, ND, NT, + BS. No masses or HSM.   SKIN:  No ulcers, rashes or lesions.   EXT:  No calf tenderness. Pulses equal throughout.   MSK:  No deformities.  Neuro: Right facial drop. dysarthria        Labs:    Most Recent 3 CBC's:  Recent Labs   Lab Test  01/19/17   0733  01/18/17   0420  01/17/17   0428 "   WBC  6.9  8.0  6.8   HGB  9.6*  9.4*  9.2*   MCV  97  97  97   PLT  209  204  207      Most Recent 3 BMP's:  Recent Labs   Lab Test  01/19/17   0733  01/18/17   0420  01/17/17   0428   NA  148*  146*  144   POTASSIUM  3.4  3.9  4.0   CHLORIDE  117*  115*  113*   CO2  20  21  22   BUN  24  18  20   CR  0.85  0.81  0.97   ANIONGAP  11  10  9   ABELINO  8.5  7.8*  8.0*   GLC  126*  142*  163*     Most Recent 3 Troponin's:  Recent Labs   Lab Test  01/15/17   0800   TROPI  <0.015  The 99th percentile for upper reference range is 0.045 ug/L.  Troponin values in   the range of 0.045 - 0.120 ug/L may be associated with risks of adverse   clinical events.       Most Recent 3 INR's:  Recent Labs   Lab Test  01/15/17   0800  12/15/16   2232   INR  1.06  1.02     Most Recent 2 LFT's:No lab results found.  Most Recent Cholesterol Panel:No lab results found.  Most Recent 6 Bacteria Isolates From Any Culture (See EPIC Reports for Culture Details):  Recent Labs   Lab Test  01/15/17   2135  01/15/17   2130   CULT  No growth after 3 days  No growth after 3 days     Most Recent TSH, T4 and HgbA1c: No lab results found.    Prescriptions prior to admission   Medication Sig Dispense Refill Last Dose     Acetaminophen (TYLENOL PO) Take 650 mg by mouth every 6 hours as needed for mild pain or fever   prn     senna-docusate (SENOKOT-S;PERICOLACE) 8.6-50 MG per tablet Take 1 tablet by mouth 2 times daily   1/14/2017 at pm     TRAMADOL HCL PO Take 50 mg by mouth every 6 hours as needed for moderate to severe pain   prn     polyethylene glycol (MIRALAX/GLYCOLAX) powder Take 17 g by mouth daily as needed    prn     ACETAMINOPHEN PO Take 650 mg by mouth 3 times daily    1/14/2017 at pm     folic acid (FOLVITE) 1 MG tablet Take 1 tablet (1 mg) by mouth daily 30 tablet 0 1/14/2017 at am     aspirin 81 MG tablet Take 1 tablet (81 mg) by mouth daily 30 tablet  1/14/2017 at am     CYANOCOBALAMIN SL Place 500 mcg under the tongue daily   1/14/2017 at am      polyethylene glycol 0.4%- propylene glycol 0.3% (SYSTANE ULTRA) 0.4-0.3 % SOLN ophthalmic solution Place 1 drop into both eyes every hour as needed for dry eyes   prn     metoprolol (TOPROL-XL) 25 MG 24 hr tablet Take 1 tablet (25 mg) by mouth daily   1/14/2017 at am     simvastatin (ZOCOR) 20 MG tablet Take 1 tablet (20 mg) by mouth At Bedtime   1/14/2017 at hs     predniSONE (DELTASONE) 5 MG tablet Take 5 mg by mouth daily    1/14/2017 at am     gabapentin (NEURONTIN) 300 MG capsule Take 300 mg by mouth At Bedtime    1/14/2017 at hs     allopurinol (ZYLOPRIM) 100 MG tablet Take 1 tablet (100 mg) by mouth daily   1/14/2017 at am     meclizine (ANTIVERT) 25 MG tablet Take 1 tablet (25 mg) by mouth every 6 hours as needed for dizziness   prn     raloxifene (EVISTA) 60 MG tablet Take 1 tablet (60 mg) by mouth daily   1/14/2017 at am     calcium carbonate (OS-ABELINO 600 MG [place]. CA) 1500 (600 CA) MG tablet Take 1 tablet (600 mg) by mouth 2 times daily   1/14/2017 at am     Multiple Vitamins-Minerals (CENTRUM SILVER) per tablet Take 1 tablet by mouth daily   1/14/2017 at am     omega 3 1000 MG CAPS Take 1 g by mouth daily   1/14/2017 at am       Scheduled medications:    hydrALAZINE  100 mg Oral 4x Daily     [START ON 1/20/2017] lisinopril  10 mg Oral Daily     lisinopril  5 mg Oral Once     metoprolol  25 mg Oral BID     methylPREDNISolone  15 mg Intravenous Q24H       I/O last 3 completed shifts:  In: 1662.17 [I.V.:1662.17]  Out: 1510 [Urine:1510]  Data  Data reviewed today:  I personally reviewed no images or EKG's today.    Recent Labs  Lab 01/19/17  0733 01/18/17  0420 01/17/17  0428  01/15/17  0800   WBC 6.9 8.0 6.8  < > 4.2   HGB 9.6* 9.4* 9.2*  < > 9.8*   MCV 97 97 97  < > 98    204 207  < > 225   INR  --   --   --   --  1.06   * 146* 144  < > 138   POTASSIUM 3.4 3.9 4.0  < > 3.9   CHLORIDE 117* 115* 113*  < > 105   CO2 20 21 22  < > 24   BUN 24 18 20  < > 26   CR 0.85 0.81 0.97  < > 1.06*    ANIONGAP 11 10 9  < > 9   ABELINO 8.5 7.8* 8.0*  < > 9.0   * 142* 163*  < > 85   TROPI  --   --   --   --  <0.015The 99th percentile for upper reference range is 0.045 ug/L.  Troponin values in the range of 0.045 - 0.120 ug/L may be associated with risks of adverse clinical events.   < > = values in this interval not displayed.    No results found for this or any previous visit (from the past 24 hour(s)).

## 2017-01-19 NOTE — PROVIDER NOTIFICATION
"Call placed to Dr. Velez: \"Patient's SBP sustained greater than 140, too early for prn or scheduled medication.  Current B/P 159/81, HR 80, please advise, thanks!\" Awaiting return call.    Received orders for Clonidine 0.1mg q4h prn SBP >140.  "

## 2017-01-20 NOTE — PLAN OF CARE
Problem: Goal Outcome Summary  Goal: Goal Outcome Summary  PT: Patient resting in bed upon arrival. /72 at rest, RN notified. RN requesting to hold PT until RN can assess/give medications if needed. Will reschedule for this afternoon as appropriate.

## 2017-01-20 NOTE — PLAN OF CARE
Problem: Goal Outcome Summary  Goal: Goal Outcome Summary  OT: Pt leaving room at time of attempt for OT intervention.

## 2017-01-20 NOTE — PLAN OF CARE
Problem: Goal Outcome Summary  Goal: Goal Outcome Summary  Outcome: No Change  L ICH, spontaneous. OT, PT. Lethargic unable to assess orientation d/t aphasia, follows some direction with assessment. R facial droop, RUE hemiplegia, RLE hemiparesis, R field cut. CMS intact. LS clear. BSx4, incontinent of urine. Bedrest this shift, repositioned Q2 hrs. Clear, honey thick liquid diet. Blood pressure continues to be difficult to control, several PRN and scheduled meds given, most recent check within parameters. Discharge pending.

## 2017-01-20 NOTE — PROGRESS NOTES
[facility]  Neuroscience and Spine [place]  Neurology Daily Note      Admission Date:1/15/2017   Date of service: 01/20/2017   Hospital Day: 6      Assessment and Plan  _______________________________  #. 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 (I10) Malignant hypertension  --Keep BP <140 mm Hg  --management per hospitalist  #. PT/OT/Speech  --continue evaluations  #. Nutrition  --Per speech therapy evaluation   #. DVT Prophylaxis  --Mechanical only due to hemorrhage    Code Status: DNR/DNI     Will sign off. Follow up in 4-6 weeks in clinic after imaging.       Interval History  _______________________________  Patient presented with speech difficulty and right sided weakness. Patient was normal when she went to bed the night before and was found confused with inappropriate speech in the morning. EMS was notified from staff at her rehab facility. CT head identified 2 cm left thalamic ICH without major MLS.  BP still elevated. Aphasia mildly improving - able to get out some words. Denies headache.     Review of Systems  _______________________________  Review of systems is limited by patient factors - aphasia   Physical Exam  _______________________________  Vitals: Temp: 97.7  F (36.5  C) Temp src: Oral BP: 158/82 mmHg   Heart Rate: 74 Resp: 16 SpO2: 95 % O2 Device: None (Room air)    Vital Signs with Ranges: Temp:  [97.5  F (36.4  C)-98.2  F (36.8  C)] 97.7  F (36.5  C)  Heart Rate:  [68-84] 74  Resp:  [12-18] 16  BP: (112-177)/(39-91) 158/82 mmHg  SpO2:  [94 %-97 %] 95 %    General Appearance:  No acute distress  Neuro:       Mental Status Exam:  Awake and alert.   Dysarthric, aphasic, but able to get out some words. Mental status is normal       Cranial Nerves:  Pupils 3 mm, reactive. EOMI. Right visual neglect. Right facial weakness. Tongue and uvula are midline. Other CN are normal           Motor:  Right hemiplegia. Mild increased tone on the right arm       Reflexes:  Normal DTR.Toes downgoing.         Sensory:  Right hypoesthesia               Coordination:   Intact finger-to-nose on the left, unable to assess on the right due to weakness       Gait: untestable   Cardiovascular: Regular rate and rhythm, no m/r/g  Lungs: Clear to auscultation  Abdomen: Soft, not tender, not distended  Extremities: No clubbing, no cyanosis, no edema    Medications  _______________________________       cloNIDine  0.1 mg Oral TID     hydrALAZINE  100 mg Oral 4x Daily     lisinopril  10 mg Oral Daily     gabapentin  300 mg Oral At Bedtime     metoprolol  25 mg Oral BID     methylPREDNISolone  15 mg Intravenous Q24H       Data  _______________________________      Lab Data:   All data was reviewed by me personally  CBC RESULTS:  Recent Labs   Lab Test  01/20/17   0820  01/19/17   0733  01/18/17   0420   WBC  4.4  6.9  8.0   RBC  2.97*  3.04*  2.98*   HGB  9.3*  9.6*  9.4*   HCT  28.8*  29.5*  28.9*   PLT  176  209  204     Basic Metabolic Panel:  Recent Labs   Lab Test  01/20/17   0820  01/19/17   0733  01/18/17   0420   NA  147*  148*  146*   POTASSIUM  3.4  3.4  3.9   CHLORIDE  116*  117*  115*   CO2  20  20  21   BUN  28  24  18   CR  0.96  0.85  0.81   GLC  104*  126*  142*   ABELINO  8.5  8.5  7.8*     INR:  Recent Labs   Lab Test  01/15/17   0800  12/15/16   2232   INR  1.06  1.02      Troponin I:   Recent Labs   Lab Test  01/15/17   0800   TROPI  <0.015  The 99th percentile for upper reference range is 0.045 ug/L.  Troponin values in   the range of 0.045 - 0.120 ug/L may be associated with risks of adverse   clinical events.       UA Results:  Recent Labs   Lab Test  01/15/17   2100    10/27/16   1435   COLOR  Yellow   < >  Yellow   APPEARANCE  Clear   < >  Clear   URINEGLC  Negative   < >  Negative   URINEBILI  Negative   < >  Negative   URINEKETONE  5*   < >  Negative   SG  1.018   < >  <=1.005   UBLD  Moderate*   < >  Trace*   URINEPH  5.5   < >  6.0   PROTEIN  10*   < >  Negative   UROBILINOGEN   --    --   0.2   NITRITE  Negative   < >  Negative   LEUKEST  Trace*   < >  Negative   RBCU  11*   < >  O - 2   WBCU  4*   < >  O - 2    < > = values in this interval not displayed.          Imaging:   All imaging studies were reviewed personally  CT head 1/15/17:   1. Left thalamic parenchymal hemorrhage. Volume is approximately 3 cc. Mild mass effect on the third ventricle but no shift of midline structures.  2. Atrophy of the brain.  White matter changes consistent with small vessel ischemic disease.      CT angiography neck/head 1/15/17:  1. No occluded vessels or high-grade internal intracranial vascular stenosis.  2. Calcified plaque at the carotid bifurcations but no significant stenosis.  3. No arterial dissection identified.  4. 2 cm parenchymal hemorrhage in the left thalamic region.    MRI brain 1/16/17:   1. Left thalamic hematoma with some increase in size since 1/15/2017 resulting in slightly more mass effect on the third ventricle.  2. Cerebral atrophy with chronic white matter changes.    CT head 1/17/17:  1. Slight increase in the size of the left thalamic hemorrhage since the CT of 1/15/2017. No significant change since the MR scan of 1/16/2017. No significant or shift of midline structures.  2. Atrophy of the brain. White matter changes consistent with small vessel ischemic disease.

## 2017-01-20 NOTE — PLAN OF CARE
Problem: Goal Outcome Summary  Goal: Goal Outcome Summary  Outcome: No Change  Pt alert, MATTY orientation d/t aphasia. BP elevated (150s-160s) - received prn antihypertensives x3 and scheduled oral hydralazine. /64 at 1845. VS otherwise stable. Tele NSR. Neuros unchanged with R facial droop, R neglect, RUE hemiplegia, RLE hemiparesis and expressive aphasia. CMS appears intact. Lung sounds clear. Honey thick liquid diet. Incontinent of urine. T/R q 2 hrs.

## 2017-01-20 NOTE — PROGRESS NOTES
"[facility]    Hospitalist Progress note  Sole Velez MD    1/120/2017     ASSESSMENT AN PLAN:    Non traumatic subcortical hemorrhage of the  Left cerebral hemisphere: Admitted to ICU.   ---- 2 cm left thalamic hemorrhage  --- Neurology consulted and followed. Input appreciated.  --- MRI of the brain on 1/16/17 showed slight increase of thalamic hemorrhage  --- Repeat CT today which showed slight increase of thalamic hemorrhage from the 1st CT and no change from the last MRI  --- PT/OT/SLP to follow  -- Diet per SLP  -- Video swallow study today      Malignant HTN: Only on metoprolol?   --- Off Nicardipine drip  --- titrated up Hydralazin dose to 100 mg QID  --- Increase Lisinopril to 10 mg daily.  ----Use  PRN Clonidine 0.1 mg QID for SBP >140 before using prn IV meds  --- Continue  PRN Labetalol and hydralazin for SBP >140  --- Resumed PTA metoprolol      DVT: SCDs.      Disposition: ARU once able to control SBP with po meds      Sole Velez MD  [facility]  Board certified,   Internal Medicine  Pager # [phone]  1/20/2017      SUBJECTIVE: New overnight event: None.     OBJECTIVE:    /82 mmHg  Temp(Src) 97.7  F (36.5  C) (Oral)  Resp 16  Ht 1.651 m (5' 5\")  Wt 55.8 kg (123 lb 0.3 oz)  BMI 20.47 kg/m2  SpO2 95%     GENERAL:  NAD.   HEENT:  Head is normocephalic and atraumatic. PERRL. EOMI. No scleral icterus. No conjunctival erythema. No nasal discharge.  Moist mucous membranes. Pharynx unremarkable.  NECK:  Supple. Non-tender. No LAD or masses.  No carotid bruits.  LUNGS:  Clear to auscultation bilaterally. No wheezing, rhonchi or rales.   HEART:  RRR. S1 S2. No murmurs.  ABD:  Soft, ND, NT, + BS. No masses or HSM.   SKIN:  No ulcers, rashes or lesions.   EXT:  No calf tenderness. Pulses equal throughout.   MSK:  No deformities.  Neuro: Right facial drop. dysarthria        Labs:    Most Recent 3 CBC's:  Recent Labs   Lab Test  01/20/17   0820  01/19/17   " 0733  01/18/17   0420   WBC  4.4  6.9  8.0   HGB  9.3*  9.6*  9.4*   MCV  97  97  97   PLT  176  209  204      Most Recent 3 BMP's:  Recent Labs   Lab Test  01/19/17   0733  01/18/17   0420  01/17/17   0428   NA  148*  146*  144   POTASSIUM  3.4  3.9  4.0   CHLORIDE  117*  115*  113*   CO2  20  21  22   BUN  24  18  20   CR  0.85  0.81  0.97   ANIONGAP  11  10  9   ABELINO  8.5  7.8*  8.0*   GLC  126*  142*  163*     Most Recent 3 Troponin's:  Recent Labs   Lab Test  01/15/17   0800   TROPI  <0.015  The 99th percentile for upper reference range is 0.045 ug/L.  Troponin values in   the range of 0.045 - 0.120 ug/L may be associated with risks of adverse   clinical events.       Most Recent 3 INR's:  Recent Labs   Lab Test  01/15/17   0800  12/15/16   2232   INR  1.06  1.02     Most Recent 2 LFT's:No lab results found.  Most Recent Cholesterol Panel:No lab results found.  Most Recent 6 Bacteria Isolates From Any Culture (See EPIC Reports for Culture Details):  Recent Labs   Lab Test  01/15/17   2135  01/15/17   2130   CULT  No growth after 4 days  No growth after 4 days     Most Recent TSH, T4 and HgbA1c: No lab results found.    Prescriptions prior to admission   Medication Sig Dispense Refill Last Dose     Acetaminophen (TYLENOL PO) Take 650 mg by mouth every 6 hours as needed for mild pain or fever   prn     senna-docusate (SENOKOT-S;PERICOLACE) 8.6-50 MG per tablet Take 1 tablet by mouth 2 times daily   1/14/2017 at pm     TRAMADOL HCL PO Take 50 mg by mouth every 6 hours as needed for moderate to severe pain   prn     polyethylene glycol (MIRALAX/GLYCOLAX) powder Take 17 g by mouth daily as needed    prn     ACETAMINOPHEN PO Take 650 mg by mouth 3 times daily    1/14/2017 at pm     folic acid (FOLVITE) 1 MG tablet Take 1 tablet (1 mg) by mouth daily 30 tablet 0 1/14/2017 at am     aspirin 81 MG tablet Take 1 tablet (81 mg) by mouth daily 30 tablet  1/14/2017 at am     CYANOCOBALAMIN SL Place 500 mcg under the tongue  daily   1/14/2017 at am     polyethylene glycol 0.4%- propylene glycol 0.3% (SYSTANE ULTRA) 0.4-0.3 % SOLN ophthalmic solution Place 1 drop into both eyes every hour as needed for dry eyes   prn     metoprolol (TOPROL-XL) 25 MG 24 hr tablet Take 1 tablet (25 mg) by mouth daily   1/14/2017 at am     simvastatin (ZOCOR) 20 MG tablet Take 1 tablet (20 mg) by mouth At Bedtime   1/14/2017 at hs     predniSONE (DELTASONE) 5 MG tablet Take 5 mg by mouth daily    1/14/2017 at am     gabapentin (NEURONTIN) 300 MG capsule Take 300 mg by mouth At Bedtime    1/14/2017 at hs     allopurinol (ZYLOPRIM) 100 MG tablet Take 1 tablet (100 mg) by mouth daily   1/14/2017 at am     meclizine (ANTIVERT) 25 MG tablet Take 1 tablet (25 mg) by mouth every 6 hours as needed for dizziness   prn     raloxifene (EVISTA) 60 MG tablet Take 1 tablet (60 mg) by mouth daily   1/14/2017 at am     calcium carbonate (OS-ABELINO 600 MG [place]. CA) 1500 (600 CA) MG tablet Take 1 tablet (600 mg) by mouth 2 times daily   1/14/2017 at am     Multiple Vitamins-Minerals (CENTRUM SILVER) per tablet Take 1 tablet by mouth daily   1/14/2017 at am     omega 3 1000 MG CAPS Take 1 g by mouth daily   1/14/2017 at am       Scheduled medications:    cloNIDine  0.1 mg Oral TID     hydrALAZINE  100 mg Oral 4x Daily     lisinopril  10 mg Oral Daily     gabapentin  300 mg Oral At Bedtime     metoprolol  25 mg Oral BID     methylPREDNISolone  15 mg Intravenous Q24H       I/O last 3 completed shifts:  In: 240 [P.O.:240]  Out: 480 [Urine:480]  Data  Data reviewed today:  I personally reviewed no images or EKG's today.    Recent Labs  Lab 01/20/17  0820 01/19/17  0733 01/18/17  0420 01/17/17  0428  01/15/17  0800   WBC 4.4 6.9 8.0 6.8  < > 4.2   HGB 9.3* 9.6* 9.4* 9.2*  < > 9.8*   MCV 97 97 97 97  < > 98    209 204 207  < > 225   INR  --   --   --   --   --  1.06   NA  --  148* 146* 144  < > 138   POTASSIUM  --  3.4 3.9 4.0  < > 3.9   CHLORIDE  --  117* 115* 113*  < > 105    CO2  --  20 21 22  < > 24   BUN  --  24 18 20  < > 26   CR  --  0.85 0.81 0.97  < > 1.06*   ANIONGAP  --  11 10 9  < > 9   ABELINO  --  8.5 7.8* 8.0*  < > 9.0   GLC  --  126* 142* 163*  < > 85   TROPI  --   --   --   --   --  <0.015The 99th percentile for upper reference range is 0.045 ug/L.  Troponin values in the range of 0.045 - 0.120 ug/L may be associated with risks of adverse clinical events.   < > = values in this interval not displayed.    No results found for this or any previous visit (from the past 24 hour(s)).

## 2017-01-20 NOTE — PROGRESS NOTES
01/20/17 1100   General Information   Onset Date 01/15/17   Start of Care Date 01/15/17   Referring Physician Dr. Kinsey   Patient Profile Review/OT: Additional Occupational Profile Info See Profile for full history and prior level of function   Patient/Family Goals Statement None stated as pt with expressive and receptive aphasia.    Swallowing Evaluation Videofluoroscopic evaluation   Behaviorial Observations Alert   Mode of current nutrition NPO   Comments Pt admitted with intracranial hemorrhage, left thalamic parenchymal hemorrhage. Clinical swallow evaluation completed 1/15/17 with subsequent daily treatment to assess for PO readines. VFSS completed to further assess oral and pharyngeal swallowing function and to rule out silent aspiration.    VFSS Evaluation   VFSS Additional Documentation Yes   VFSS Eval: Radiology   Radiologist CHRISTIANO DE LA PAZ MD   Views Taken left lateral   Physical Location of Procedure [facility]   VFSS Eval: Thin Liquid Texture Trial   Mode of Presentation, Thin Liquid spoon;cup   Order of Presentation 1, 2, 3, 6, 8   Preparatory Phase WFL   Oral Phase, Thin Liquid Premature pharyngeal entry  (to pyriform sinuses)   Pharyngeal Phase, Thin Liquid Delayed swallow reflex;Residue in valleculae   Rosenbek's Penetration Aspiration Scale: Thin Liquid Trial Results 2 - contrast enters airway, remains above the vocal cords, no residue remains (penetration)   Successful Strategies Trialed During Procedure, Thin Liquid other (see comments)  (subsequent dry swallow)   Diagnostic Statement Poor bolus control with delayed swallow response. Effective airway protection with single epsidoe of flash penetration. Valleuclar residue clears when cued to use subsequent dry swallow.    VFSS Eval: Nectar Thick Liquid Texture Trial   Mode of Presentation, Nectar cup   Order of Presentation 4   Preparatory Phase WFL   Oral Phase, Nectar Premature pharyngeal entry  (to valleculae)   Pharyngeal Phase, Nectar Delayed  swallow reflex;Residue in valleculae   Rosenbek's Penetration Aspiration Scale: Nectar-Thick Liquid Trial Results 1 - no aspiration, contrast does not enter airway   Diagnostic Statement Improved bolus control and timing of swallow response compared to thin liquids. No penetration or aspiration.    VFSS Eval: Puree Solid Texture Trial   Mode of Presentation, Puree spoon   Order of Presentation 5   Preparatory Phase WFL   Oral Phase, Puree Premature pharyngeal entry;other (see comments)  (mildly reduced bolus formation)   Pharyngeal Phase, Puree Delayed swallow reflex  (mild)   Rosenbek's Penetration Aspiration Scale: Puree Food Trial Results 1 - no aspiration, contrast does not enter airway   Diagnostic Statement Mildly delayed swallow response with effective airway protection. No oral or pharyngeal residue.    VFSS Eval: Solid Food Texture Trial   Mode of Presentation, Solid self-fed   Order of Presentation 7   Preparatory Phase Insufficient mastication;Poor bolus control  (slow mastication)   Oral Phase, Solid Residue in oral cavity;other (see comments)  (reduced bolus formation)   Pharyngeal Phase, Solid WFL   Rosenbek's Penetration Aspiration Scale: Solid Food Trial Results 1 - no aspiration, contrast does not enter airway   Successful Strategies Trialed During Procedure, Solid other (see comments)  (oral residue clears with subsequent dry swallow)   Diagnostic Statement Slow mastication with reduced bolus formation and mild oral residue. Prompt swallow response with effective airway protection.    Swallow Compensations   Swallow Compensations Alternate viscosity of consistencies;Multiple swallow   Results No difficulties noted   Esophageal Phase of Swallow   Patient reports or presents with symptoms of esophageal dysphagia No   General Therapy Interventions   Planned Therapy Interventions Dysphagia Treatment   Dysphagia treatment Oropharyngeal exercise training;Modified diet education;Instruction of safe  swallow strategies;Compensatory strategies for swallowing   Swallow Eval: Clinical Impressions   Skilled Criteria for Therapy Intervention Skilled criteria met.  Treatment indicated.   Dysphagia Outcome Severity Scale (JORGE) Level 4 - JORGE   Treatment Diagnosis Mild-moderate oropharyngela dysphagia   Diet texture recommendations Dysphagia diet level 2;Nectar thick liquids   Recommended Feeding/Eating Techniques alternate between small bites and sips of food/liquid;small sips/bites  (double swallow )   Therapy Frequency daily   Predicted Duration of Therapy Intervention (days/wks) 1 week   Anticipated Discharge Disposition inpatient rehabilitation facility   Risks and Benefits of Treatment have been explained. Yes   Patient, family and/or staff in agreement with Plan of Care Yes   Clinical Impression Comments Video swallow study completed this AM. Pt demonstrates mild-moderate oral and pharyngeal dysphagia. She exhibits slow mastication of solid textures, poor bolus formation with solid textures, and premature spillage over the base of tongue with all liquid consistencies. Thin liquids noted to spill to pyriform sinuses whereas nectar-thick liquids only spill to valleculae. Swallow response consistently delayed to level valleculae. Occasional flash penetration noted with thin liquids; no penetration with nectar-thick liquids. No aspiration seen on today's exam. Mild oral residue with solid textures; mild-mod vallecular residue with thin and nectar-thick liquids. Pt benefits from verbal cue to complete a subsequent dry swallow which clears oral and pharyngeal residue. Given results of today's exam, pt is at increased risk of aspiration with thin liquids. Recommend initiate dysphagia diet level 2 and nectar-thick liquids by cup. Swallowing precautions include supervision with verbal cues for use of strategies, sit up at 90 degrees, small bites/sips, alternate solids/liquids, swallow 2x per bite/sip, slow pace, regular  oral cares. Recommend meds given with teaspoon of nectar-thick liquids or puree. SLP will continue to follow to assess tolerance of diet/appropriateness for diet upgrade.    Total Evaluation Time   Total Evaluation Time (Minutes) 15

## 2017-01-20 NOTE — PROGRESS NOTES
[facility] admissions: Care coordinator and SW request liaison meet with patient to determine if she meets criteria. Met with patient and spoke with patient's partner, Caprice, via the phone. Caprice reports that she is available 24/7 to supervise the patient and she is able to perform minimal lifting if needed. They live in a condo without stairs. Caprice reports prior to admit to the hospital this time, the patient was nearly ready to DC home from TCU after fractured hip and was able to walk independently with FWW, using WC for longer distances. Pt and partner state they are open to [facility]. Educated Caprice on criteria, location of unit, and goals of return to community living following rehabilitation. Caprice states they are motivated to return home following rehab, but open to alternate plans if needed.     Thank you for the referral, we will continue to follow this patient for post acute placement.     Determination of admission is based upon the patient's need for an intensive, interdisciplinary approach to rehabilitation, their ability to progress, their ability to tolerate intensive therapies, their need for daily physician supervision, their need for twenty four hour nursing assistance, and their ability and willingness to participate in such a program.    Kamlesh Ramos CM  Rehab Liaison/  [facility]  1/20/2017    3:47 PM

## 2017-01-20 NOTE — PLAN OF CARE
Problem: Goal Outcome Summary  Goal: Goal Outcome Summary  Outcome: No Change  Lethargic. Aphasic, MATTY orientation. R facial droop, R field cut, RUE hemiplegia, RLE hemiparesis. Tele NSR. Denies pain. Repositioned every 2 hours. Incontinent of urine. Up with lift. Video swallow completed.Tolerating DD2 with nectar thick liquids, poor appetite. Pills crushed and put in thickened liquids. BP difficult to control throughout afternoon, schedules and PRN medications given, SBP remained >140 (see MAR and vital sign flowsheet), MD aware and awaiting orders. Continue to monitor.

## 2017-01-20 NOTE — PLAN OF CARE
Problem: Goal Outcome Summary  Goal: Goal Outcome Summary  SLP: Video swallow study completed this AM. Pt demonstrates mild-moderate oral and pharyngeal dysphagia. She exhibits slow mastication of solid textures, poor bolus formation with solid textures, and premature spillage over the base of tongue with all liquid consistencies. Thin liquids noted to spill to pyriform sinuses whereas nectar-thick liquids only spill to valleculae. Swallow response consistently delayed to level valleculae. Occasional flash penetration noted with thin liquids; no penetration with nectar-thick liquids. No aspiration seen on today's exam. Mild oral residue with solid textures; mild-mod vallecular residue with thin and nectar-thick liquids. Pt benefits from verbal cue to complete a subsequent dry swallow which clears oral and pharyngeal residue. Given results of today's exam, pt is at increased risk of aspiration with thin liquids. Recommend initiate dysphagia diet level 2 and nectar-thick liquids by cup. Swallowing precautions include supervision with verbal cues for use of strategies, sit up at 90 degrees, small bites/sips, alternate solids/liquids, swallow 2x per bite/sip, slow pace, regular oral cares. Recommend meds given with teaspoon of nectar-thick liquids or puree. SLP will continue to follow to assess tolerance of diet/appropriateness for diet upgrade.

## 2017-01-21 NOTE — PLAN OF CARE
Problem: Goal Outcome Summary  Goal: Goal Outcome Summary  Outcome: No Change  Pt repositioned every 2 hours.  Inc of urine.  Poor appetite, c/o stomach pain.  Resolved on own.  BP elevated diltiazem added.  Improved to 139/73  Pills crushed in applesauce.   Expressive aphasia, R UE hemiplegia, R LE hemiparesis, R field cut, R facial droop.  Able to answer questions with a few words and yes/no  Tele- NSR.

## 2017-01-21 NOTE — PLAN OF CARE
Problem: Goal Outcome Summary  Goal: Goal Outcome Summary  Outcome: No Change  Lethargic, MATTY orientation d/t aphasia. R sided facial droop and R sided field cut, RUE hemiplegia RLE hemiparesis. VSS, /82, PO catapres to 143/79, PRN IV Apresoline given . Tele NSR. DD2 w/ NT diet. Up with 2 asst/ lift. Denies pain, no nonverbal indicators present. Plan poss d/c to ARU .

## 2017-01-21 NOTE — PROGRESS NOTES
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 Input appreciated.  --- MRI of the brain on 1/16/17 showed slight increase of thalamic hemorrhage  --- Repeat CT today which showed slight increase of thalamic hemorrhage from the 1st CT and no change from the last MRI  --- PT/OT/SLP to follow  -- Diet per SLP  -- Video swallow study today      Malignant HTN: Only on metoprolol?   --- Off Nicardipine drip  --- titrated up Hydralazin dose to 100 mg QID  --- Cardizem 30 mg QID was added and discontinue metoprolol.  --- Increase Lisinopril to 20 mg daily and give one dose of 10 mg now  ----Use  PRN Clonidine 0.1 mg QID for SBP >140 before using prn IV meds  --- Continue  PRN Labetalol and hydralazin for SBP >140      DVT: SCDs.      Disposition: ARU once able to control SBP with po meds      Sole Velez MD  [facility]  Board certified,   Internal Medicine  Pager # [phone]  1/21/2017      SUBJECTIVE: New overnight event: None.     OBJECTIVE:    /82 mmHg  Pulse 69  Temp(Src) 98  F (36.7  C) (Oral)  Resp 16  Ht 1.651 m (5' 5\")  Wt 55.8 kg (123 lb 0.3 oz)  BMI 20.47 kg/m2  SpO2 96%     GENERAL:  NAD.   HEENT:  Head is normocephalic and atraumatic. PERRL. EOMI. No scleral icterus. No conjunctival erythema. No nasal discharge.  Moist mucous membranes. Pharynx unremarkable.  NECK:  Supple. Non-tender. No LAD or masses.  No carotid bruits.  LUNGS:  Clear to auscultation bilaterally. No wheezing, rhonchi or rales.   HEART:  RRR. S1 S2. No murmurs.  ABD:  Soft, ND, NT, + BS. No masses or HSM.   SKIN:  No ulcers, rashes or lesions.   EXT:  No calf tenderness. Pulses equal throughout.   MSK:  No deformities.  Neuro: Right facial drop. dysarthria        Labs:    Most Recent 3 " CBC's:  Recent Labs   Lab Test  01/21/17   0830  01/20/17   0820  01/19/17   0733   WBC  4.6  4.4  6.9   HGB  9.9*  9.3*  9.6*   MCV  97  97  97   PLT  140*  176  209      Most Recent 3 BMP's:  Recent Labs   Lab Test  01/21/17   0830  01/20/17   0820  01/19/17   0733   NA  147*  147*  148*   POTASSIUM  3.9  3.4  3.4   CHLORIDE  115*  116*  117*   CO2  21  20  20   BUN  31*  28  24   CR  1.02  0.96  0.85   ANIONGAP  11  11  11   ABELINO  8.7  8.5  8.5   GLC  112*  104*  126*     Most Recent 3 Troponin's:  Recent Labs   Lab Test  01/15/17   0800   TROPI  <0.015  The 99th percentile for upper reference range is 0.045 ug/L.  Troponin values in   the range of 0.045 - 0.120 ug/L may be associated with risks of adverse   clinical events.       Most Recent 3 INR's:  Recent Labs   Lab Test  01/15/17   0800  12/15/16   2232   INR  1.06  1.02     Most Recent 2 LFT's:No lab results found.  Most Recent Cholesterol Panel:No lab results found.  Most Recent 6 Bacteria Isolates From Any Culture (See EPIC Reports for Culture Details):  Recent Labs   Lab Test  01/15/17   2135  01/15/17   2130   CULT  No growth after 5 days  No growth after 5 days     Most Recent TSH, T4 and HgbA1c: No lab results found.    Prescriptions prior to admission   Medication Sig Dispense Refill Last Dose     Acetaminophen (TYLENOL PO) Take 650 mg by mouth every 6 hours as needed for mild pain or fever   prn     senna-docusate (SENOKOT-S;PERICOLACE) 8.6-50 MG per tablet Take 1 tablet by mouth 2 times daily   1/14/2017 at pm     TRAMADOL HCL PO Take 50 mg by mouth every 6 hours as needed for moderate to severe pain   prn     polyethylene glycol (MIRALAX/GLYCOLAX) powder Take 17 g by mouth daily as needed    prn     ACETAMINOPHEN PO Take 650 mg by mouth 3 times daily    1/14/2017 at pm     folic acid (FOLVITE) 1 MG tablet Take 1 tablet (1 mg) by mouth daily 30 tablet 0 1/14/2017 at am     aspirin 81 MG tablet Take 1 tablet (81 mg) by mouth daily 30 tablet   1/14/2017 at am     CYANOCOBALAMIN SL Place 500 mcg under the tongue daily   1/14/2017 at am     polyethylene glycol 0.4%- propylene glycol 0.3% (SYSTANE ULTRA) 0.4-0.3 % SOLN ophthalmic solution Place 1 drop into both eyes every hour as needed for dry eyes   prn     metoprolol (TOPROL-XL) 25 MG 24 hr tablet Take 1 tablet (25 mg) by mouth daily   1/14/2017 at am     simvastatin (ZOCOR) 20 MG tablet Take 1 tablet (20 mg) by mouth At Bedtime   1/14/2017 at hs     predniSONE (DELTASONE) 5 MG tablet Take 5 mg by mouth daily    1/14/2017 at am     gabapentin (NEURONTIN) 300 MG capsule Take 300 mg by mouth At Bedtime    1/14/2017 at hs     allopurinol (ZYLOPRIM) 100 MG tablet Take 1 tablet (100 mg) by mouth daily   1/14/2017 at am     meclizine (ANTIVERT) 25 MG tablet Take 1 tablet (25 mg) by mouth every 6 hours as needed for dizziness   prn     raloxifene (EVISTA) 60 MG tablet Take 1 tablet (60 mg) by mouth daily   1/14/2017 at am     calcium carbonate (OS-ABELINO 600 MG [place]. [place]) 1500 (600 CA) MG tablet Take 1 tablet (600 mg) by mouth 2 times daily   1/14/2017 at am     Multiple Vitamins-Minerals (CENTRUM SILVER) per tablet Take 1 tablet by mouth daily   1/14/2017 at am     omega 3 1000 MG CAPS Take 1 g by mouth daily   1/14/2017 at am       Scheduled medications:    diltiazem  60 mg Oral Q8H [facility]     [START ON 1/22/2017] lisinopril  20 mg Oral Daily     lisinopril  10 mg Oral Once     hydrALAZINE  100 mg Oral 4x Daily     gabapentin  300 mg Oral At Bedtime     methylPREDNISolone  15 mg Intravenous Q24H       I/O last 3 completed shifts:  In: 250 [P.O.:250]  Out: -   Data  Data reviewed today:  I personally reviewed no images or EKG's today.    Recent Labs  Lab 01/21/17  0830 01/20/17  0820 01/19/17  0733  01/15/17  0800   WBC 4.6 4.4 6.9  < > 4.2   HGB 9.9* 9.3* 9.6*  < > 9.8*   MCV 97 97 97  < > 98   * 176 209  < > 225   INR  --   --   --   --  1.06   * 147* 148*  < > 138   POTASSIUM 3.9 3.4 3.4  < > 3.9    CHLORIDE 115* 116* 117*  < > 105   CO2 21 20 20  < > 24   BUN 31* 28 24  < > 26   CR 1.02 0.96 0.85  < > 1.06*   ANIONGAP 11 11 11  < > 9   ABELINO 8.7 8.5 8.5  < > 9.0   * 104* 126*  < > 85   TROPI  --   --   --   --  <0.015The 99th percentile for upper reference range is 0.045 ug/L.  Troponin values in the range of 0.045 - 0.120 ug/L may be associated with risks of adverse clinical events.   < > = values in this interval not displayed.    Recent Results (from the past 24 hour(s))   XR Video Swallow w Esophagram    Narrative    VIDEO FLUOROSCOPIC SWALLOW STUDY WITH SPEECH THERAPY 1/20/2017 10:54  AM     HISTORY: Dysphagia.    FLUORO TIME: 2 minutes.    Video runs: 8    FINDINGS: The patient was observed swallowing a variety of  consistencies from solid to thin liquid. With thin consistency, there  was delayed swallow with premature pooling in the vallecula and  piriform sinuses. With thin consistency by cup, there was a single  instance of trace laryngeal penetration. No slade aspiration was  demonstrated. Mild residual was noted within the vallecula which the  patient readily cleared with additional swallowing.      Impression    IMPRESSION: Single instance of laryngeal penetration with thin liquid.  No slade aspiration. Delayed swallowing with premature pooling in the  vallecula and piriform sinuses. Otherwise unremarkable swallow study.    This study includes only the cervical esophagus.    CHRISTIANO DE LA PAZ MD

## 2017-01-21 NOTE — PLAN OF CARE
Problem: Goal Outcome Summary  Goal: Goal Outcome Summary  PT- cx, attempted to see patient but nurse had just finished repositioning pt and reported that patient was sad and was crying, we deemed appropriate to hold PT this pm and allow patient to visit with 4 visitors who all came in at about the same time, will reschedule PT for tomorrow.

## 2017-01-21 NOTE — PROVIDER NOTIFICATION
"MD notified that pt c/o stomach pain,  Last BM 1/18 \"smear\"  Requested bowel protocol.  ?protonix.  Awaiting call back   "

## 2017-01-21 NOTE — PLAN OF CARE
Problem: Goal Outcome Summary  Goal: Goal Outcome Summary  OT:Pt. able to come supine-sit w/overall mod-max. A;pt. Sat EOB X 15 min. w/variable levels of assist, SBA-mod. A, for partcipation in neuromuscular nelli./WB ex., ADL's. Pt.'s BP stable throughtout(supine at start=139/72, initial sit=121/61, after 15 min. Sit=116/59). Pt. Following simple comaands, inconsistently. Pt. Able to turn head to R, locate/name 3 objects in room. Cont. To rec. DC ARU. Pt. xurrently limited by R-sided weakness, R neglect, impaired cognition/safety, impaired balance.

## 2017-01-21 NOTE — PLAN OF CARE
Problem: Goal Outcome Summary  Goal: Goal Outcome Summary  Outcome: Therapy, progress towards functional goals is fair  SLP: D/t pain issues, tx was limited today. No overt s/sx of aspiration with two sips of nectars by cup. Slowed oral stripping and decreased hyolaryngeal elevation. Recommend: Continue dysphagia diet level 2 and nectars, no straws, small bites and sips, use liquid was/alternate solids and liquids, upright position for all PO intake, education and exercises as appropriate. Recommend ARU/TCU pending ongoing participation/motivation.

## 2017-01-22 NOTE — PLAN OF CARE
Problem: Goal Outcome Summary  Goal: Goal Outcome Summary  PT: Pt agreeable to session; max A x2 for supine > sit; tolerated sitting EOB [id number] with various levels of assist needed (CGA to mod A) for midline positioning; inconsistent command following.    Recommend ARU for progression of IND with mobility.

## 2017-01-22 NOTE — PLAN OF CARE
Problem: Goal Outcome Summary  Goal: Goal Outcome Summary  Outcome: No Change  Alert, oriented to self and place, knows she is in the hospital. R facial droop, tongue extension to left. RUE hemiplegia, RLE hemiparesis, difficulty with following commands, garbled speech at times. VSS. Tele NSR. DD2 diet with NTL. Appetite fair. Turned and repositioned every two hours. Incontinent of bowel and bladder, had large BM this morning. Tylenol given for abdominal discomfort, relief after BM. Continue to monitor.

## 2017-01-22 NOTE — PLAN OF CARE
Problem: Goal Outcome Summary  Goal: Goal Outcome Summary  SLP: Pt able to tolerate all consistencies (thins by spoon, ice, nectars by cup, pudding and cracker with pudding) with no overt s/sx of aspiration but with mod reduced speed of hyolaryngeal elevation and mild-mod discoordination of laryngeal elevation. RN reports pocketing of eggs from earlier today and pt did have obvious difficulty (and eyes watering) for semi solid texture. REC: Downgrade to dysphagia diet 1 and continue nectars.Swallowing precautions include supervision with verbal cues for use of strategies, sit up at 90 degrees, small bites/sips, alternate solids/liquids, swallow 2x per bite/sip, slow pace, regular oral cares. Recommend meds given with teaspoon of nectar-thick liquids or puree.

## 2017-01-22 NOTE — PLAN OF CARE
Problem: Goal Outcome Summary  Goal: Goal Outcome Summary  OT: Attempted to see, pt with another discipline eating breakfast.  Pt being fed at this time.  Will try back as schedule allows.

## 2017-01-22 NOTE — PLAN OF CARE
Problem: Goal Outcome Summary  Goal: Goal Outcome Summary  OT: Mod A supine to sit. Initially min-mod A to maintain static sitting balance, progressed to SBA with use of bedrail. When presented with her comb on R, pt initiated grasp with R hand, but unable to raise forearm from the bed. Ideational apraxia with hair comb - pt attempted to use to brush teeth, not able to self-correct with VC's. Hand over hand to initiate overhead with LUE. Participated in RUE neuro re-ed activities with R WBing, CGA and VC's throughout to complete x 10. Limited SROM with mod A to initiate and complete through available range in shoulder planes. Recommend ARU.

## 2017-01-22 NOTE — PLAN OF CARE
Problem: Individualization  Goal: Patient Preferences  Outcome: Improving  Discussed BP findings with MD. MD stated that she would like us to treat BP s over 150 instead of 140. Med changes per MD; increase hydralazine, increase Lisinopril, and discontinue metroprolol.  Decreased appetite through shift. Enjoyed visitors through shift and participated in OT.  Right facial droop and decreased motor strength on right.

## 2017-01-22 NOTE — PLAN OF CARE
Problem: Goal Outcome Summary  Goal: Goal Outcome Summary  Outcome: Improving  A&O x4 for most of shift, MATTY orientation later in shift.  Pt more vocal tonight, able to voice more needs and follow more commands. R facial droop and R field cuts noted,  RUE hemiplegia and RLE hemiparesis. VSS, BPs w/i paramenters. Tele SB. DD2 w/ NT liquids diet. Up with 2 asst/lift. Mild pain to L hip, tylenol and repositioning effective. Plan pos d/c to ARU.

## 2017-01-22 NOTE — PROGRESS NOTES
Care Transition Initial Assessment -   Reason For Consult: discharge planning  Met with: Patient and Family    Active Problems:    ICH (intracerebral hemorrhage) (H)         DATA  Lives With: significant other  Living Arrangements: condominium  Description of Support System: Supportive, Involved  Who is your support system?: Partner    Identified issues/concerns regarding health management: Pt was at [facility] for rehab stay and due for discharge home in a couple of days when she suffered a stroke.  Patient feels that they have adequate support @ home?  No, not at this time. Pt and partner, Caprice, hoping that pt will be able to return home following rehab stay at ARU/TCU. Pt and Caprice are new to the area having recently moved from [place]. Caprice is open to having pt go to  ARU and ARU is assessing for possible admission on Monday (1/23/17)  Caprice discussed TCU facilities in the area and she is reviewing the list of TCU and will call  with choices if needed. They would be agreeable to ARU as well as a return to [facility]. ARU liaison to re-assess Monday am and determine if pt would meet criteria for placement .        Transportation Available: family or friend will provide. Pt may possibly need transportation arranged at discharge through [facility].      ASSESSMENT  Cognitive Status:  awake and confused  Concerns to be addressed: Pt will need rehab placement preferably close to home.       PLAN  Patient Goals and Preferences:  ARU/TCU  Patient anticipates discharging to: D

## 2017-01-22 NOTE — PROGRESS NOTES
"CLINICAL NUTRITION SERVICES  -  ASSESSMENT NOTE    Recommendations Ordered by Registered Dietitian (RD):   Magic cup oral supplement TID w/ meals   Malnutrition: Patient does not meet two of the criteria necessary for diagnosing malnutrition     REASON FOR ASSESSMENT  Michelle Lucio is a 85 year old female seen by Registered Dietitian for LOS    NUTRITION HISTORY  - Information obtained from Caprice, pt's partner and EMR.   - Pt presented to ED from acute rehab facility where she was not eating very well \"I think all she ate there was shrimp salad\" She was not eating well at home prior to her previous admission (50-75% of normal).     CURRENT NUTRITION ORDERS  Diet Order:     Dysphagia Level II - since 1/20   Nectar thick liquids    Current Intake/Tolerance:  Pt eating ~50% of very small trays over admission. Per Caprice, pt ate two spoons of mashed potatoes yesterday at one meal, had eaten some noodles prior to visit, and was taking spoons of cran juice.     Pt requires help feeding.     PHYSICAL FINDINGS  Observed  No nutrition-related physical findings observed  Obtained from Chart/Interdisciplinary Team  None noted    ANTHROPOMETRICS  Height: 5' 5\"  Weight: 62.5 kg  Body mass index is 22.8 kg/(m^2).  Weight Status:  Normal BMI  IBW: 56.8 kg  % IBW: 110%  Weight History: no weight loss reported or indicated by weight history.   Wt Readings from Last 10 Encounters:   01/22/17 71 kg (156 lb 8.4 oz)   01/12/17 64.683 kg (142 lb 9.6 oz)   12/21/16 68.584 kg (151 lb 3.2 oz)   12/19/16 71.5 kg (157 lb 10.1 oz)   10/27/16 65.318 kg (144 lb)       LABS  Labs reviewed    MEDICATIONS  Medications reviewed    Dosing Weight 62.5 kg - wt from 1/15    ASSESSED NUTRITION NEEDS:  Estimated Energy Needs: 4950-8451 kcals (25-30 Kcal/Kg)  Justification: maintenance  Estimated Protein Needs:  grams protein (1.2-1.8 g pro/Kg)  Justification: preservation of lean body mass  Estimated Fluid Needs: 6774-0740 mL (1 mL/Kcal)  Justification: " maintenance    MALNUTRITION:  % Weight Loss:  None noted  % Intake:  <75% for >/= 1 month (non-severe malnutrition)  Subcutaneous Fat Loss:  None observed  Muscle Loss:  None observed  Fluid Retention:  None noted    Malnutrition Diagnosis: Patient does not meet two of the above criteria necessary for diagnosing malnutrition    NUTRITION DIAGNOSIS:  Inadequate oral intake related to poor appetite and difficulty swallowing as evidenced by intakes of 50% of very small meals since diet advancement.     NUTRITION INTERVENTIONS  Recommendations / Nutrition Prescription  Diet per SLP  Nutritional supplements with meals    Implementation  Nutrition education: Provided education on supplements, adequate protein  Medical Food Supplement: Magic Cup with meals    Nutrition Goals  Pt to consume at least 50% of meals TID.     MONITORING AND EVALUATION:  Progress towards goals will be monitored and evaluated per protocol and Practice Guidelines    Irma Gunter RD, LD

## 2017-01-22 NOTE — PROGRESS NOTES
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   --- MRI of the brain on 1/16/17 showed slight increase of thalamic hemorrhage  --- Repeat CT today which showed slight increase of thalamic hemorrhage from the 1st CT and no change from the last MRI  --- PT/OT/SLP to follow  -- Diet per SLP  -- Video swallow study done and diet was resumed as per speech recommendation      Malignant HTN: Only on metoprolol?   --- Off Nicardipine drip  --- titrated up Hydralazin to 100 mg QID  --- HR on the lower range and will Cardizem  --- Increase Lisinopril to 40 mg daily  ----Use  PRN Clonidine 0.1 mg QID for SBP >150 before using prn IV meds  --- Continue PRN Labetalol and hydralazin for SBP >150      DVT: SCDs.      Disposition: ARU once able to control SBP with po meds      Sole Velez MD  [facility]ist  Board certified,   Internal Medicine  Pager # [phone]  1/222017      SUBJECTIVE: New overnight event: None.     OBJECTIVE:    /80 mmHg  Pulse 69  Temp(Src) 98.2  F (36.8  C) (Oral)  Resp 16  Ht 1.651 m (5' 5\")  Wt 71 kg (156 lb 8.4 oz)  BMI 26.05 kg/m2  SpO2 98%     GENERAL:  NAD.   HEENT:  Head is normocephalic and atraumatic. PERRL. EOMI. No scleral icterus. No conjunctival erythema. No nasal discharge.  Moist mucous membranes. Pharynx unremarkable.  NECK:  Supple. Non-tender. No LAD or masses.  No carotid bruits.  LUNGS:  Clear to auscultation bilaterally. No wheezing, rhonchi or rales.   HEART:  RRR. S1 S2. No murmurs.  ABD:  Soft, ND, NT, + BS. No masses or HSM.   SKIN:  No ulcers, rashes or lesions.   EXT:  No calf tenderness. Pulses equal throughout.   MSK:  No deformities.  Neuro: Right facial drop. dysarthria        Labs:    Most Recent 3 CBC's:  Recent Labs   Lab " Test  01/22/17   0750  01/21/17   0830  01/20/17   0820   WBC  5.0  4.6  4.4   HGB  10.4*  9.9*  9.3*   MCV  97  97  97   PLT  143*  140*  176      Most Recent 3 BMP's:  Recent Labs   Lab Test  01/22/17   0750  01/21/17   0830  01/20/17   0820   NA  147*  147*  147*   POTASSIUM  3.8  3.9  3.4   CHLORIDE  115*  115*  116*   CO2  21  21  20   BUN  35*  31*  28   CR  0.99  1.02  0.96   ANIONGAP  11  11  11   ABELINO  8.8  8.7  8.5   GLC  120*  112*  104*     Most Recent 3 Troponin's:  Recent Labs   Lab Test  01/15/17   0800   TROPI  <0.015  The 99th percentile for upper reference range is 0.045 ug/L.  Troponin values in   the range of 0.045 - 0.120 ug/L may be associated with risks of adverse   clinical events.       Most Recent 3 INR's:  Recent Labs   Lab Test  01/15/17   0800  12/15/16   2232   INR  1.06  1.02     Most Recent 2 LFT's:No lab results found.  Most Recent Cholesterol Panel:No lab results found.  Most Recent 6 Bacteria Isolates From Any Culture (See EPIC Reports for Culture Details):  Recent Labs   Lab Test  01/15/17   2135  01/15/17   2130   CULT  No growth  No growth     Most Recent TSH, T4 and HgbA1c: No lab results found.    Prescriptions prior to admission   Medication Sig Dispense Refill Last Dose     Acetaminophen (TYLENOL PO) Take 650 mg by mouth every 6 hours as needed for mild pain or fever   prn     senna-docusate (SENOKOT-S;PERICOLACE) 8.6-50 MG per tablet Take 1 tablet by mouth 2 times daily   1/14/2017 at pm     TRAMADOL HCL PO Take 50 mg by mouth every 6 hours as needed for moderate to severe pain   prn     polyethylene glycol (MIRALAX/GLYCOLAX) powder Take 17 g by mouth daily as needed    prn     ACETAMINOPHEN PO Take 650 mg by mouth 3 times daily    1/14/2017 at pm     folic acid (FOLVITE) 1 MG tablet Take 1 tablet (1 mg) by mouth daily 30 tablet 0 1/14/2017 at am     aspirin 81 MG tablet Take 1 tablet (81 mg) by mouth daily 30 tablet  1/14/2017 at am     CYANOCOBALAMIN SL Place 500 mcg under  the tongue daily   1/14/2017 at am     polyethylene glycol 0.4%- propylene glycol 0.3% (SYSTANE ULTRA) 0.4-0.3 % SOLN ophthalmic solution Place 1 drop into both eyes every hour as needed for dry eyes   prn     metoprolol (TOPROL-XL) 25 MG 24 hr tablet Take 1 tablet (25 mg) by mouth daily   1/14/2017 at am     simvastatin (ZOCOR) 20 MG tablet Take 1 tablet (20 mg) by mouth At Bedtime   1/14/2017 at hs     predniSONE (DELTASONE) 5 MG tablet Take 5 mg by mouth daily    1/14/2017 at am     gabapentin (NEURONTIN) 300 MG capsule Take 300 mg by mouth At Bedtime    1/14/2017 at hs     allopurinol (ZYLOPRIM) 100 MG tablet Take 1 tablet (100 mg) by mouth daily   1/14/2017 at am     meclizine (ANTIVERT) 25 MG tablet Take 1 tablet (25 mg) by mouth every 6 hours as needed for dizziness   prn     raloxifene (EVISTA) 60 MG tablet Take 1 tablet (60 mg) by mouth daily   1/14/2017 at am     calcium carbonate (OS-ABELINO 600 MG [place]. CA) 1500 (600 CA) MG tablet Take 1 tablet (600 mg) by mouth 2 times daily   1/14/2017 at am     Multiple Vitamins-Minerals (CENTRUM SILVER) per tablet Take 1 tablet by mouth daily   1/14/2017 at am     omega 3 1000 MG CAPS Take 1 g by mouth daily   1/14/2017 at am       Scheduled medications:    [START ON 1/23/2017] lisinopril  40 mg Oral Daily     hydrALAZINE  100 mg Oral 4x Daily     gabapentin  300 mg Oral At Bedtime     methylPREDNISolone  15 mg Intravenous Q24H          Data  Data reviewed today:  I personally reviewed no images or EKG's today.    Recent Labs  Lab 01/22/17  0750 01/21/17  0830 01/20/17  0820   WBC 5.0 4.6 4.4   HGB 10.4* 9.9* 9.3*   MCV 97 97 97   * 140* 176   * 147* 147*   POTASSIUM 3.8 3.9 3.4   CHLORIDE 115* 115* 116*   CO2 21 21 20   BUN 35* 31* 28   CR 0.99 1.02 0.96   ANIONGAP 11 11 11   ABELINO 8.8 8.7 8.5   * 112* 104*       No results found for this or any previous visit (from the past 24 hour(s)).

## 2017-01-22 NOTE — PLAN OF CARE
Problem: Discharge Planning  Goal: Discharge Planning (Adult, OB, Behavioral, Peds)  CM:  Pt's goal is rehab stay ARU/TCU.

## 2017-01-23 NOTE — PLAN OF CARE
Problem: Goal Outcome Summary  Goal: Goal Outcome Summary  OT: pt was sleeping, family present in room, requested to let pt sleep right now and asked for OT to return later.

## 2017-01-23 NOTE — PROGRESS NOTES
Pt up to chair with very strong 2, gait belt, and Yola Steady. Unable to provide any effort or bear weight on R side. Sliding forward in chair, leaning to R side even with pillows in place, and poor trunk control. RN deemed pt unsafe to stay up in chair for lunch, returned to bed with lift.

## 2017-01-23 NOTE — PROGRESS NOTES
"Social Work Progress Note  Pt chart reviewed. Pt discussed in interdisciplinary rounds.     Intervention: Per Gretchen at [facility], they are able to accept pt today. However, they do not currently have a private room available. Shanna at [facility] informed anna that they do not have any beds available at this time. Anna contacted Debbie three times and left voice messages. Anna updated Caprice and she stated that they will accept the bed at [facility].     Caprice understands that it is a semi-private room, and they would like to transition to a private room when one becomes available. Per bedside RN, pt will need a stretcher because she is \"unable to provide any effort or bear weight on R side. Sliding forward in chair, leaning to R side even with pillows in place, and poor trunk control\". Anna contacted [facility] and arranged for a stretcher transport for 1630. Anna updated Caprice. PCS form completed, faxed, and placed in chart.     Team members notified: Bedside RN, CC, & HUC    Plan:  Anticipated Discharge Placement: [facility] (P: [phone]; F: [phone])  Barriers: None identified at this time.   Follow-up plan: Anna to continue to follow.    JERRY Nguyen, [facility]  [phone]    "

## 2017-01-23 NOTE — PLAN OF CARE
Problem: Goal Outcome Summary  Goal: Goal Outcome Summary  PT: Pt in bed upon PT arrival. Per RN, pt was up in  the chair earlier today, needed to use the ceiling lift to return to bed. Supine BP 93/48. Deferred sitting EOB. Pt did participate well with supine assisted LE exercises. Rec TCU at hospital disch.

## 2017-01-23 NOTE — PROGRESS NOTES
Social Work Progress Note  Pt chart reviewed. Pt discussed in interdisciplinary rounds.     Intervention: Fahad met with pt's SO Caprice and she requested that sw sends a referral to [facility] (P: [phone]; F: [phone]), [facility] (P: [phone]; F: [phone]), and [facility] [address]. Fahad faxed referrals to the above facilities via [facility] on the [facility]. Fahad also explained to Caprice that [facility] does not feel pt is appropriate for Acute Rehab at this time. Caprice shared that she feels the same, and is fine with pt discharging to a TCU instead.     Team members notified: CC    Plan:  Anticipated Discharge Placement: TCU  Barriers: None identified at this time.   Follow-up plan: Fahad to continue to follow.     JERRY Nguyen, SW  [phone]

## 2017-01-23 NOTE — PLAN OF CARE
Problem: Goal Outcome Summary  Goal: Goal Outcome Summary  Outcome: Adequate for Discharge Date Met:  01/23/17  Garbled, slurred speech. Understands simple commands, some difficulty comprehending longer instructions. R facial droop, field cut, neglect. RUE hemiplegia, RLE hemiparesis. Sensation appears intact. Incontinent of urine. Up to chair x1 today, see note; required lift back to bed. Tolerating DD1 with nectar thick liquids with full feeding assistance, poor to fair appetite. Alert, disoriented to situation and time. Labile BP, MD aware and hydralazine frequency decreased for DC. Pt DCd to [facility] via [facility] stretcher.

## 2017-01-23 NOTE — PLAN OF CARE
Problem: Goal Outcome Summary  Goal: Goal Outcome Summary  Outcome: Improving  A&O x4, MATTY at times.  Able to voice more needs and follow some commands. R facial droop and R field cuts noted,  RUE hemiplegia and RLE hemiparesis. VSS, SBP elevated x1, PO catapres effective. Tele SR. DD1 w/ NT liquids diet. Up with 2 asst/lift. Denies pain. Plan pos d/c to ARU.

## 2017-01-23 NOTE — PLAN OF CARE
Problem: Goal Outcome Summary  Goal: Goal Outcome Summary  SLP: Patient was seen for swallow treatment. Sh was alert and upright. Nurse present to give her medications crushed in apple sauce. She demonstrated poor bolus coordination and AP transit with pureed textures. Delayed swallow response and minimal to mild oral residue that cleared with a liquids rinse. Tolerated nectar thick liquids by spoon. Throat clear noted via the cup. Noted improvement with her swallow function since last week. Improved language function since last week when I saw her. Able to name common objects in the room with 80%. Recommend: 1. Continue on the Dysphagia Diet level 1 with nectar thick liquids by spoon. Verify each swallow, slow rate, alternate liquids/solids. 2. SLP will continue to follow for diet tolerance and swallow strategies. Defer speech and language evaluation to the next level of care.  Discharge to ARU due to progress made and motivation.

## 2017-01-23 NOTE — DISCHARGE SUMMARY
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  The following problems were addressed during her hospitalization:    Non traumatic subcortical hemorrhage of the  Left cerebral hemisphere:  ---- 2 cm left thalamic hemorrhage  --- Neurology consulted and followed.    --- MRI of the brain on 1/16/17 showed slight increase of thalamic hemorrhage  --- Repeat CT today which showed slight increase of thalamic hemorrhage from the 1st CT and no change from the last MRI  --- PT/OT/SLP followed  -- Diet per SLP  -- Video swallow study done and diet was resumed as per speech recommendation        Malignant HTN: Better controlled  --- Off Nicardipine drip  --- titrated up Hydralazin to 100 mg QID and will continue the same  --- Increased Lisinopril to 40 mg daily and continue the same    Patient was seen prior to discharge and she was found to be stable.         Code Status:      DNR / DNI         Important Results:      See below.         Pending Results:        Unresulted Labs Ordered in the Past 30 Days of this Admission     No orders found from 11/17/2016 to 1/16/2017.               Discharge Instructions and Follow-Up:               Discharge Disposition:      Discharged to nursing home         Discharge  Medications:        Current Discharge Medication List      START taking these medications    Details   hydrALAZINE (APRESOLINE) 100 MG TABS tablet Take 1 tablet (100 mg) by mouth 3 times daily  Qty: 60 tablet    Comments: Hold if SBP <120  Associated Diagnoses: Malignant hypertension      lisinopril (PRINIVIL/ZESTRIL) 40 MG tablet Take 1 tablet (40 mg) by mouth daily  Qty: 30 tablet    Comments: Hold if SBP <120  Associated Diagnoses: Essential hypertension, malignant         CONTINUE these medications which have NOT CHANGED    Details   Acetaminophen (TYLENOL PO) Take 650 mg by mouth every 6 hours as needed for mild pain or fever      senna-docusate (SENOKOT-S;PERICOLACE) 8.6-50 MG per tablet Take 1 tablet by mouth 2 times daily      TRAMADOL HCL PO Take 50 mg by mouth every 6 hours as needed for moderate to severe pain      polyethylene glycol (MIRALAX/GLYCOLAX) powder Take 17 g by mouth daily as needed       folic acid (FOLVITE) 1 MG tablet Take 1 tablet (1 mg) by mouth daily  Qty: 30 tablet, Refills: 0    Associated Diagnoses: Intertrochanteric fracture of left hip, closed, initial encounter (H)      CYANOCOBALAMIN SL Place 500 mcg under the tongue daily      polyethylene glycol 0.4%- propylene glycol 0.3% (SYSTANE ULTRA) 0.4-0.3 % SOLN ophthalmic solution Place 1 drop into both eyes every hour as needed for dry eyes      simvastatin (ZOCOR) 20 MG tablet Take 1 tablet (20 mg) by mouth At Bedtime      predniSONE (DELTASONE) 5 MG tablet Take 5 mg by mouth daily       gabapentin (NEURONTIN) 300 MG capsule Take 300 mg by mouth At Bedtime       allopurinol (ZYLOPRIM) 100 MG tablet Take 1 tablet (100 mg) by mouth daily      meclizine (ANTIVERT) 25 MG tablet Take 1 tablet (25 mg) by mouth every 6 hours as needed for dizziness      raloxifene (EVISTA) 60 MG tablet Take 1 tablet (60 mg) by mouth daily      calcium carbonate (OS-ABELINO 600 MG [place]. [place]) 1500 (600 CA) MG tablet Take 1 tablet (600 mg) by mouth 2 times daily     "  Multiple Vitamins-Minerals (CENTRUM SILVER) per tablet Take 1 tablet by mouth daily      omega 3 1000 MG CAPS Take 1 g by mouth daily         STOP taking these medications       aspirin 81 MG tablet Comments:   Reason for Stopping:         metoprolol (TOPROL-XL) 25 MG 24 hr tablet Comments:   Reason for Stopping:                    Allergies:         Allergies   Allergen Reactions     Colchicine      Diarrhea       Hydroxychloroquine      Pilocarpine      Sweating, chills.     Probenecid      Diarrhea       Tizanidine      Increased dizzines but prescribed for Pain management            Consultations This Hospital Stay:      Consultation during this admission received from neurology         Condition and Physical Exam on Discharge:      Discharge condition: Stable   Discharge vitals: Heart Rate: 77, Blood pressure 93/48, pulse 69, temperature 98.1  F (36.7  C), temperature source Oral, resp. rate 16, height 1.651 m (5' 5\"), weight 66 kg (145 lb 8.1 oz), SpO2 96 %.       Constitutional: Awake, alert. No apparent distress   Lungs: Clear to auscultation bilaterally, no crackles or wheezing   Cardiovascular: Regular HR, normal S1 and S2, and no murmur noted   Abdomen: Normal bowel sounds, soft, non-distended, non-tender   Skin: No rashes, no cyanosis.   Other: LE: Trace edema.                Discharge Orders for Skilled Facility (from Discharge Orders):        After Care Instructions     Activity - Up ad tanisha           Advance Diet as Tolerated       Follow this diet upon discharge: Orders Placed This Encounter  Snacks/Supplements Adult: Magic Cup; With Meals  Dysphagia Diet Level 1 Pureed Nectar Thickened Liquids (pre-thickened or use instant food thickener)            General info for SNF       Length of Stay Estimate: Short Term Care: Estimated # of Days <30  Condition at Discharge: Stable  Level of care:skilled   Rehabilitation Potential: Fair  Admission H&P remains valid and up-to-date: Yes  Recent Chemotherapy: " N/A  Use Nursing Home Standing Orders: Yes            Mantoux instructions       Give two-step Mantoux (PPD) Per Facility Policy Yes                           Rehab orders for Skilled Facility (from Discharge Orders):      Referrals     Future Labs/Procedures    Occupational Therapy Adult Consult     Comments:    Evaluate and treat as clinically indicated.    Reason:  CVA    Physical Therapy Adult Consult     Comments:    Evaluate and treat as clinically indicated.    Reason:  CVA    Speech Language Path Adult Consult     Comments:    Evaluate and treat as clinically indicated.    Reason:  CVA             Discharge Time:      Greater than 30 minutes.        Image Results From This Hospital Stay (For Non-EPIC Providers):        Results for orders placed or performed during the hospital encounter of 01/15/17   CT Head w/o Contrast     Value    Radiologist flags Left thalamic parenchymal hemorrhage (AA)    Narrative    CT HEAD W/O CONTRAST   1/15/2017 7:32 AM     HISTORY: woke up with right sided weakness, slurred speech and  confusion    TECHNIQUE: Axial images of the head without IV contrast material.  Radiation dose for this scan was reduced using automated exposure  control, adjustment of the mA and/or kV according to patient size, or  iterative reconstruction technique.    COMPARISON: None.    FINDINGS:  There is generalized atrophy of the brain.  Areas of low  attenuation are present in the white matter of the cerebral  hemispheres that are consistent with small vessel ischemic disease in  this age patient. There is a parenchymal hemorrhage in the left  thalamic region. This measures approximately 2 cm in AP dimension by  1.5 cm in transverse dimension and 2 cm in cephalocaudad dimension.  The volume of this lesion is approximately 3 cc. There is slight mass  effect on the third ventricle but no shift of midline structures. No  hydrocephalus The visualized portions of the sinuses and mastoids  appear normal.  There is no evidence of trauma.      Impression    IMPRESSION:   1. Left thalamic parenchymal hemorrhage. Volume is approximately 3 cc.  Mild mass effect on the third ventricle but no shift of midline  structures.  2. Atrophy of the brain.  White matter changes consistent with small  vessel ischemic disease.     [Critical Result: Left thalamic parenchymal hemorrhage]    Finding was identified on 1/15/2017 7:34 AM.     Dr. Lazo was contacted by me on 1/15/2017 7:36 AM and verbalized  understanding of the critical result.     DAVID CRAWFORD MD   CT Head Neck Angio w/o & w Contrast    Narrative    CTA ANGIOGRAM HEAD NECK  1/15/2017 7:42 AM     HISTORY: Right-sided weakness and slurred speech. Parenchymal bleed.    TECHNIQUE:  Precontrast localizing scans were followed by CT  angiography with an injection of 70 mL [id number]-370 IV contrast with  scans through the head and neck.  Images were transferred to a  separate 3-D workstation where multiplanar reformations and 3-D images  were created.  Estimates of carotid stenoses are made relative to the  distal internal carotid artery diameters except as noted. Radiation  dose for this scan was reduced using automated exposure control,  adjustment of the mA and/or kV according to patient size, or iterative  reconstruction technique.    COMPARISON: None.    FINDINGS: Estimates of stenosis at the carotid bifurcations are  relative to the distal internal carotids.    Right Carotid:  The right common carotid artery is normal.  Calcified  atherosclerotic plaque right carotid bifurcation. No significant  stenosis.  The right internal carotid artery is negative. Intracranial  images on the right are unremarkable.  No occluded vessels are seen.  A1 segment of the right anterior cerebral is hypoplastic    Left Carotid:  The left common carotid artery is unremarkable.   Calcified atherosclerotic plaque left carotid bifurcation. No  significant stenosis..  The left internal carotid is  negative.      Intracranial images of the left are unremarkable.   No occluded  vessels are identified.    Vertebrobasilar:  The vertebral arteries are normal. The left  vertebral arises from the arch of the aorta which is a normal Right  vertebral artery is dominant.   The basilar artery and its branches  appear normal.      Impression    IMPRESSION:   1. No occluded vessels or high-grade internal intracranial vascular  stenosis.  2. Calcified plaque at the carotid bifurcations but no significant  stenosis.  3. No arterial dissection identified.  4. 2 cm parenchymal hemorrhage in the left thalamic region.    DAVID CRAWFORD MD   XR Chest Port 1 View    Narrative    CHEST PORTABLE ONE VIEW 1/15/2017 8:41 PM     COMPARISON: Frontal chest x-ray 12/15/2016.    HISTORY: Stroke, intracranial hemorrhage.    FINDINGS: The cardiac silhouette, pulmonary vasculature, lungs and  pleural spaces are within normal limits.      Impression    IMPRESSION: Clear lungs.    YOSI ROSALES MD   MR Brain w/o Contrast    Narrative    MRI BRAIN WITHOUT CONTRAST January 16, 2017 1:25 PM    HISTORY: Intracranial hemorrhage.    TECHNIQUE:  Multiplanar, multisequence MRI of the brain without  gadolinium IV contrast material.      COMPARISON: CT dated 1/15/2017.    FINDINGS: Exam is slightly limited by motion artifact. The hematoma  has expanded slightly during the interval as there is slightly more  mass effect on the fourth ventricle. The hematoma consistent with  acute and subacute components and is better measured on the MR study  where it measures approximately 3.0 x 2.3 x 1.6 cm in size. Mild  cerebral atrophy is present. Patchy white matter changes are seen in  both hemispheres. There is no evidence for any acute ischemic infarct.      Impression    IMPRESSION:  1. Left thalamic hematoma with some increase in size since 1/15/2017  resulting in slightly more mass effect on the third ventricle.  2. Cerebral atrophy with chronic white matter  changes.    ADRIAN DE LA TORRE MD   CT Head w/o Contrast    Narrative    CT HEAD WITHOUT CONTRAST   1/17/2017 9:25 AM     HISTORY: Re-evaluate ICH(intracerebral hemorrhage).    TECHNIQUE: Axial images of the head without IV contrast material.  Radiation dose for this scan was reduced using automated exposure  control, adjustment of the mA and/or kV according to patient size, or  iterative reconstruction technique.    COMPARISON: CT dated 1/15/2017, MR scan dated 1/16/2017.    FINDINGS: There is generalized atrophy of the brain. Areas of low  attenuation are present in the white matter of the cerebral  hemispheres that are consistent with small vessel ischemic disease in  this age patient. Again noted is the parenchymal hemorrhage in the  region of the left thalamus. This currently measures about 2.3 cm in  AP dimension x 1.9 cm in transverse dimension x 2.4 cm in  cephalocaudad dimension. It has increased in size slightly when  compared to 1/15/2017. The current volume is approximately 5.2 cubic  centimeters. On the prior CT the volume was calculated at 3 cubic  centimeters. The visualized portions of the sinuses and mastoids  appear normal. There is no evidence of trauma.      Impression    IMPRESSION:   1. Slight increase in the size of the left thalamic hemorrhage since  the CT of 1/15/2017. No significant change since the MR scan of  1/16/2017. No significant or shift of midline structures.  2. Atrophy of the brain. White matter changes consistent with small  vessel ischemic disease.    DAVID CRAWFORD MD   XR Video Swallow w Esophagram    Narrative    VIDEO FLUOROSCOPIC SWALLOW STUDY WITH SPEECH THERAPY 1/20/2017 10:54  AM     HISTORY: Dysphagia.    FLUORO TIME: 2 minutes.    Video runs: 8    FINDINGS: The patient was observed swallowing a variety of  consistencies from solid to thin liquid. With thin consistency, there  was delayed swallow with premature pooling in the vallecula and  piriform sinuses. With thin  consistency by cup, there was a single  instance of trace laryngeal penetration. No slade aspiration was  demonstrated. Mild residual was noted within the vallecula which the  patient readily cleared with additional swallowing.      Impression    IMPRESSION: Single instance of laryngeal penetration with thin liquid.  No slade aspiration. Delayed swallowing with premature pooling in the  vallecula and piriform sinuses. Otherwise unremarkable swallow study.    This study includes only the cervical esophagus.    CHRISTIANO DE LA PAZ MD           Most Recent Lab Results In EPIC (For Non-EPIC Providers):    Most Recent 3 CBC's:  Recent Labs   Lab Test  01/23/17   0710  01/22/17   0750  01/21/17   0830   WBC  6.0  5.0  4.6   HGB  10.2*  10.4*  9.9*   MCV  97  97  97   PLT  125*  143*  140*      Most Recent 3 BMP's:  Recent Labs   Lab Test  01/23/17   0710  01/22/17   0750  01/21/17   0830   NA  145*  147*  147*   POTASSIUM  4.0  3.8  3.9   CHLORIDE  113*  115*  115*   CO2  23  21  21   BUN  41*  35*  31*   CR  1.12*  0.99  1.02   ANIONGAP  9  11  11   ABELINO  8.5  8.8  8.7   GLC  104*  120*  112*     Most Recent 3 Troponin's:  Recent Labs   Lab Test  01/15/17   0800   TROPI  <0.015  The 99th percentile for upper reference range is 0.045 ug/L.  Troponin values in   the range of 0.045 - 0.120 ug/L may be associated with risks of adverse   clinical events.       Most Recent 3 INR's:  Recent Labs   Lab Test  01/15/17   0800  12/15/16   2232   INR  1.06  1.02     Most Recent 2 LFT's:No lab results found.  Most Recent Cholesterol Panel:No lab results found.  Most Recent 6 Bacteria Isolates From Any Culture (See EPIC Reports for Culture Details):  Recent Labs   Lab Test  01/15/17   2135  01/15/17   2130   CULT  No growth  No growth     Most Recent TSH, T4 and HgbA1c:No lab results found.

## 2017-01-23 NOTE — PROGRESS NOTES
"[facility] Hospitalist Progress note  Sole Velez MD    1/23/2017     ASSESSMENT AN PLAN:    Non traumatic subcortical hemorrhage of the  Left cerebral hemisphere:   ---- 2 cm left thalamic hemorrhage  --- Neurology consulted and followed.   --- MRI of the brain on 1/16/17 showed slight increase of thalamic hemorrhage  --- Repeat CT today which showed slight increase of thalamic hemorrhage from the 1st CT and no change from the last MRI  --- PT/OT/SLP to follow  -- Diet per SLP  -- Video swallow study done and diet was resumed as per speech recommendation      Malignant HTN: Better control  --- Off Nicardipine drip  --- titrated up Hydralazin to 100 mg QID and will continue the same  --- Increased Lisinopril to 40 mg daily and continue the same        DVT: SCDs.      Disposition: Awaiting placement to TCU.       Sole Velez MD  [facility]  Board certified,   Internal Medicine  Pager # [phone]  1/23/2017      SUBJECTIVE: New overnight event: None.     OBJECTIVE:    /58 mmHg  Pulse 69  Temp(Src) 98.4  F (36.9  C) (Oral)  Resp 16  Ht 1.651 m (5' 5\")  Wt 66 kg (145 lb 8.1 oz)  BMI 24.21 kg/m2  SpO2 98%     GENERAL:  NAD.   HEENT:  Head is normocephalic and atraumatic. PERRL. EOMI. No scleral icterus. No conjunctival erythema. No nasal discharge.  Moist mucous membranes. Pharynx unremarkable.  NECK:  Supple. Non-tender. No LAD or masses.  No carotid bruits.  LUNGS:  Clear to auscultation bilaterally. No wheezing, rhonchi or rales.   HEART:  RRR. S1 S2. No murmurs.  ABD:  Soft, ND, NT, + BS. No masses or HSM.   SKIN:  No ulcers, rashes or lesions.   EXT:  No calf tenderness. Pulses equal throughout.   MSK:  No deformities.  Neuro: Right facial drop. dysarthria        Labs:    Most Recent 3 CBC's:  Recent Labs   Lab Test  01/23/17   0710  01/22/17   0750  01/21/17   0830   WBC  6.0  5.0  4.6   HGB  10.2*  10.4*  9.9*   MCV  97  97  97   PLT  125*  143*  " 140*      Most Recent 3 BMP's:  Recent Labs   Lab Test  01/23/17   0710  01/22/17   0750  01/21/17   0830   NA  145*  147*  147*   POTASSIUM  4.0  3.8  3.9   CHLORIDE  113*  115*  115*   CO2  23 21  21   BUN  41*  35*  31*   CR  1.12*  0.99  1.02   ANIONGAP  9  11  11   ABELINO  8.5  8.8  8.7   GLC  104*  120*  112*     Most Recent 3 Troponin's:  Recent Labs   Lab Test  01/15/17   0800   TROPI  <0.015  The 99th percentile for upper reference range is 0.045 ug/L.  Troponin values in   the range of 0.045 - 0.120 ug/L may be associated with risks of adverse   clinical events.       Most Recent 3 INR's:  Recent Labs   Lab Test  01/15/17   0800  12/15/16   2232   INR  1.06  1.02     Most Recent 2 LFT's:No lab results found.  Most Recent Cholesterol Panel:No lab results found.  Most Recent 6 Bacteria Isolates From Any Culture (See EPIC Reports for Culture Details):  Recent Labs   Lab Test  01/15/17   2135  01/15/17   2130   CULT  No growth  No growth     Most Recent TSH, T4 and HgbA1c: No lab results found.    Prescriptions prior to admission   Medication Sig Dispense Refill Last Dose     Acetaminophen (TYLENOL PO) Take 650 mg by mouth every 6 hours as needed for mild pain or fever   prn     senna-docusate (SENOKOT-S;PERICOLACE) 8.6-50 MG per tablet Take 1 tablet by mouth 2 times daily   1/14/2017 at pm     TRAMADOL HCL PO Take 50 mg by mouth every 6 hours as needed for moderate to severe pain   prn     polyethylene glycol (MIRALAX/GLYCOLAX) powder Take 17 g by mouth daily as needed    prn     ACETAMINOPHEN PO Take 650 mg by mouth 3 times daily    1/14/2017 at pm     folic acid (FOLVITE) 1 MG tablet Take 1 tablet (1 mg) by mouth daily 30 tablet 0 1/14/2017 at am     aspirin 81 MG tablet Take 1 tablet (81 mg) by mouth daily 30 tablet  1/14/2017 at am     CYANOCOBALAMIN SL Place 500 mcg under the tongue daily   1/14/2017 at am     polyethylene glycol 0.4%- propylene glycol 0.3% (SYSTANE ULTRA) 0.4-0.3 % SOLN ophthalmic  solution Place 1 drop into both eyes every hour as needed for dry eyes   prn     metoprolol (TOPROL-XL) 25 MG 24 hr tablet Take 1 tablet (25 mg) by mouth daily   1/14/2017 at am     simvastatin (ZOCOR) 20 MG tablet Take 1 tablet (20 mg) by mouth At Bedtime   1/14/2017 at hs     predniSONE (DELTASONE) 5 MG tablet Take 5 mg by mouth daily    1/14/2017 at am     gabapentin (NEURONTIN) 300 MG capsule Take 300 mg by mouth At Bedtime    1/14/2017 at hs     allopurinol (ZYLOPRIM) 100 MG tablet Take 1 tablet (100 mg) by mouth daily   1/14/2017 at am     meclizine (ANTIVERT) 25 MG tablet Take 1 tablet (25 mg) by mouth every 6 hours as needed for dizziness   prn     raloxifene (EVISTA) 60 MG tablet Take 1 tablet (60 mg) by mouth daily   1/14/2017 at am     calcium carbonate (OS-ABELINO 600 MG [place]. [place]) 1500 (600 CA) MG tablet Take 1 tablet (600 mg) by mouth 2 times daily   1/14/2017 at am     Multiple Vitamins-Minerals (CENTRUM SILVER) per tablet Take 1 tablet by mouth daily   1/14/2017 at am     omega 3 1000 MG CAPS Take 1 g by mouth daily   1/14/2017 at am       Scheduled medications:    lisinopril  40 mg Oral Daily     hydrALAZINE  100 mg Oral 4x Daily     gabapentin  300 mg Oral At Bedtime     methylPREDNISolone  15 mg Intravenous Q24H       I/O last 3 completed shifts:  In: 840 [P.O.:840]  Out: -   Data  Data reviewed today:  I personally reviewed no images or EKG's today.    Recent Labs  Lab 01/23/17  0710 01/22/17  0750 01/21/17  0830   WBC 6.0 5.0 4.6   HGB 10.2* 10.4* 9.9*   MCV 97 97 97   * 143* 140*   * 147* 147*   POTASSIUM 4.0 3.8 3.9   CHLORIDE 113* 115* 115*   CO2 23 21 21   BUN 41* 35* 31*   CR 1.12* 0.99 1.02   ANIONGAP 9 11 11   ABELINO 8.5 8.8 8.7   * 120* 112*       No results found for this or any previous visit (from the past 24 hour(s)).

## 2017-01-24 NOTE — PROGRESS NOTES
[place] [facility]  PRIMARY CARE PROVIDER AND CLINIC:  Graciela Jameson [facility] [address] [place] [address] / [place]   Chief Complaint   Patient presents with     Hospital F/U       HPI:    Michelle Lucio is a 85 year old  (1/27/1931),prior history of TIA, hyperlipidemia, hypertension, spinal stenosis, who was recently admitted to [facility] on 12/16/2016 for a fall and left intertrochanteric hip fracture.  She was seen by Orthopedics and subsequently underwent surgery on 12/16/2016 and was discharged on 12/20/2016 to a rehab facility. The patient was actually supposed to be discharged home today; however, this morning, she was noted to have aphasia as well as facial droop on the and right-sided weakness and hence she was brought into the ER for further evaluation. admitted to the [facility] from [facility].  Hospital stay 1/1/517 through 1/23/17  Subcortical heorrhage left cerebral hemisphere, caused by HTN urgency: stopped antiplatelet medications, followed by neurology Dr. Verdugo recommend f/u MRI in 4 weeks and no antiplatelet therapy for 4 weeks. Patient required nicardapine gtt in ICU, transitioned to hydralazine and increased lisinopril. .  Admitted to this facility for  rehab, medical management and nursing care. Current issues are: On exam today patient is resting in bed, aphagia, difficulty with speech, she is able to answer yes and no questions, denies pain or discomfort, denies CP, SOB, palpitations, N/V/D or constipation.      Last 3 BPs: 138/72, 125/80, 157/89.    CODE STATUS/ADVANCE DIRECTIVES DISCUSSION:   CPR/Full code   Patient's living condition: lives with partner    ALLERGIES:Colchicine; Hydroxychloroquine; Pilocarpine; Probenecid; and Tizanidine  PAST MEDICAL HISTORY:  has a past medical history of Neuropathy (H); Sjoegren syndrome (H); Arthritis; Spondylitis (H); Hypertension; High cholesterol; Numbness and tingling; Vertigo; Spinal  stenosis; TIA (transient ischemic attack); Shortness of breath; HLD (hyperlipidemia); Renal disease; and History of blood transfusion.  PAST SURGICAL HISTORY:  has past surgical history that includes ENT surgery; hernia repair; GYN surgery; Eye surgery; and Open reduction internal fixation hip nailing (Left, 12/16/2016).  FAMILY HISTORY: family history is not on file.  SOCIAL HISTORY:  reports that she has quit smoking. She does not have any smokeless tobacco history on file. She reports that she drinks alcohol. She reports that she does not use illicit drugs.    Post Discharge Medication Reconciliation Status: discharge medications reconciled, continue medications without change.  Current Outpatient Prescriptions   Medication Sig Dispense Refill     acetaminophen (TYLENOL) 325 MG tablet Take 650 mg by mouth 3 times daily       hydrALAZINE (APRESOLINE) 100 MG TABS tablet Take 1 tablet (100 mg) by mouth 3 times daily 60 tablet      lisinopril (PRINIVIL/ZESTRIL) 40 MG tablet Take 1 tablet (40 mg) by mouth daily 30 tablet      Acetaminophen (TYLENOL PO) Take 650 mg by mouth every 6 hours as needed for mild pain or fever       senna-docusate (SENOKOT-S;PERICOLACE) 8.6-50 MG per tablet Take 1 tablet by mouth 2 times daily       TRAMADOL HCL PO Take 50 mg by mouth every 6 hours as needed for moderate to severe pain       polyethylene glycol (MIRALAX/GLYCOLAX) powder Take 17 g by mouth daily as needed        folic acid (FOLVITE) 1 MG tablet Take 1 tablet (1 mg) by mouth daily 30 tablet 0     CYANOCOBALAMIN SL Place 500 mcg under the tongue daily       polyethylene glycol 0.4%- propylene glycol 0.3% (SYSTANE ULTRA) 0.4-0.3 % SOLN ophthalmic solution Place 1 drop into both eyes every hour as needed for dry eyes       simvastatin (ZOCOR) 20 MG tablet Take 1 tablet (20 mg) by mouth At Bedtime       predniSONE (DELTASONE) 5 MG tablet Take 5 mg by mouth daily        gabapentin (NEURONTIN) 300 MG capsule Take 300 mg by mouth At  "Bedtime        allopurinol (ZYLOPRIM) 100 MG tablet Take 1 tablet (100 mg) by mouth daily       meclizine (ANTIVERT) 25 MG tablet Take 1 tablet (25 mg) by mouth every 6 hours as needed for dizziness       raloxifene (EVISTA) 60 MG tablet Take 1 tablet (60 mg) by mouth daily       calcium carbonate (OS-ABELINO 600 MG [place]. CA) 1500 (600 CA) MG tablet Take 1 tablet (600 mg) by mouth 2 times daily       Multiple Vitamins-Minerals (CENTRUM SILVER) per tablet Take 1 tablet by mouth daily       omega 3 1000 MG CAPS Take 1 g by mouth daily         ROS:  10 point ROS of systems including Constitutional, Eyes, Respiratory, Cardiovascular, Gastroenterology, Genitourinary, Integumentary, Muscularskeletal, Psychiatric were all negative except for pertinent positives noted in my HPI.    Exam:  /72 mmHg  Pulse 72  Temp(Src) 98.3  F (36.8  C)  Resp 18  Ht 5' 5\" (1.651 m)  Wt 142 lb 9.6 oz (64.683 kg)  BMI 23.73 kg/m2  SpO2 95%  GENERAL APPEARANCE:  Alert, in no distress  ENT:  Mouth and posterior oropharynx normal, moist mucous membranes, normal hearing acuity  EYES:  EOM, conjunctivae, lids, pupils and irises normal, PERRL  RESP:  respiratory effort and palpation of chest normal, lungs clear to auscultation , no respiratory distress  CV:  Palpation and auscultation of heart done , regular rate and rhythm, no murmur, rub, or gallop, no edema  ABDOMEN:  normal bowel sounds, soft, nontender, no hepatosplenomegaly or other masses  M/S:   Examination of:   left upper extremity and left lower extremity  Inspection, ROM, stability and muscle strength normal and right hemiparesis  SKIN:  Inspection of skin and subcutaneous tissue baseline  NEURO:   Cranial nerves 2-12 are normal tested and grossly at patient's baseline, aphagia    Lab/Diagnostic data:  CBC RESULTS:   Recent Labs   Lab Test  01/23/17   0710  01/22/17   0750   WBC  6.0  5.0   RBC  3.28*  3.37*   HGB  10.2*  10.4*   HCT  31.9*  32.6*   MCV  97  97   MCH  31.1  30.9 "   MCHC  32.0  31.9   RDW  16.9*  16.6*   PLT  125*  143*       Last Basic Metabolic Panel:  Recent Labs   Lab Test  01/23/17   0710  01/22/17   0750   NA  145*  147*   POTASSIUM  4.0  3.8   CHLORIDE  113*  115*   ABELINO  8.5  8.8   CO2  23  21   BUN  41*  35*   CR  1.12*  0.99   GLC  104*  120*     ASSESSMENT/PLAN:  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 Verdugo  Diet pureed with nectar thick liquids  SBP goal < 140mmhg    Physical deconditioning  Acute: PT and OT for evaluation and treatment    Benign essential hypertension  Acute: vitals daily and prn, BMP follow,continue hydralazine 100mg QID, lisinopril 40mg QD    Intertrochanteric fracture of left hip, closed, initial encounter (H)  Acute: stable, continue PT and OT for strengthening, tylenol 650mg TID scheduled and q 6 hours prn, ultram 50mg q 6 hours prn    Anemia due to blood loss, acute  Acute: follow Hgb, continue vitamin B12 500mg QD, folic acid 1mg QD     CKD (chronic kidney disease) stage 3, GFR 30-59 ml/min  Chronic: BMP follow       Orders:  BMP and Hgb in AM    Information reviewed:  Medications, vital signs, orders, nursing notes, problem list, hospital information. Total time spent with patient visit was 45 min including patient visit and review of past records. Greater than 50% of total time spent with counseling and coordinating care.    Electronically signed by:  Tonya Lynn Haase, APRN CNP

## 2017-01-24 NOTE — PLAN OF CARE
Problem: Goal Outcome Summary  Goal: Goal Outcome Summary  Speech Language Therapy Discharge Summary    Reason for therapy discharge:    Discharged to transitional care facility.    Progress towards therapy goal(s). See goals on Care Plan in [facility] electronic health record for goal details.  Goals partially met.  Barriers to achieving goals:   discharge from facility.    Therapy recommendation(s):    Continued therapy is recommended.  Rationale/Recommendations:  Continue speech therapy for swallowing to ensure diet toleance of a Dysphagia Diet level 1 with nectar thick liquids by spoon. Safe diet advancement. Had a VFSS as an IP. .

## 2017-01-24 NOTE — PLAN OF CARE
Problem: Goal Outcome Summary  Goal: Goal Outcome Summary  Physical Therapy Discharge Summary    Reason for therapy discharge:    Discharged to transitional care facility.    Progress towards therapy goal(s). See goals on Care Plan in [facility] electronic health record for goal details.  Goals not met.  Barriers to achieving goals:   discharge from facility.    Therapy recommendation(s):    Continued therapy is recommended.  Rationale/Recommendations:  Patient would benefit from continued skilled PT to progress functional independence. .

## 2017-01-27 NOTE — PROGRESS NOTES
[place] [facility]    Chief Complaint   Patient presents with     Nursing Home Acute       HPI:    Michelle Lucio is a 86 year old  (1/27/1931), who is being seen today for an episodic care visit at [facility]. Today's concern is:  1. 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 Intertrochanteric fracture of left hip, closed, with routine healing, subsequent encounter    3. Anemia due to blood loss, acute    4. Physical deconditioning    5. Benign essential hypertension    6. CKD (chronic kidney disease) stage 3, GFR 30-59 ml/min    Hemorrhage caused by HTN urgency: stopped antiplatelet medications, followed by neurology Dr. Verdugo recommend f/u MRI in 4 weeks and no antiplatelet therapy for 4 weeks. Patient required nicardapine gtt in ICU, transitioned to hydralazine and increased lisinopril.  Aphagia, difficulty with speech, able to answer yes and no questions, denies pain or discomfort, denies CP, SOB, palpitations, N/V. She continues to use EZ lift for transfers due to right sided weakness.     Last 3 BPs: 123/71, 117/62, 107/58.  Admission Weight: 151.2lbs  Current Weight: 141.8lbs    ALLERGIES: Colchicine; Hydroxychloroquine; Pilocarpine; Probenecid; and Tizanidine  Past Medical, Surgical, Family and Social History reviewed and updated in [facility].    Current Outpatient Prescriptions   Medication Sig Dispense Refill     acetaminophen (TYLENOL) 325 MG tablet Take 650 mg by mouth 3 times daily       hydrALAZINE (APRESOLINE) 100 MG TABS tablet Take 1 tablet (100 mg) by mouth 3 times daily 60 tablet      lisinopril (PRINIVIL/ZESTRIL) 40 MG tablet Take 1 tablet (40 mg) by mouth daily 30 tablet      Acetaminophen (TYLENOL PO) Take 650 mg by mouth every 6 hours as needed for mild pain or fever       senna-docusate (SENOKOT-S;PERICOLACE) 8.6-50 MG per tablet Take 1 tablet by mouth 2 times daily       TRAMADOL HCL PO Take 50 mg by mouth every 6 hours as needed for moderate to severe  pain       polyethylene glycol (MIRALAX/GLYCOLAX) powder Take 17 g by mouth daily as needed        folic acid (FOLVITE) 1 MG tablet Take 1 tablet (1 mg) by mouth daily 30 tablet 0     CYANOCOBALAMIN SL Place 500 mcg under the tongue daily       polyethylene glycol 0.4%- propylene glycol 0.3% (SYSTANE ULTRA) 0.4-0.3 % SOLN ophthalmic solution Place 1 drop into both eyes every hour as needed for dry eyes       simvastatin (ZOCOR) 20 MG tablet Take 1 tablet (20 mg) by mouth At Bedtime       predniSONE (DELTASONE) 5 MG tablet Take 5 mg by mouth daily        gabapentin (NEURONTIN) 300 MG capsule Take 300 mg by mouth At Bedtime        allopurinol (ZYLOPRIM) 100 MG tablet Take 1 tablet (100 mg) by mouth daily       meclizine (ANTIVERT) 25 MG tablet Take 1 tablet (25 mg) by mouth every 6 hours as needed for dizziness       calcium carbonate (OS-ABELINO 600 MG [place]. CA) 1500 (600 CA) MG tablet Take 1 tablet (600 mg) by mouth 2 times daily       Multiple Vitamins-Minerals (CENTRUM SILVER) per tablet Take 1 tablet by mouth daily       Medications reviewed:  Medications reconciled to facility chart and changes were made to reflect current medications as identified as above med list. Below are the changes that were made:   Medications stopped since last EPIC medication reconciliation:   Medications Discontinued During This Encounter   Medication Reason     raloxifene (EVISTA) 60 MG tablet Medication Reconciliation Clean Up     omega 3 1000 MG CAPS Medication Reconciliation Clean Up       Medications started since last [facility] medication reconciliation:  No orders of the defined types were placed in this encounter.         REVIEW OF SYSTEMS:  10 point ROS of systems including Constitutional, Eyes, Respiratory, Cardiovascular, Gastroenterology, Genitourinary, Integumentary, Muscularskeletal, Psychiatric were all negative except for pertinent positives noted in my HPI.    Physical Exam:  /66 mmHg  Pulse 88  Temp(Src) 98.7  F (37.1  " C)  Resp 18  Ht 5' 5\" (1.651 m)  Wt 141 lb 12.8 oz (64.32 kg)  BMI 23.60 kg/m2  SpO2 96%  GENERAL APPEARANCE:  Alert, in no distress, cooperative  ENT:  Mouth and posterior oropharynx normal, moist mucous membranes, [place]  EYES:  EOM normal, Conjunctiva and lids normal  NECK:  No adenopathy,masses or thyromegaly  RESP:  respiratory effort and palpation of chest normal, lungs clear to auscultation , no respiratory distress  CV:  Palpation and auscultation of heart done , regular rate and rhythm, no murmur, rub, or gallop, no edema  ABDOMEN:  normal bowel sounds, soft, nontender, no hepatosplenomegaly or other masses, no guarding or rebound, bowel sounds normal  M/S:   Examination of:   left upper extremity and left lower extremity  Inspection, ROM, stability and muscle strength normal and right hemiparesis  SKIN:  Inspection of skin and subcutaneous tissue baseline  NEURO:   right hemiparesis, right facial droop, expressive aphasia    Recent Labs:  CBC RESULTS:   Recent Labs   Lab Test  01/23/17   0710  01/22/17   0750   WBC  6.0  5.0   RBC  3.28*  3.37*   HGB  10.2*  10.4*   HCT  31.9*  32.6*   MCV  97  97   MCH  31.1  30.9   MCHC  32.0  31.9   RDW  16.9*  16.6*   PLT  125*  143*       Last Basic Metabolic Panel:  Recent Labs   Lab Test  01/23/17   0710  01/22/17   0750   NA  145*  147*   POTASSIUM  4.0  3.8   CHLORIDE  113*  115*   ABELINO  8.5  8.8   CO2  23  21   BUN  41*  35*   CR  1.12*  0.99   GLC  104*  120*     Assessment/Plan:  (I61.0) Nontraumatic subcortical hemorrhage of left cerebral hemisphere (H)  (primary encounter diagnosis)  Comment: Acute. Aphagia, right hemiparesis and dysphagia  Plan: Continue with PT, OT, ST for treatment, Blood pressure control, f/u with Dr. Verdugo in ~3 weeks  Diet pureed with nectar thick liquids. SBP goal < 140mmhg    (S72.142D) Intertrochanteric fracture of left hip, closed, with routine healing, subsequent encounter  (D62) Anemia due to blood loss, acute  (R53.81) " Physical deconditioning  Comment: Acute. Improving  Plan: Continue PT and OT for strengthening, tylenol 650mg TID scheduled and q 6 hours prn, ultram 50mg q 6 hours prn. Follow Hgb, continue vitamin B12 500mg QD, folic acid 1mg QD     (I10) Benign essential hypertension  (N18.3) CKD (chronic kidney disease) stage 3, GFR 30-59 ml/min  Comment: Chronic. Controlled  Plan: Vitals daily and prn, BMP follow, continue hydralazine 100mg QID, lisinopril 40mg QD        Electronically signed by  Chio Edwards NP

## 2017-02-01 PROBLEM — I61.0 NONTRAUMATIC SUBCORTICAL HEMORRHAGE OF LEFT CEREBRAL HEMISPHERE (H): Status: ACTIVE | Noted: 2017-01-01

## 2017-02-01 NOTE — PROGRESS NOTES
[place] [facility]    Chief Complaint   Patient presents with     Nursing Home Acute       HPI:    Michelle Lcuio is a 86 year old  (1/27/1931), who is being seen today for an episodic care visit at [facility]. Today's concern is:  1. 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 Thrombocytopenia (H)    3. Hypotension, unspecified hypotension type    Patient with poor PO intake, nectar thickened liquids. Yesterday BPs remained low. Patient sleepy, sodium levels high at 148. IV dextrose 5% in water ordered. Today's labs with normal sodium level & improved BMP: Cr 1.10 <-- 1.22, BUN 28 <-- 28, GFR 53 <-- 46  BP this AM (premeds) 161/81; postmeds 83/28, recheck with manual 78/44      Last 3 BPs: 153/81, 114/64, 124/68.  Admission Weight: 151.2lbs  Current Weight: 132.4lbs, 140.7lbs    ALLERGIES: Colchicine; Hydroxychloroquine; Pilocarpine; Probenecid; and Tizanidine  Past Medical, Surgical, Family and Social History reviewed and updated in [employer].    Current Outpatient Prescriptions   Medication Sig Dispense Refill     acetaminophen (TYLENOL) 325 MG tablet Take 650 mg by mouth 3 times daily       hydrALAZINE (APRESOLINE) 100 MG TABS tablet Take 1 tablet (100 mg) by mouth 3 times daily 60 tablet      lisinopril (PRINIVIL/ZESTRIL) 40 MG tablet Take 1 tablet (40 mg) by mouth daily 30 tablet      Acetaminophen (TYLENOL PO) Take 650 mg by mouth every 6 hours as needed for mild pain or fever       senna-docusate (SENOKOT-S;PERICOLACE) 8.6-50 MG per tablet Take 1 tablet by mouth 2 times daily       TRAMADOL HCL PO Take 50 mg by mouth every 6 hours as needed for moderate to severe pain       polyethylene glycol (MIRALAX/GLYCOLAX) powder Take 17 g by mouth daily as needed        folic acid (FOLVITE) 1 MG tablet Take 1 tablet (1 mg) by mouth daily 30 tablet 0     CYANOCOBALAMIN SL Place 500 mcg under the tongue daily       polyethylene glycol 0.4%- propylene glycol 0.3% (SYSTANE ULTRA)  "0.4-0.3 % SOLN ophthalmic solution Place 1 drop into both eyes every hour as needed for dry eyes       simvastatin (ZOCOR) 20 MG tablet Take 1 tablet (20 mg) by mouth At Bedtime       predniSONE (DELTASONE) 5 MG tablet Take 5 mg by mouth daily        gabapentin (NEURONTIN) 300 MG capsule Take 300 mg by mouth At Bedtime        allopurinol (ZYLOPRIM) 100 MG tablet Take 1 tablet (100 mg) by mouth daily       meclizine (ANTIVERT) 25 MG tablet Take 1 tablet (25 mg) by mouth every 6 hours as needed for dizziness       calcium carbonate (OS-ABELINO 600 MG [place]. CA) 1500 (600 CA) MG tablet Take 1 tablet (600 mg) by mouth 2 times daily       Multiple Vitamins-Minerals (CENTRUM SILVER) per tablet Take 1 tablet by mouth daily       Medications reviewed:  Medications reconciled to facility chart and changes were made to reflect current medications as identified as above med list. Below are the changes that were made:   Medications stopped since last EPIC medication reconciliation:   There are no discontinued medications.    Medications started since last [facility] medication reconciliation:  No orders of the defined types were placed in this encounter.         REVIEW OF SYSTEMS:  10 point ROS of systems including Constitutional, Eyes, Respiratory, Cardiovascular, Gastroenterology, Genitourinary, Integumentary, Muscularskeletal, Psychiatric were all negative except for pertinent positives noted in my HPI.    Physical Exam:  /64 mmHg  Pulse 77  Temp(Src) 97.9  F (36.6  C)  Resp 18  Ht 5' 5\" (1.651 m)  Wt 132 lb 6.4 oz (60.056 kg)  BMI 22.03 kg/m2  SpO2 91%  GENERAL APPEARANCE:  in no distress, cooperative, sleepy  ENT:  Mouth and posterior oropharynx normal, moist mucous membranes, [place]  EYES:  EOM normal, Conjunctiva and lids normal  NECK:  No adenopathy,masses or thyromegaly  RESP:  respiratory effort and palpation of chest normal, lungs clear to auscultation , no respiratory distress  CV:  Palpation and auscultation of " heart done , regular rate and rhythm, no murmur, rub, or gallop, no edema  ABDOMEN:  normal bowel sounds, soft, nontender, no hepatosplenomegaly or other masses, no guarding or rebound, bowel sounds normal  M/S:   minimal movement in RUE, weakness in RLE, 5/5 strength in left   SKIN:  Inspection of skin and subcutaneous tissue baseline  NEURO:   ight hemiparesis, right neglect, right facial droop, aphasia    Recent Labs:  CBC RESULTS:   Recent Labs   Lab Test 01/30/17 01/23/17   0710  01/22/17   0750   WBC  3.2*  6.0  5.0   RBC  3.12*  3.28*  3.37*   HGB  9.6*  10.2*  10.4*   HCT  32.2*  31.9*  32.6*   MCV  103.2*  97  97   MCH   --   31.1  30.9   MCHC   --   32.0  31.9   RDW  17.7*  16.9*  16.6*   PLT  135*  125*  143*       Last Basic Metabolic Panel:  Recent Labs   Lab Test 01/30/17 01/23/17   0710  01/22/17   0750   NA  148*  145*  147*   POTASSIUM  4.0  4.0  3.8   CHLORIDE  119*  113*  115*   ABELINO   --   8.5  8.8   CO2  24  23  21   BUN  39*  41*  35*   CR  1.22  1.12*  0.99   GLC  82  104*  120*     Assessment/Plan:  (I61.0) Nontraumatic subcortical hemorrhage of left cerebral hemisphere (H)  (primary encounter diagnosis)  Comment: Acute. Aphagia, right hemiparesis, right neglect and dysphagia  Plan:   1. Continue with PT, OT, ST for treatment  2. Blood pressure control, SBP goal < 140mmhg  3.  f/u with Dr. Verdugo in ~3 weeks  4. Diet pureed with nectar thick liquids    (D61.818) Pancytopenia (H)  Comment: Stable  Plan:   1. Continue B12 and Folate supplements  2. Follow CBC    (I95.9) Hypotension, unspecified hypotension type  Comment: Noted during hospital stay, started on hydralazine & lisinopril for HTN urgency  Plan:   1. Decrease hydralazine to 50mg tid, hold for BP <110  2. Continue with lisinopril 40mg daily for now  3. Keep IV in place, may require additional fluids        Electronically signed by  Chio Edwards NP

## 2017-02-02 NOTE — PROGRESS NOTES
[facility]  PRIMARY CARE PROVIDER AND CLINIC:  [facility] Jameson [facility] [address] [place] [address] / [place]       Pt seen for an extended hospital f/u visit  Pt known to me from TCU stay preceding hospital admission.    HPI:    Michelle Lucio is a 85 year old  (1/27/1931) who was hospitalized at [facility] 1/15/17-1/23/17 for the management of a non-traumatic L subcortical hemorrhage.  Pt has been preparing for d/c to home from this facility after rehab from L hip fracture repair, when she developed facial droop and R sided weakness.     Hospital course reviewed by me, is as per the hospital d/c summary and NP note.  During hospitalization, aphasia improved somewhat, R hemiplegia unchanged  Platelet inhibitors d/martine, anti-HTM meds were increased    History limited by aphasia      CODE STATUS/ADVANCE DIRECTIVES DISCUSSION:   CPR/Full code   Patient's living condition: lives with partner    ALLERGIES:Colchicine; Hydroxychloroquine; Pilocarpine; Probenecid; and Tizanidine  PAST MEDICAL HISTORY:  has a past medical history of Neuropathy (H); Sjoegren syndrome (H); Arthritis; Spondylitis (H); Hypertension; High cholesterol; Numbness and tingling; Vertigo; Spinal stenosis; TIA (transient ischemic attack); Shortness of breath; HLD (hyperlipidemia); Renal disease; and History of blood transfusion.  PAST SURGICAL HISTORY:  has past surgical history that includes ENT surgery; hernia repair; GYN surgery; Eye surgery; and Open reduction internal fixation hip nailing (Left, 12/16/2016).  FAMILY HISTORY: family history is not on file.  SOCIAL HISTORY:  reports that she has quit smoking. She does not have any smokeless tobacco history on file. She reports that she drinks alcohol. She reports that she does not use illicit drugs.        Post Discharge Medication Reconciliation Status:   .  Current Outpatient Prescriptions   Medication Sig Dispense Refill     acetaminophen (TYLENOL) 325 MG tablet Take 650 mg by  mouth 3 times daily       hydrALAZINE (APRESOLINE) 100 MG TABS tablet Take 1 tablet (100 mg) by mouth 3 times daily 60 tablet      lisinopril (PRINIVIL/ZESTRIL) 40 MG tablet Take 1 tablet (40 mg) by mouth daily 30 tablet      Acetaminophen (TYLENOL PO) Take 650 mg by mouth every 6 hours as needed for mild pain or fever       senna-docusate (SENOKOT-S;PERICOLACE) 8.6-50 MG per tablet Take 1 tablet by mouth 2 times daily       TRAMADOL HCL PO Take 50 mg by mouth every 6 hours as needed for moderate to severe pain       polyethylene glycol (MIRALAX/GLYCOLAX) powder Take 17 g by mouth daily as needed        folic acid (FOLVITE) 1 MG tablet Take 1 tablet (1 mg) by mouth daily 30 tablet 0     CYANOCOBALAMIN SL Place 500 mcg under the tongue daily       polyethylene glycol 0.4%- propylene glycol 0.3% (SYSTANE ULTRA) 0.4-0.3 % SOLN ophthalmic solution Place 1 drop into both eyes every hour as needed for dry eyes       simvastatin (ZOCOR) 20 MG tablet Take 1 tablet (20 mg) by mouth At Bedtime       predniSONE (DELTASONE) 5 MG tablet Take 5 mg by mouth daily        gabapentin (NEURONTIN) 300 MG capsule Take 300 mg by mouth At Bedtime        allopurinol (ZYLOPRIM) 100 MG tablet Take 1 tablet (100 mg) by mouth daily       meclizine (ANTIVERT) 25 MG tablet Take 1 tablet (25 mg) by mouth every 6 hours as needed for dizziness       calcium carbonate (OS-ABELINO 600 MG [place]. [place]) 1500 (600 [place]) MG tablet Take 1 tablet (600 mg) by mouth 2 times daily       Multiple Vitamins-Minerals (CENTRUM SILVER) per tablet Take 1 tablet by mouth daily         ROS:  Unable to obtain secondary to aphasia    Exam:    GENERAL APPEARANCE:  Sleepy, lying in bed  ENT:  Oral mucosa moist  EYES:  EOM, conjunctivae, lids, pupils and irises normal, PERRL  RESP:  Lungs clear  CV:  RRR no M  ABDOMEN: soft, non-distended  No LE edema  L hip incision was not examined  NEURO:   Sleepy, opens eyes to name. Sl R facial droop.  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 [place]oy thick liquids      Benign essential hypertension, accelerated in the setting of acute CVA  BP stable since admission on hydralazine and lisinopril  Plan monitor BP, goal systolic less than 140, monitor BMP      Intertrochanteric fracture of left hip, closed, initial encounter (H)   stable,   Plan continue PT and OT for strengthening    Anemia due to blood loss, acute  Acute:   Plan monitor Hgb continue vitamin B12 500mg QD, folic acid 1mg QD       Art Aburto MD  "

## 2017-02-03 PROBLEM — R63.4 LOSS OF WEIGHT: Status: ACTIVE | Noted: 2017-01-01

## 2017-02-03 NOTE — PROGRESS NOTES
[facility]    Chief Complaint   Patient presents with     Abdominal Pain     Nursing Home Acute       HPI:    Michelle Lucio is a 86 year old  (1/27/1931), who is being seen today for an episodic care visit at [facility]. Today's concern is:  1. Benign essential hypertension    2. Loss of weight    3. Constipation, unspecified constipation type    4. Advanced directives, counseling/discussion    Staff reported change in patient condition: patient having generalized discomfort/pain & continues to look up at ceiling while groaning.   When asked to point to the location of her pain, patient pointed to left hip. Upon light palpation to abdomen patient jumped in pain. Suppository given yesterday. Nurse reports large BM charted last night. Bladder scan revealed 0 for residual urine. Patient now on toilet having BM    Poor PO intake continues, nectar thickened liquids. BPs following med administration have improved. Morning BP prior to meds 170/85 but recheck after meds 11/65 & 93/55. Sodium level recehcked yesterday & remains normal following 1L IV dextrose 5%. BMP holding stable: Cr 1.16 <-- 1.10 <-- 1.22, BUN 27 <-- 28 <-- 28, GFR 49 <-- 53 <-- 46       ALLERGIES: Colchicine; Hydroxychloroquine; Pilocarpine; Probenecid; and Tizanidine  Past Medical, Surgical, Family and Social History reviewed and updated in [facility].    Current Outpatient Prescriptions   Medication Sig Dispense Refill     acetaminophen (TYLENOL) 325 MG tablet Take 650 mg by mouth 3 times daily       hydrALAZINE (APRESOLINE) 100 MG TABS tablet Take 1 tablet (100 mg) by mouth 3 times daily 60 tablet      lisinopril (PRINIVIL/ZESTRIL) 40 MG tablet Take 1 tablet (40 mg) by mouth daily 30 tablet      Acetaminophen (TYLENOL PO) Take 650 mg by mouth every 6 hours as needed for mild pain or fever       senna-docusate (SENOKOT-S;PERICOLACE) 8.6-50 MG per tablet Take 1 tablet by mouth 2 times daily       TRAMADOL HCL PO Take 50 mg by mouth every 6  "hours as needed for moderate to severe pain       polyethylene glycol (MIRALAX/GLYCOLAX) powder Take 17 g by mouth daily as needed        folic acid (FOLVITE) 1 MG tablet Take 1 tablet (1 mg) by mouth daily 30 tablet 0     CYANOCOBALAMIN SL Place 500 mcg under the tongue daily       polyethylene glycol 0.4%- propylene glycol 0.3% (SYSTANE ULTRA) 0.4-0.3 % SOLN ophthalmic solution Place 1 drop into both eyes every hour as needed for dry eyes       simvastatin (ZOCOR) 20 MG tablet Take 1 tablet (20 mg) by mouth At Bedtime       predniSONE (DELTASONE) 5 MG tablet Take 5 mg by mouth daily        gabapentin (NEURONTIN) 300 MG capsule Take 300 mg by mouth At Bedtime        allopurinol (ZYLOPRIM) 100 MG tablet Take 1 tablet (100 mg) by mouth daily       meclizine (ANTIVERT) 25 MG tablet Take 1 tablet (25 mg) by mouth every 6 hours as needed for dizziness       calcium carbonate (OS-ABELINO 600 MG [place]. [place]) 1500 (600 [place]) MG tablet Take 1 tablet (600 mg) by mouth 2 times daily       Multiple Vitamins-Minerals (CENTRUM SILVER) per tablet Take 1 tablet by mouth daily       Medications reviewed:  Medications reconciled to facility chart and changes were made to reflect current medications as identified as above med list. Below are the changes that were made:   Medications stopped since last EPIC medication reconciliation:   There are no discontinued medications.    Medications started since last [facility] medication reconciliation:  No orders of the defined types were placed in this encounter.         REVIEW OF SYSTEMS:  Unobtainable secondary to cognitive impairment or aphasia.    Physical Exam:  BP 93/55 mmHg  Pulse 74  Temp(Src) 97.2  F (36.2  C)  Resp 18  Ht 5' 5\" (1.651 m)  Wt 132 lb 1.6 oz (59.92 kg)  BMI 21.98 kg/m2  SpO2 94%  GENERAL APPEARANCE:  Alert, in mod distress due to abdominal pain, cooperative  ENT:  Mouth and posterior oropharynx normal, moist mucous membranes, [place]  EYES:  EOM normal, Conjunctiva and lids " normal  NECK:  No adenopathy,masses or thyromegaly  RESP:  respiratory effort and palpation of chest normal, lungs clear to auscultation , no respiratory distress  CV:  Palpation and auscultation of heart done , regular rate and rhythm, no murmur, rub, or gallop, no edema  ABDOMEN:  normal bowel sounds, soft, nontender, no hepatosplenomegaly or other masses, no guarding or rebound, bowel sounds normal  SKIN:  Inspection of skin and subcutaneous tissue baseline  NEURO:   right hemiparesis, right neglect, right facial droop, aphasia    Recent Labs:    WBC      3.2   1/30/2017  RBC     3.12   1/30/2017  HGB      9.6   1/30/2017  HCT     32.2   1/30/2017  MCV    103.2   1/30/2017  MCH     31.1   1/23/2017  MCHC     32.0   1/23/2017  RDW     17.7   1/30/2017  PLT      135   1/30/2017  Last Basic Metabolic Panel:  NA      148   1/30/2017   POTASSIUM      4.0   1/30/2017  CHLORIDE      119   1/30/2017  ABELINO      8.5   1/27/2017  CO2       24   1/30/2017  BUN       39   1/30/2017  CR     1.22   1/30/2017  GLC       82   1/30/2017      Assessment/Plan:  (I10) Benign essential hypertension  (primary encounter diagnosis)  Comment: Hypotension noted during hospital stay, started on hydralazine & lisinopril for HTN urgency. Some improvement with lowered hydralazine dose  Plan:   1. Continue with hydralazine 25 mg tid, hold for BP <100  2. Continue with lisinopril 40mg daily for now  3. Follow BMP 1-2x/wk    (R63.4) Loss of weight  Comment: 6lbs in last 6 months  Plan:   1. Continue with Magic Cup q 3 hours & Med Pass 2.0 daily  2. Goal fluid intake 1,500cc/day  3. Assistance with eating    (K59.00) Constipation, unspecified constipation type  Comment: Acute. Suppository yesterday  Plan:   1. Senna-s 2 tab bid  2. Miralax daily, hold for loose stools  3. Encourage adequate fluid intake    (Z71.89) Advanced directives, counseling/discussion  Comment: Spoke with spouse  Plan:   1. Change to ok for CPR but no intubation   2. Spouse  would like to have conversation with wife regarding DNR status      Electronically signed by  Chio Edwards NP

## 2017-02-06 NOTE — PROGRESS NOTES
[facility]    Chief Complaint   Patient presents with     Nursing Home Acute       HPI:    Michelle Lucio is a 86 year old  (1/27/1931), who is being seen today for an episodic care visit at [facility]. Today's concern is:  1. Benign essential hypertension    2. CKD (chronic kidney disease) stage 3, GFR 30-59 ml/min    3. Constipation, unspecified constipation type    BPs stable since change in antihypertensive meds. Last 3 BPs: 133/81, 152/81, 114/60.  Large BM on Friday following episode of abdominal pain. Per chart review- large BM Saturday & none yesterday or today. Patient complains of some abdominal tenderness. Last BMP stable with Cr 1.16, GFR 49  Patient started on gabapentin for neuropathy on unknown date. Patient record goes back to Oct 2016 & does not participate in any known Care Everywhere organizations. Spouse would like gabapentin d/c'd.     Admission Weight: 151.2lbs  Current Weight: 131.4lbs, 131.8lbs, 132.1lbs, 136.1lbs    ALLERGIES: Colchicine; Hydroxychloroquine; Pilocarpine; Probenecid; and Tizanidine  Past Medical, Surgical, Family and Social History reviewed and updated in "[employer]".    Current Outpatient Prescriptions   Medication Sig Dispense Refill     bisacodyl (DULCOLAX) 10 MG Suppository Place 10 mg rectally every 7 days Every wednesday       hydrALAZINE (APRESOLINE) 50 MG tablet Take 50 mg by mouth 3 times daily       polyethylene glycol (MIRALAX/GLYCOLAX) powder Take 17 g by mouth daily       acetaminophen (TYLENOL) 325 MG tablet Take 650 mg by mouth 3 times daily       lisinopril (PRINIVIL/ZESTRIL) 40 MG tablet Take 1 tablet (40 mg) by mouth daily 30 tablet      Acetaminophen (TYLENOL PO) Take 650 mg by mouth every 6 hours as needed for mild pain or fever       senna-docusate (SENOKOT-S;PERICOLACE) 8.6-50 MG per tablet Take 1 tablet by mouth 2 times daily       TRAMADOL HCL PO Take 50 mg by mouth every 6 hours as needed for moderate to severe pain       polyethylene  glycol (MIRALAX/GLYCOLAX) powder Take 17 g by mouth daily as needed        folic acid (FOLVITE) 1 MG tablet Take 1 tablet (1 mg) by mouth daily 30 tablet 0     CYANOCOBALAMIN SL Place 500 mcg under the tongue daily       polyethylene glycol 0.4%- propylene glycol 0.3% (SYSTANE ULTRA) 0.4-0.3 % SOLN ophthalmic solution Place 1 drop into both eyes every hour as needed for dry eyes       simvastatin (ZOCOR) 20 MG tablet Take 1 tablet (20 mg) by mouth At Bedtime       predniSONE (DELTASONE) 5 MG tablet Take 5 mg by mouth daily        allopurinol (ZYLOPRIM) 100 MG tablet Take 1 tablet (100 mg) by mouth daily       meclizine (ANTIVERT) 25 MG tablet Take 1 tablet (25 mg) by mouth every 6 hours as needed for dizziness       calcium carbonate (OS-ABELINO 600 MG [place]. [place]) 1500 (600 [place]) MG tablet Take 1 tablet (600 mg) by mouth 2 times daily       Multiple Vitamins-Minerals (CENTRUM SILVER) per tablet Take 1 tablet by mouth daily       Medications reviewed:  Medications reconciled to facility chart and changes were made to reflect current medications as identified as above med list. Below are the changes that were made:   Medications stopped since last EPIC medication reconciliation:   Medications Discontinued During This Encounter   Medication Reason     hydrALAZINE (APRESOLINE) 100 MG TABS tablet Dose adjustment     gabapentin (NEURONTIN) 300 MG capsule Medication Reconciliation Clean Up       Medications started since last [facility] medication reconciliation:  Orders Placed This Encounter   Medications     bisacodyl (DULCOLAX) 10 MG Suppository     Sig: Place 10 mg rectally every 7 days Every wednesday     hydrALAZINE (APRESOLINE) 50 MG tablet     Sig: Take 50 mg by mouth 3 times daily     polyethylene glycol (MIRALAX/GLYCOLAX) powder     Sig: Take 17 g by mouth daily         REVIEW OF SYSTEMS:  10 point ROS of systems including Constitutional, Eyes, Respiratory, Cardiovascular, Gastroenterology, Genitourinary, Integumentary,  "Muscularskeletal, Psychiatric were all negative except for pertinent positives noted in my HPI.    Physical Exam:  /84 mmHg  Pulse 70  Temp(Src) 98.7  F (37.1  C)  Resp 18  Ht 5' 5\" (1.651 m)  Wt 131 lb 6.4 oz (59.603 kg)  BMI 21.87 kg/m2  SpO2 92%  GENERAL APPEARANCE:  Alert, in no distress, cooperative, aphasic, answers yes or no  ENT:  Mouth and posterior oropharynx normal, moist mucous membranes, normal hearing acuity  EYES:  EOM normal, Conjunctiva and lids normal  NECK:  No adenopathy,masses or thyromegaly  RESP:  respiratory effort and palpation of chest normal, lungs clear to auscultation , no respiratory distress  CV:  Palpation and auscultation of heart done , regular rate and rhythm, no murmur, rub, or gallop, no edema  ABDOMEN:  normal bowel sounds, soft, nontender, no hepatosplenomegaly or other masses, no guarding or rebound, bowel sounds normal  M/S:   right hemiparesis, 5/5 strength to left  SKIN:  Inspection of skin and subcutaneous tissue baseline  NEURO:   right hemiparesis, right neglect, right facial droop, aphasia    Recent Labs:  CBC RESULTS:   Recent Labs   Lab Test 01/30/17 01/27/17 01/23/17   0710  01/22/17   0750   WBC  3.2*   --   6.0  5.0   RBC  3.12*   --   3.28*  3.37*   HGB  9.6*  8.9*  10.2*  10.4*   HCT  32.2*   --   31.9*  32.6*   MCV  103.2*   --   97  97   MCH   --    --   31.1  30.9   MCHC   --    --   32.0  31.9   RDW  17.7*   --   16.9*  16.6*   PLT  135*   --   125*  143*       Last Basic Metabolic Panel:  Recent Labs   Lab Test 01/30/17 01/27/17 01/23/17   0710   NA  148*  147*  145*   POTASSIUM  4.0  3.9  4.0   CHLORIDE  119*  114*  113*   ABELINO   --   8.5  8.5   CO2  24  24  23   BUN  39*  48*  41*   CR  1.22  1.37*  1.12*   GLC  82  114*  104*       Assessment/Plan:  (I10) Benign essential hypertension  (primary encounter diagnosis)  (N18.3) CKD (chronic kidney disease) stage 3, GFR 30-59 ml/min  Comment: Hypotension noted during hospital stay, started on " hydralazine & lisinopril for HTN urgency. Improvement with lowered hydralazine dose  Plan:   1. Continue with hydralazine 25 mg tid, hold for BP <100  2. Continue with lisinopril 40mg daily  3. Follow BMP 1-2x/wk    (K59.00) Constipation, unspecified constipation type  Comment: Acute. Large BM x2 over weekend  Plan:   1. Senna-s 2 tab bid  2. Miralax daily, hold for loose stools  3. Encourage adequate fluid intake    Order: D/c gabapentin  Comment: patient has no current symptoms of neuropathy.     Electronically signed by  Chio Edwards NP

## 2017-02-09 PROBLEM — Z71.89 ACP (ADVANCE CARE PLANNING): Chronic | Status: ACTIVE | Noted: 2017-01-01

## 2017-02-13 NOTE — PROGRESS NOTES
[facility]    Chief Complaint   Patient presents with     Nursing Home Acute       HPI:    Michelle Lucio is a 86 year old  (1/27/1931), who is being seen today for an episodic care visit at [facility].     Today's concern is:  1. 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 Muscle rigidity    3. Benign essential hypertension    4. CKD (chronic kidney disease) stage 3, GFR 30-59 ml/min    5. Anemia, unspecified type    Hydralazine decreased to 25mg bid 3 days ago. SBP 150s-160s prior to meds, dropping to 70s/40s after meds. Patient asymptomatic. Goal SBP <140 d/t hemorrhagic stroke history.   Last Hgb stable at 9.4  Physical therapy & occupational therapy report noticing increased muscle rigidity to RUE (affected side) & requesting baclofen.     Last 3 BPs: 161/89, 156/86, 159/83.  Admission Weight: 151.2lbs  Current Weight: 129.3lbs, 129.2lbs, 132.2lbs, 130.5lbs, 131.2lbs, 131.4lbs    ALLERGIES: Colchicine; Hydroxychloroquine; Pilocarpine; Probenecid; and Tizanidine  Past Medical, Surgical, Family and Social History reviewed and updated in [facility].    Current Outpatient Prescriptions   Medication Sig Dispense Refill     Pollen Extracts (PROSTAT PO) Take 30 mLs by mouth daily       hydrALAZINE (APRESOLINE) 25 MG tablet Take 25 mg by mouth 2 times daily       acetaminophen (TYLENOL) 500 MG tablet Take 1,000 mg by mouth 3 times daily       polyethylene glycol 0.4%- propylene glycol 0.3% (SYSTANE ULTRA) 0.4-0.3 % SOLN ophthalmic solution Place 1 drop into both eyes 4 times daily as needed for dry eyes       polyethylene glycol 0.4%- propylene glycol 0.3% (SYSTANE ULTRA) 0.4-0.3 % SOLN ophthalmic solution Place 1 drop into both eyes as needed for dry eyes       LISINOPRIL PO Take 40 mg by mouth daily Hold for SBP<100       bisacodyl (DULCOLAX) 10 MG Suppository Place 10 mg rectally every 7 days Every wednesday       senna-docusate (SENOKOT-S;PERICOLACE) 8.6-50 MG per tablet  Take 1 tablet by mouth 2 times daily       TRAMADOL HCL PO Take 50 mg by mouth every 6 hours as needed for moderate to severe pain       polyethylene glycol (MIRALAX/GLYCOLAX) powder Take 17 g by mouth daily as needed        folic acid (FOLVITE) 1 MG tablet Take 1 tablet (1 mg) by mouth daily 30 tablet 0     CYANOCOBALAMIN SL Place 500 mcg under the tongue daily       simvastatin (ZOCOR) 20 MG tablet Take 1 tablet (20 mg) by mouth At Bedtime       predniSONE (DELTASONE) 5 MG tablet Take 5 mg by mouth daily        allopurinol (ZYLOPRIM) 100 MG tablet Take 1 tablet (100 mg) by mouth daily       calcium carbonate (OS-ABELINO 600 MG [place]. CA) 1500 (600 CA) MG tablet Take 1 tablet (600 mg) by mouth 2 times daily       [DISCONTINUED] lisinopril (PRINIVIL/ZESTRIL) 40 MG tablet Take 1 tablet (40 mg) by mouth daily 30 tablet      Multiple Vitamins-Minerals (CENTRUM SILVER) per tablet Take 1 tablet by mouth daily Reported on 2/13/2017       Medications reviewed:  Medications reconciled to facility chart and changes were made to reflect current medications as identified as above med list. Below are the changes that were made:   Medications stopped since last EPIC medication reconciliation:   Medications Discontinued During This Encounter   Medication Reason     hydrALAZINE (APRESOLINE) 50 MG tablet Dose adjustment     acetaminophen (TYLENOL) 325 MG tablet Dose adjustment     polyethylene glycol 0.4%- propylene glycol 0.3% (SYSTANE ULTRA) 0.4-0.3 % SOLN ophthalmic solution Dose adjustment     polyethylene glycol (MIRALAX/GLYCOLAX) powder Dose adjustment     Acetaminophen (TYLENOL PO) Medication Reconciliation Clean Up       Medications started since last [facility] medication reconciliation:  Orders Placed This Encounter   Medications     Pollen Extracts (PROSTAT PO)     Sig: Take 30 mLs by mouth daily     hydrALAZINE (APRESOLINE) 25 MG tablet     Sig: Take 25 mg by mouth 2 times daily     acetaminophen (TYLENOL) 500 MG tablet     Sig:  "Take 1,000 mg by mouth 3 times daily     polyethylene glycol 0.4%- propylene glycol 0.3% (SYSTANE ULTRA) 0.4-0.3 % SOLN ophthalmic solution     Sig: Place 1 drop into both eyes 4 times daily as needed for dry eyes     polyethylene glycol 0.4%- propylene glycol 0.3% (SYSTANE ULTRA) 0.4-0.3 % SOLN ophthalmic solution     Sig: Place 1 drop into both eyes as needed for dry eyes         REVIEW OF SYSTEMS:  10 point ROS of systems including Constitutional, Eyes, Respiratory, Cardiovascular, Gastroenterology, Genitourinary, Integumentary, Muscularskeletal, Psychiatric were all negative except for pertinent positives noted in my HPI.    Physical Exam:  /79  Pulse 78  Temp (!) 64.4  F (18  C)  Resp 18  Ht 5' 5\" (1.651 m)  Wt 129 lb 4.8 oz (58.7 kg)  SpO2 95%  BMI 21.52 kg/m2  GENERAL APPEARANCE:  Alert, in no distress, cooperative, aphasia, answers yes or no  ENT:  Mouth and posterior oropharynx normal, moist mucous membranes, normal hearing acuity  EYES:  EOM normal, Conjunctiva and lids normal  NECK:  No adenopathy,masses or thyromegaly  RESP:  respiratory effort and palpation of chest normal, lungs clear to auscultation , no respiratory distress  CV:  Palpation and auscultation of heart done , regular rate and rhythm, no murmur, rub, or gallop, no edema  ABDOMEN:  normal bowel sounds, soft, nontender, no hepatosplenomegaly or other masses, no guarding or rebound  M/S:    right hemiparesis, left 5/5 strength  SKIN:  Inspection of skin and subcutaneous tissue baseline  NEURO:    right hemiparesis, right neglect, right facial droop, aphasia    Recent Labs:  CBC RESULTS:   Recent Labs   Lab Test 01/30/17 01/27/17 01/23/17   0710  01/22/17   0750   WBC  3.2*   --   6.0  5.0   RBC  3.12*   --   3.28*  3.37*   HGB  9.6*  8.9*  10.2*  10.4*   HCT  32.2*   --   31.9*  32.6*   MCV  103.2*   --   97  97   MCH   --    --   31.1  30.9   MCHC   --    --   32.0  31.9   RDW  17.7*   --   16.9*  16.6*   PLT  135*   --   125*  143* "       Last Basic Metabolic Panel:  Recent Labs   Lab Test 02/02/17 02/01/17   NA  143  140   POTASSIUM  3.7  4.5   CHLORIDE  111*  110*   ABELINO  8.9  8.8   CO2  24  22   BUN  27*  28*   CR  1.16*  1.10*   GLC  97  104*     Assessment/Plan:  (I61.0) 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 Ongoing R-sided weakness  Plan:   1. Start baclofen 5mg tid  2. Monitor for progression of rigidity  3. May consider botox pending progression    (I10) Benign essential hypertension  (N18.3) CKD (chronic kidney disease) stage 3, GFR 30-59 ml/min  Comment: Chronic. SBP goal <140  Plan:   1. Discontinue hydralazine  2. Continue with lisinopril 40mg daily  3. Monitor BP prior to meds & 1 hour following  4. May add additional agent (beta blocker) as needed to remain below goal    (D64.9) Anemia, unspecified type  Comment: Chronic. Last stable at 9.4  Plan:   1. Follow hemoglobin  2. Transfuse for hemoglobin <7  3. Monitor for inc fatigue, weakness, pale skin, and SOB.          Electronically signed by  Chio Edwards NP

## 2017-02-15 NOTE — PROGRESS NOTES
[facility]    Chief Complaint   Patient presents with     Nursing Home Acute       HPI:    Michelle Lucio is a 86 year old  (1/27/1931), who is being seen today for an episodic care visit at [facility]. Today's concern is:  1. Hemiparesis affecting right side as late effect of stroke (H)    2. Physical deconditioning    3. Benign essential hypertension    4. CKD (chronic kidney disease) stage 3, GFR 30-59 ml/min    Patient answers yes/no appropriately. Denies pain today. Denies SOB, dizziness, or CP. BPs continue to drop following administration of lisinopril. Hydralazine was discontinued yesterday.   Last 3 BPs: 110/58, 168/92, 137/74, 110/71, 150/80. SBPs drop to 80s-100s following antihypertensive medication. Asymptomatic.   HR 70s-80s.   Admission Weight: 141.8lbs  Current Weight: 128.6lbs, 129.3lbs, 128.8lbs, 129.3lbs, 132.2lbs    ALLERGIES: Colchicine; Hydroxychloroquine; Pilocarpine; Probenecid; and Tizanidine  Past Medical, Surgical, Family and Social History reviewed and updated in [facility].    Current Outpatient Prescriptions   Medication Sig Dispense Refill     baclofen (LIORESAL) 5 MG TABS half-tab Take 5 mg by mouth 3 times daily       Pollen Extracts (PROSTAT PO) Take 30 mLs by mouth daily       acetaminophen (TYLENOL) 500 MG tablet Take 1,000 mg by mouth 3 times daily       polyethylene glycol 0.4%- propylene glycol 0.3% (SYSTANE ULTRA) 0.4-0.3 % SOLN ophthalmic solution Place 1 drop into both eyes 4 times daily as needed for dry eyes       polyethylene glycol 0.4%- propylene glycol 0.3% (SYSTANE ULTRA) 0.4-0.3 % SOLN ophthalmic solution Place 1 drop into both eyes as needed for dry eyes       LISINOPRIL PO Take 40 mg by mouth daily Hold for SBP<100       bisacodyl (DULCOLAX) 10 MG Suppository Place 10 mg rectally every 7 days Every wednesday       senna-docusate (SENOKOT-S;PERICOLACE) 8.6-50 MG per tablet Take 1 tablet by mouth 2 times daily       TRAMADOL HCL PO Take 50 mg by mouth  "every 6 hours as needed for moderate to severe pain       polyethylene glycol (MIRALAX/GLYCOLAX) powder Take 17 g by mouth daily as needed        folic acid (FOLVITE) 1 MG tablet Take 1 tablet (1 mg) by mouth daily 30 tablet 0     CYANOCOBALAMIN SL Place 500 mcg under the tongue daily       simvastatin (ZOCOR) 20 MG tablet Take 1 tablet (20 mg) by mouth At Bedtime       predniSONE (DELTASONE) 5 MG tablet Take 5 mg by mouth daily        allopurinol (ZYLOPRIM) 100 MG tablet Take 1 tablet (100 mg) by mouth daily       calcium carbonate (OS-ABELINO 600 MG [place]. CA) 1500 (600 CA) MG tablet Take 1 tablet (600 mg) by mouth 2 times daily       Multiple Vitamins-Minerals (CENTRUM SILVER) per tablet Take 1 tablet by mouth daily Reported on 2/15/2017       Medications reviewed:  Medications reconciled to facility chart and changes were made to reflect current medications as identified as above med list. Below are the changes that were made:   Medications stopped since last EPIC medication reconciliation:   Medications Discontinued During This Encounter   Medication Reason     hydrALAZINE (APRESOLINE) 25 MG tablet Medication Reconciliation Clean Up       Medications started since last [facility] medication reconciliation:  Orders Placed This Encounter   Medications     baclofen (LIORESAL) 5 MG TABS half-tab     Sig: Take 5 mg by mouth 3 times daily         REVIEW OF SYSTEMS:  10 point ROS of systems including Constitutional, Eyes, Respiratory, Cardiovascular, Gastroenterology, Genitourinary, Integumentary, Muscularskeletal, Psychiatric were all negative except for pertinent positives noted in my HPI.    Physical Exam:  BP (!) 168/92  Pulse 78  Temp (!) 64.4  F (18  C)  Resp 18  Ht 5' 5\" (1.651 m)  Wt 128 lb 9.6 oz (58.3 kg)  SpO2 97%  BMI 21.4 kg/m2  GENERAL APPEARANCE:  Alert, in no distress, cooperative  ENT:  Mouth and posterior oropharynx normal, moist mucous membranes, [place]  EYES:  EOM normal, Conjunctiva and lids normal  NECK:  No " adenopathy,masses or thyromegaly  RESP:  respiratory effort and palpation of chest normal, lungs clear to auscultation , no respiratory distress  CV:  Palpation and auscultation of heart done , regular rate and rhythm, no murmur, rub, or gallop, no edema  ABDOMEN:  normal bowel sounds, soft, nontender, no hepatosplenomegaly or other masses, no guarding or rebound  M/S:    right hemiparesis, left 5/5 strength  SKIN:  Inspection of skin and subcutaneous tissue baseline  NEURO:   right hemiparesis, right neglect, right facial droop, aphasia- as before    Recent Labs:  CBC RESULTS:   Recent Labs   Lab Test 01/30/17 01/27/17 01/23/17   0710  01/22/17   0750   WBC  3.2*   --   6.0  5.0   RBC  3.12*   --   3.28*  3.37*   HGB  9.6*  8.9*  10.2*  10.4*   HCT  32.2*   --   31.9*  32.6*   MCV  103.2*   --   97  97   MCH   --    --   31.1  30.9   MCHC   --    --   32.0  31.9   RDW  17.7*   --   16.9*  16.6*   PLT  135*   --   125*  143*       Last Basic Metabolic Panel:  Recent Labs   Lab Test 02/02/17 02/01/17   NA  143  140   POTASSIUM  3.7  4.5   CHLORIDE  111*  110*   ABELINO  8.9  8.8   CO2  24  22   BUN  27*  28*   CR  1.16*  1.10*   GLC  97  104*     Assessment/Plan:  (I69.351) Hemiparesis affecting right side as late effect of stroke (H)  (primary encounter diagnosis)  (R53.81) Physical deconditioning  Comment: Acute. Ongoing   Plan:   1. Continue with physical therapy & occupational therapy for strength training, endurance  2. Monitor for falls    (I10) Benign essential hypertension  (N18.3) CKD (chronic kidney disease) stage 3, GFR 30-59 ml/min  Comment: Chronic. SBP goal <140  Plan:   1. Discontinue hydralazine  2. Decrease lisinopril to 20mg daily  3. Monitor BP prior to meds & 1 hour following  4. May add additional agent (beta blocker) as needed to remain below goal  5. Follow BMP      Electronically signed by  Chio Edwards NP

## 2017-02-16 NOTE — TELEPHONE ENCOUNTER
Patient continues to experience low BPs following administration of lisinopril 20mg daily (dose decreased yesterday, hydralazine d/c'd the day before). Premed SBP today in 130s, yesterday 160s. Goal SBP <140 d/t recent hemorrhagic stroke.   Patient started on baclofen 5mg tid for increased muscle rigidity on affected side. physical therapy is having difficulty working with patient as she is now very sleepy.    Orders:  1. Further decrease lisinopril to 10mg daily  2. Continue to monitor BP prior to lisinopril & 1 hour following  3. Decrease baclofen to 2.5mg tid, contact NP if further dose reduction necessary     MONSE Stark  2/16/17

## 2017-02-20 NOTE — PROGRESS NOTES
[facility]    Chief Complaint   Patient presents with     Respiratory Problems       HPI:    Michelle Lucio is a 86 year old  (1931), who is being seen today for an episodic care [facility]. Today's concern is:  Nontraumatic intracerebral hemorrhage of cerebellum, unspecified laterality (H)  Hypoxia  Restlessness and agitation  Nurse called to report patient has had a significant change in status over the weekend.  She had not been eating or drinking, she was restless and moaning.  I spoke with her wife who essentially requested comfort focused care.  During her admission to this facility Michelle was made full code, however today when her situation became more grave, she decided DNR/DNI.  When I spoke to Caprice, I informed her we would start Dilaudid to make her more comfortable, and she agreed.  Before the Nurse was able to administer the medication, she passed away.  Nursing staff are notifying the ME due to Left hip fx on 16.  SW will assist wife to find a  home.   in with her.      REVIEW OF SYSTEMS:  NA - patient     There were no vitals taken for this visit.  GENERAL APPEARANCE:  No respirations or heart beat    ASSESSMENT/PLAN:     Nontraumatic intracerebral hemorrhage of cerebellum, unspecified laterality (H)  Hypoxia  Restlessness and agitation   OK to release body to mortuary when family ready.  Notified Dr. Art Aburto of her passing.    SHERIF Amaro CNP
